# Patient Record
Sex: FEMALE | Race: ASIAN | NOT HISPANIC OR LATINO | Employment: OTHER | URBAN - METROPOLITAN AREA
[De-identification: names, ages, dates, MRNs, and addresses within clinical notes are randomized per-mention and may not be internally consistent; named-entity substitution may affect disease eponyms.]

---

## 2017-01-10 ENCOUNTER — OFFICE VISIT (OUTPATIENT)
Dept: CARDIOLOGY | Facility: CLINIC | Age: 67
End: 2017-01-10
Attending: INTERNAL MEDICINE
Payer: MEDICARE

## 2017-01-10 VITALS
WEIGHT: 138.5 LBS | BODY MASS INDEX: 23.64 KG/M2 | HEIGHT: 64 IN | HEART RATE: 70 BPM | SYSTOLIC BLOOD PRESSURE: 132 MMHG | DIASTOLIC BLOOD PRESSURE: 76 MMHG

## 2017-01-10 DIAGNOSIS — E78.00 PURE HYPERCHOLESTEROLEMIA: ICD-10-CM

## 2017-01-10 DIAGNOSIS — I25.10 CORONARY ARTERY DISEASE INVOLVING NATIVE CORONARY ARTERY OF NATIVE HEART WITHOUT ANGINA PECTORIS: ICD-10-CM

## 2017-01-10 DIAGNOSIS — I25.10 CORONARY ARTERY DISEASE INVOLVING NATIVE CORONARY ARTERY OF NATIVE HEART WITHOUT ANGINA PECTORIS: Primary | ICD-10-CM

## 2017-01-10 LAB
ALT SERPL W P-5'-P-CCNC: <5 U/L (ref 5–30)
ANION GAP SERPL CALCULATED.3IONS-SCNC: 9.1 MMOL/L (ref 6–17)
BUN SERPL-MCNC: 12 MG/DL (ref 7–30)
CALCIUM SERPL-MCNC: 8.8 MG/DL (ref 8.5–10.5)
CHLORIDE SERPL-SCNC: 106 MMOL/L (ref 98–107)
CHOLEST SERPL-MCNC: 220 MG/DL
CO2 SERPL-SCNC: 30 MMOL/L (ref 23–29)
CREAT SERPL-MCNC: 0.79 MG/DL (ref 0.7–1.3)
GFR SERPL CREATININE-BSD FRML MDRD: 73 ML/MIN/1.7M2
GLUCOSE SERPL-MCNC: 115 MG/DL (ref 70–105)
HDLC SERPL-MCNC: 61 MG/DL
LDLC SERPL CALC-MCNC: 143 MG/DL
NONHDLC SERPL-MCNC: 159 MG/DL
POTASSIUM SERPL-SCNC: 4.1 MMOL/L (ref 3.5–5.1)
SODIUM SERPL-SCNC: 141 MMOL/L (ref 136–145)
TRIGL SERPL-MCNC: 80 MG/DL

## 2017-01-10 PROCEDURE — 84460 ALANINE AMINO (ALT) (SGPT): CPT | Performed by: INTERNAL MEDICINE

## 2017-01-10 PROCEDURE — 80061 LIPID PANEL: CPT | Performed by: INTERNAL MEDICINE

## 2017-01-10 PROCEDURE — 99213 OFFICE O/P EST LOW 20 MIN: CPT | Performed by: INTERNAL MEDICINE

## 2017-01-10 PROCEDURE — 80048 BASIC METABOLIC PNL TOTAL CA: CPT | Performed by: INTERNAL MEDICINE

## 2017-01-10 PROCEDURE — 36415 COLL VENOUS BLD VENIPUNCTURE: CPT | Performed by: INTERNAL MEDICINE

## 2017-01-10 RX ORDER — NITROGLYCERIN 0.4 MG/1
0.4 TABLET SUBLINGUAL EVERY 5 MIN PRN
Qty: 25 TABLET | Refills: 0 | Status: SHIPPED | OUTPATIENT
Start: 2017-01-10 | End: 2023-06-27

## 2017-01-10 NOTE — Clinical Note
1/10/2017    Ana Smith MD  [facility]   [address] [place] [address]    RE: Ania Anna       Dear Colleague,    I had the pleasure of seeing Ms. Castillo in Cardiology Clinic today.  She has a past medical history of coronary artery disease with previous myocardial infarction.  Her initial was an infarction in the LAD distribution and she had LAD stent.  Her last angiography in 2012 for non-ST elevation MI showed a small PDA that was occluded and was managed medically.  In 2016, she had a stress nuclear study with exercise which showed a small scar in the apex.  No ischemia was noted.  EF was normal.  She remains free of chest pain.  She has no shortness of breath, orthopnea or PND.  She admits that she has not been exercising regularly.  Her total cholesterol also has increased from 166 to 220, LDL has increased to 143.  This is despite taking Zetia and simvastatin regularly.  She reassures me that the compliance with medications is good.  However, she did admit to eating a lot of eggs lately including egg yolk as well as applying butter to her toes.  This intake of saturated fats in the form of egg yolk may be contributing to worsening of cholesterol.  In the past, she was on rosuvastatin and atorvastatin.  Both were stopped because of myalgias.      PHYSICAL EXAMINATION:  Blood pressure 138/76, pulse 72 per minute and regular.  Cardiopulmonary examination unremarkable.     Outpatient Encounter Prescriptions as of 1/10/2017   Medication Sig Dispense Refill     nitroglycerin (NITROSTAT) 0.4 MG sublingual tablet Place 1 tablet (0.4 mg) under the tongue every 5 minutes as needed 25 tablet 0     trospium (SANCTURA XR) 60 MG CP24 Take 60 mg by mouth every morning       simvastatin (ZOCOR) 40 MG tablet Take 1 tablet (40 mg) by mouth At Bedtime 90 tablet 3     ezetimibe (ZETIA) 10 MG tablet Take 1 tablet (10 mg) by mouth daily 90 tablet 3     losartan (COZAAR) 25 MG tablet Take 1 tablet (25  mg) by mouth daily 90 tablet 3     metoprolol (TOPROL-XL) 25 MG 24 hr tablet Take 1 tablet (25 mg) by mouth daily 90 tablet 3     NORTRIPTYLINE HCL PO Take 40 mg by mouth At Bedtime       aspirin 81 MG EC tablet Take 81 mg by mouth daily.       omeprazole (PRILOSEC OTC) 20 MG tablet Take 20 mg by mouth daily        fish oil-omega-3 fatty acids (FISH OIL) 1000 MG capsule Take 1 g by mouth daily.       [DISCONTINUED] nitroglycerin (NITROSTAT) 0.4 MG SL tablet Place 1 tablet (0.4 mg) under the tongue every 5 minutes as needed 25 tablet 2     No facility-administered encounter medications on file as of 1/10/2017.      IMPRESSION:   1.  Coronary artery disease, stable with no angina.   2.  Hyperlipidemia.  LDL has increased, perhaps due to dietary indiscretion.  She will cut down intake of egg yolk and butter.   I also asked her to increase her exercise ability and activity.  We would suggest following with Dr. Smith for repeat lipid profile in a few months.  Continue Zetia and simvastatin at the same dose.        I will see her back in followup on an annual basis.  I will be happy to see her earlier if circumstances arise.  I encouraged her to see Dr. Smith at least once a year for routine physical and other medical issues.     Again, thank you for allowing me to participate in the care of your patient.      Sincerely,    August Mario MD     [facility]
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
## 2017-01-10 NOTE — PROGRESS NOTES
HISTORY OF PRESENT ILLNESS:  I had the pleasure of seeing Ms. Castillo in Cardiology Clinic today.  She has a past medical history of coronary artery disease with previous myocardial infarction.  Her initial was an infarction in the LAD distribution and she had LAD stent.  Her last angiography in 2012 for non-ST elevation MI showed a small PDA that was occluded and was managed medically.  In 2016, she had a stress nuclear study with exercise which showed a small scar in the apex.  No ischemia was noted.  EF was normal.  She remains free of chest pain.  She has no shortness of breath, orthopnea or PND.  She admits that she has not been exercising regularly.  Her total cholesterol also has increased from 166 to 220, LDL has increased to 143.  This is despite taking Zetia and simvastatin regularly.  She reassures me that the compliance with medications is good.  However, she did admit to eating a lot of eggs lately including egg yolk as well as applying butter to her toes.  This intake of saturated fats in the form of egg yolk may be contributing to worsening of cholesterol.  In the past, she was on rosuvastatin and atorvastatin.  Both were stopped because of myalgias.      PHYSICAL EXAMINATION:  Blood pressure 138/76, pulse 72 per minute and regular.  Cardiopulmonary examination unremarkable.      IMPRESSION:   1.  Coronary artery disease, stable with no angina.   2.  Hyperlipidemia.  LDL has increased, perhaps due to dietary indiscretion.  She will cut down intake of egg yolk and butter.   I also asked her to increase her exercise ability and activity.  We would suggest following with Dr. Smith for repeat lipid profile in a few months.  Continue Zetia and simvastatin at the same dose.        I will see her back in followup on an annual basis.  I will be happy to see her earlier if circumstances arise.  I encouraged her to see Dr. Smith at least once a year for routine physical and other medical issues.       cc:   Ana Smith MD   [facility]   [address] [place] [address]  [id number]         VIKKI GALAN MD             D: 01/10/2017 10:34   T: 01/10/2017 11:06   MT: ABELARDO      Name:     JOSE LUIS NASH   MRN:      [id number]        Account:      [id number]   :      1950           Service Date: 01/10/2017      Document: [id number]

## 2017-01-10 NOTE — MR AVS SNAPSHOT
"              After Visit Summary   1/10/2017    Ania Castillo    MRN: [id number]           Patient Information     Date Of Birth          1950        Visit Information        Provider Department      1/10/2017 9:45 AM August Mario MD [facility] [place]        Today's Diagnoses     Coronary artery disease involving native coronary artery of native heart without angina pectoris    -  1     Pure hypercholesterolemia            Follow-ups after your visit        Additional Services     Follow-Up with Cardiologist                 Future tests that were ordered for you today     Open Future Orders        Priority Expected Expires Ordered    Lipid Profile Routine 1/10/2018 2/14/2018 1/10/2017    Follow-Up with Cardiologist Routine 1/10/2018 5/25/2018 1/10/2017            Who to contact     If you have questions or need follow up information about today's clinic visit or your schedule please contact [facility] [place] directly at [phone].  Normal or non-critical lab and imaging results will be communicated to you by MyChart, letter or phone within 4 business days after the clinic has received the results. If you do not hear from us within 7 days, please contact the clinic through [facility] or phone. If you have a critical or abnormal lab result, we will notify you by phone as soon as possible.  Submit refill requests through [facility] or call your pharmacy and they will forward the refill request to us. Please allow 3 business days for your refill to be completed.          Additional Information About Your Visit        My [facility] Information     [facility] lets you send messages to your doctor, view your test results, renew your prescriptions, schedule appointments and more. To sign up, go to [email or web address] . Click on \"Log in\" on the left side of the screen, which will take you to the Welcome page. Then click on \"Sign up Now\" on the right " "side of the page.     You will be asked to enter the access code listed below, as well as some personal information. Please follow the directions to create your username and password.     Your access code is: [email or web address]  Expires: 4/10/2017 10:26 AM     Your access code will  in 90 days. If you need help or a new code, please call your [place] clinic or [phone].        Care EveryWhere ID     This is your Care EveryWhere ID. This could be used by other organizations to access your [place] medical records  [id number]        Your Vitals Were     Pulse Height BMI (Body Mass Index)             70 1.626 m (5' 4\") 23.76 kg/m2          Blood Pressure from Last 3 Encounters:   01/10/17 132/76   16 122/72   12/16/15 128/70    Weight from Last 3 Encounters:   01/10/17 62.823 kg (138 lb 8 oz)   16 63.504 kg (140 lb)   12/16/15 62.959 kg (138 lb 12.8 oz)              We Performed the Following     Follow-Up with Cardiologist          Where to get your medicines      These medications were sent to [id number] - [place], [place] - [address]  [address] [address]     Phone:  [phone]    - nitroglycerin 0.4 MG sublingual tablet       Primary Care Provider Office Phone # Fax #    Ana Smith [phone]       [facility] [address] [place] [address]        Thank you!     Thank you for choosing [facility] [place]  for your care. Our goal is always to provide you with excellent care. Hearing back from our patients is one way we can continue to improve our services. Please take a few minutes to complete the written survey that you may receive in the mail after your visit with us. Thank you!             Your Updated Medication List - Protect others around you: Learn how to safely use, store and throw away your medicines at www.disposemymeds.org.          This list is accurate as of: 1/10/17 10:26 AM.  " Always use your most recent med list.                   Brand Name Dispense Instructions for use    aspirin 81 MG EC tablet      Take 81 mg by mouth daily.       ezetimibe 10 MG tablet    ZETIA    90 tablet    Take 1 tablet (10 mg) by mouth daily       fish oil-omega-3 fatty acids 1000 MG capsule      Take 1 g by mouth daily.       losartan 25 MG tablet    COZAAR    90 tablet    Take 1 tablet (25 mg) by mouth daily       metoprolol 25 MG 24 hr tablet    TOPROL-XL    90 tablet    Take 1 tablet (25 mg) by mouth daily       nitroglycerin 0.4 MG sublingual tablet    NITROSTAT    25 tablet    Place 1 tablet (0.4 mg) under the tongue every 5 minutes as needed       NORTRIPTYLINE HCL PO      Take 40 mg by mouth At Bedtime       priLOSEC OTC 20 MG tablet   Generic drug:  omeprazole      Take 20 mg by mouth daily       simvastatin 40 MG tablet    ZOCOR    90 tablet    Take 1 tablet (40 mg) by mouth At Bedtime       trospium 60 MG Cp24 24 hr capsule    SANCTURA XR     Take 60 mg by mouth every morning

## 2017-01-20 ENCOUNTER — TELEPHONE (OUTPATIENT)
Dept: CARDIOLOGY | Facility: CLINIC | Age: 67
End: 2017-01-20

## 2017-01-20 NOTE — TELEPHONE ENCOUNTER
"Call from patient with \"concern\" over fleeting feeling she has to catch her breath that has been occuring over the last several weeks intermittently; lasting seconds. Patient states she did not discuss with Dr Mario at recent visit 1/10/2017. Patient denies shortness of breath at rest or with activity and denies chest pain. Patient requests Dr Mario be updated - messaged MD.  Maria Luisa Bhandari RN   "

## 2017-04-11 ENCOUNTER — TELEPHONE (OUTPATIENT)
Dept: CARDIOLOGY | Facility: CLINIC | Age: 67
End: 2017-04-11

## 2017-04-11 NOTE — TELEPHONE ENCOUNTER
Pt called in she is taking Biotin for hair loss 100 mg 4x a day and wants to know if she is ok to use this with her other meds. Did discuss with DR Mario and he said Biotin fine to take. Left message informing of this information. DESIREE Gaxiola RN

## 2017-09-09 ENCOUNTER — APPOINTMENT (OUTPATIENT)
Dept: GENERAL RADIOLOGY | Facility: CLINIC | Age: 67
DRG: 287 | End: 2017-09-09
Attending: EMERGENCY MEDICINE
Payer: MEDICARE

## 2017-09-09 ENCOUNTER — HOSPITAL ENCOUNTER (INPATIENT)
Facility: CLINIC | Age: 67
LOS: 2 days | Discharge: HOME | DRG: 287 | End: 2017-09-12
Attending: EMERGENCY MEDICINE | Admitting: HOSPITALIST
Payer: MEDICARE

## 2017-09-09 DIAGNOSIS — I25.10 CORONARY ARTERY DISEASE INVOLVING NATIVE CORONARY ARTERY OF NATIVE HEART WITHOUT ANGINA PECTORIS: Primary | ICD-10-CM

## 2017-09-09 DIAGNOSIS — R07.9 CHEST PAIN, UNSPECIFIED TYPE: ICD-10-CM

## 2017-09-09 DIAGNOSIS — I42.9 CARDIOMYOPATHY, UNSPECIFIED (H): ICD-10-CM

## 2017-09-09 LAB
ANION GAP SERPL CALCULATED.3IONS-SCNC: 9 MMOL/L (ref 3–14)
BASOPHILS # BLD AUTO: 0 10E9/L (ref 0–0.2)
BASOPHILS NFR BLD AUTO: 0.5 %
BUN SERPL-MCNC: 11 MG/DL (ref 7–30)
CALCIUM SERPL-MCNC: 8.9 MG/DL (ref 8.5–10.1)
CHLORIDE SERPL-SCNC: 105 MMOL/L (ref 94–109)
CO2 SERPL-SCNC: 26 MMOL/L (ref 20–32)
CREAT SERPL-MCNC: 0.74 MG/DL (ref 0.52–1.04)
DIFFERENTIAL METHOD BLD: NORMAL
EOSINOPHIL # BLD AUTO: 0.2 10E9/L (ref 0–0.7)
EOSINOPHIL NFR BLD AUTO: 2.2 %
ERYTHROCYTE [DISTWIDTH] IN BLOOD BY AUTOMATED COUNT: 12.7 % (ref 10–15)
GFR SERPL CREATININE-BSD FRML MDRD: 78 ML/MIN/1.7M2
GLUCOSE SERPL-MCNC: 121 MG/DL (ref 70–99)
HCT VFR BLD AUTO: 39.6 % (ref 35–47)
HGB BLD-MCNC: 13.1 G/DL (ref 11.7–15.7)
IMM GRANULOCYTES # BLD: 0 10E9/L (ref 0–0.4)
IMM GRANULOCYTES NFR BLD: 0.3 %
LYMPHOCYTES # BLD AUTO: 2.8 10E9/L (ref 0.8–5.3)
LYMPHOCYTES NFR BLD AUTO: 36.7 %
MAGNESIUM SERPL-MCNC: 2.2 MG/DL (ref 1.6–2.3)
MCH RBC QN AUTO: 29.2 PG (ref 26.5–33)
MCHC RBC AUTO-ENTMCNC: 33.1 G/DL (ref 31.5–36.5)
MCV RBC AUTO: 88 FL (ref 78–100)
MONOCYTES # BLD AUTO: 0.6 10E9/L (ref 0–1.3)
MONOCYTES NFR BLD AUTO: 7.7 %
NEUTROPHILS # BLD AUTO: 4 10E9/L (ref 1.6–8.3)
NEUTROPHILS NFR BLD AUTO: 52.6 %
NRBC # BLD AUTO: 0 10*3/UL
NRBC BLD AUTO-RTO: 0 /100
PLATELET # BLD AUTO: 310 10E9/L (ref 150–450)
POTASSIUM SERPL-SCNC: 4 MMOL/L (ref 3.4–5.3)
RBC # BLD AUTO: 4.49 10E12/L (ref 3.8–5.2)
SODIUM SERPL-SCNC: 140 MMOL/L (ref 133–144)
TROPONIN I SERPL-MCNC: <0.015 UG/L (ref 0–0.04)
WBC # BLD AUTO: 7.7 10E9/L (ref 4–11)

## 2017-09-09 PROCEDURE — G0378 HOSPITAL OBSERVATION PER HR: HCPCS

## 2017-09-09 PROCEDURE — A9270 NON-COVERED ITEM OR SERVICE: HCPCS | Mod: GY | Performed by: EMERGENCY MEDICINE

## 2017-09-09 PROCEDURE — 83735 ASSAY OF MAGNESIUM: CPT | Performed by: EMERGENCY MEDICINE

## 2017-09-09 PROCEDURE — 84484 ASSAY OF TROPONIN QUANT: CPT | Performed by: EMERGENCY MEDICINE

## 2017-09-09 PROCEDURE — 71020 XR CHEST 2 VW: CPT

## 2017-09-09 PROCEDURE — 25000132 ZZH RX MED GY IP 250 OP 250 PS 637: Mod: GY | Performed by: PHYSICIAN ASSISTANT

## 2017-09-09 PROCEDURE — 93005 ELECTROCARDIOGRAM TRACING: CPT

## 2017-09-09 PROCEDURE — 25000132 ZZH RX MED GY IP 250 OP 250 PS 637: Mod: GY | Performed by: EMERGENCY MEDICINE

## 2017-09-09 PROCEDURE — 85025 COMPLETE CBC W/AUTO DIFF WBC: CPT | Performed by: EMERGENCY MEDICINE

## 2017-09-09 PROCEDURE — 99285 EMERGENCY DEPT VISIT HI MDM: CPT | Mod: 25

## 2017-09-09 PROCEDURE — 99207 ZZC CDG-MDM COMPONENT: MEETS LOW - DOWN CODED: CPT | Performed by: PHYSICIAN ASSISTANT

## 2017-09-09 PROCEDURE — 99219 ZZC INITIAL OBSERVATION CARE,LEVL II: CPT | Performed by: PHYSICIAN ASSISTANT

## 2017-09-09 PROCEDURE — 80048 BASIC METABOLIC PNL TOTAL CA: CPT | Performed by: EMERGENCY MEDICINE

## 2017-09-09 PROCEDURE — 36415 COLL VENOUS BLD VENIPUNCTURE: CPT | Performed by: PHYSICIAN ASSISTANT

## 2017-09-09 PROCEDURE — A9270 NON-COVERED ITEM OR SERVICE: HCPCS | Mod: GY | Performed by: PHYSICIAN ASSISTANT

## 2017-09-09 PROCEDURE — 84484 ASSAY OF TROPONIN QUANT: CPT | Performed by: PHYSICIAN ASSISTANT

## 2017-09-09 RX ORDER — NALOXONE HYDROCHLORIDE 0.4 MG/ML
.1-.4 INJECTION, SOLUTION INTRAMUSCULAR; INTRAVENOUS; SUBCUTANEOUS
Status: DISCONTINUED | OUTPATIENT
Start: 2017-09-09 | End: 2017-09-12 | Stop reason: HOSPADM

## 2017-09-09 RX ORDER — NORTRIPTYLINE HCL 10 MG
40 CAPSULE ORAL AT BEDTIME
Status: DISCONTINUED | OUTPATIENT
Start: 2017-09-09 | End: 2017-09-12 | Stop reason: HOSPADM

## 2017-09-09 RX ORDER — CYCLOBENZAPRINE HCL 5 MG
5-10 TABLET ORAL 3 TIMES DAILY PRN
Status: DISCONTINUED | OUTPATIENT
Start: 2017-09-09 | End: 2017-09-12 | Stop reason: HOSPADM

## 2017-09-09 RX ORDER — NITROGLYCERIN 0.4 MG/1
0.4 TABLET SUBLINGUAL EVERY 5 MIN PRN
Status: DISCONTINUED | OUTPATIENT
Start: 2017-09-09 | End: 2017-09-11

## 2017-09-09 RX ORDER — LOSARTAN POTASSIUM 25 MG/1
25 TABLET ORAL DAILY
Status: DISCONTINUED | OUTPATIENT
Start: 2017-09-09 | End: 2017-09-12 | Stop reason: HOSPADM

## 2017-09-09 RX ORDER — ALUMINA, MAGNESIA, AND SIMETHICONE 2400; 2400; 240 MG/30ML; MG/30ML; MG/30ML
15-30 SUSPENSION ORAL EVERY 4 HOURS PRN
Status: DISCONTINUED | OUTPATIENT
Start: 2017-09-09 | End: 2017-09-12 | Stop reason: HOSPADM

## 2017-09-09 RX ORDER — ASPIRIN 81 MG/1
81 TABLET ORAL DAILY
Status: DISCONTINUED | OUTPATIENT
Start: 2017-09-10 | End: 2017-09-09

## 2017-09-09 RX ORDER — ASPIRIN 81 MG/1
162 TABLET, CHEWABLE ORAL ONCE
Status: DISCONTINUED | OUTPATIENT
Start: 2017-09-09 | End: 2017-09-09

## 2017-09-09 RX ORDER — NITROGLYCERIN 0.4 MG/1
0.4 TABLET SUBLINGUAL EVERY 5 MIN PRN
Status: DISCONTINUED | OUTPATIENT
Start: 2017-09-09 | End: 2017-09-09

## 2017-09-09 RX ORDER — ACETAMINOPHEN 325 MG/1
650 TABLET ORAL EVERY 4 HOURS PRN
Status: DISCONTINUED | OUTPATIENT
Start: 2017-09-09 | End: 2017-09-09

## 2017-09-09 RX ORDER — METOPROLOL SUCCINATE 25 MG/1
25 TABLET, EXTENDED RELEASE ORAL DAILY
Status: DISCONTINUED | OUTPATIENT
Start: 2017-09-09 | End: 2017-09-09

## 2017-09-09 RX ORDER — MORPHINE SULFATE 2 MG/ML
2-4 INJECTION, SOLUTION INTRAMUSCULAR; INTRAVENOUS
Status: DISCONTINUED | OUTPATIENT
Start: 2017-09-09 | End: 2017-09-12 | Stop reason: HOSPADM

## 2017-09-09 RX ORDER — LIDOCAINE 40 MG/G
CREAM TOPICAL
Status: DISCONTINUED | OUTPATIENT
Start: 2017-09-09 | End: 2017-09-12 | Stop reason: HOSPADM

## 2017-09-09 RX ORDER — ASPIRIN 81 MG/1
81 TABLET ORAL DAILY
Status: DISCONTINUED | OUTPATIENT
Start: 2017-09-10 | End: 2017-09-10 | Stop reason: DRUGHIGH

## 2017-09-09 RX ORDER — BIOTIN 1 MG
1 TABLET ORAL DAILY
COMMUNITY

## 2017-09-09 RX ORDER — SIMVASTATIN 40 MG
40 TABLET ORAL AT BEDTIME
Status: DISCONTINUED | OUTPATIENT
Start: 2017-09-09 | End: 2017-09-11

## 2017-09-09 RX ORDER — ACETAMINOPHEN 650 MG/1
650 SUPPOSITORY RECTAL EVERY 4 HOURS PRN
Status: DISCONTINUED | OUTPATIENT
Start: 2017-09-09 | End: 2017-09-12 | Stop reason: HOSPADM

## 2017-09-09 RX ORDER — ACETAMINOPHEN 325 MG/1
975 TABLET ORAL EVERY 6 HOURS PRN
Status: DISCONTINUED | OUTPATIENT
Start: 2017-09-09 | End: 2017-09-12

## 2017-09-09 RX ORDER — HYDROXYZINE HYDROCHLORIDE 25 MG/1
50 TABLET, FILM COATED ORAL EVERY 6 HOURS PRN
Status: DISCONTINUED | OUTPATIENT
Start: 2017-09-09 | End: 2017-09-12 | Stop reason: HOSPADM

## 2017-09-09 RX ORDER — EZETIMIBE 10 MG/1
10 TABLET ORAL EVERY EVENING
Status: DISCONTINUED | OUTPATIENT
Start: 2017-09-09 | End: 2017-09-12 | Stop reason: HOSPADM

## 2017-09-09 RX ORDER — HYDROXYZINE HYDROCHLORIDE 25 MG/1
25 TABLET, FILM COATED ORAL EVERY 6 HOURS PRN
Status: DISCONTINUED | OUTPATIENT
Start: 2017-09-09 | End: 2017-09-12 | Stop reason: HOSPADM

## 2017-09-09 RX ORDER — ACETAMINOPHEN 500 MG
1000 TABLET ORAL EVERY 6 HOURS PRN
COMMUNITY

## 2017-09-09 RX ORDER — ACETAMINOPHEN 325 MG/1
650 TABLET ORAL ONCE
Status: COMPLETED | OUTPATIENT
Start: 2017-09-09 | End: 2017-09-09

## 2017-09-09 RX ORDER — ASPIRIN 81 MG/1
324 TABLET, CHEWABLE ORAL ONCE
Status: COMPLETED | OUTPATIENT
Start: 2017-09-09 | End: 2017-09-09

## 2017-09-09 RX ADMIN — SIMVASTATIN 40 MG: 40 TABLET, FILM COATED ORAL at 21:00

## 2017-09-09 RX ADMIN — ACETAMINOPHEN 650 MG: 325 TABLET ORAL at 13:15

## 2017-09-09 RX ADMIN — LOSARTAN POTASSIUM 25 MG: 25 TABLET ORAL at 16:43

## 2017-09-09 RX ADMIN — OMEPRAZOLE 20 MG: 20 CAPSULE, DELAYED RELEASE ORAL at 16:43

## 2017-09-09 RX ADMIN — CYCLOBENZAPRINE HYDROCHLORIDE 5 MG: 5 TABLET, FILM COATED ORAL at 16:43

## 2017-09-09 RX ADMIN — ASPIRIN 81 MG 324 MG: 81 TABLET ORAL at 12:47

## 2017-09-09 RX ADMIN — EZETIMIBE 10 MG: 10 TABLET ORAL at 21:00

## 2017-09-09 RX ADMIN — NORTRIPTYLINE HYDROCHLORIDE 40 MG: 10 CAPSULE ORAL at 21:00

## 2017-09-09 ASSESSMENT — ENCOUNTER SYMPTOMS
SHORTNESS OF BREATH: 0
NAUSEA: 0
FEVER: 0
LIGHT-HEADEDNESS: 0
HEADACHES: 1
VOMITING: 0
FATIGUE: 1
DIARRHEA: 0

## 2017-09-09 ASSESSMENT — PAIN DESCRIPTION - DESCRIPTORS: DESCRIPTORS: HEADACHE
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
## 2017-09-09 NOTE — ED NOTES
"[facility]  ED Nurse Handoff Report    Ania Castillo is a 67 year old female   ED Chief complaint: Chest Pain  . ED Diagnosis:   Final diagnoses:   Chest pain, unspecified type     Allergies:   Allergies   Allergen Reactions     Atorvastatin      myalgias     Rosuvastatin      myalgias       Code Status: Full Code  Activity level - Baseline/Home:  Independent. Activity Level - Current:   Independent. Lift room needed: No. Bariatric: No   Needed: No   Isolation: No. Infection: Not Applicable.     Vital Signs:   Vitals:    09/09/17 1239 09/09/17 1315 09/09/17 1330   BP: 133/80 108/78 113/72   Pulse: 78     Temp: 97.9  F (36.6  C)     TempSrc: Oral     SpO2: 99% 96% 96%   Weight: 59.9 kg (132 lb)     Height: 1.626 m (5' 4\")         Cardiac Rhythm:  ,      Pain level: 0-10 Pain Scale: 5 (\"heavy on head and shoulders.\")  Patient confused: No. Patient Falls Risk: No.   Elimination Status: Has voided   Patient Report - Initial Complaint: \"heavy head and shoulders.\". Focused Assessment: A&O. C/O heaviness on shoulders and in head and general \"not feeling right.\" Discomfort 5/10. Tylenol given with good results.    Tests Performed:   Labs Ordered and Resulted from Time of ED Arrival Up to the Time of Departure from the ED   BASIC METABOLIC PANEL - Abnormal; Notable for the following:        Result Value    Glucose 121 (*)     All other components within normal limits   CBC WITH PLATELETS DIFFERENTIAL   TROPONIN I   MAGNESIUM   PULSE OXIMETRY NURSING   CARDIAC CONTINUOUS MONITORING   PERIPHERAL IV CATHETER   CXR- Negative  . Abnormal Results: .   Treatments provided: Asa 324 mg. tylenol  Family Comments: at bedside  OBS brochure/video discussed/provided to patient:  Yes  ED Medications:   Medications   nitroGLYcerin (NITROSTAT) sublingual tablet 0.4 mg (not administered)   aspirin chewable tablet 324 mg (324 mg Oral Given 9/9/17 1247)   acetaminophen (TYLENOL) tablet 650 mg (650 mg Oral Given 9/9/17 " 1315)     Drips infusing:  No  For the majority of the shift this patient was Green. Interventions performed were NA.     Severe Sepsis OR Septic Shock Diagnosis Present: No      ED Nurse Name/Phone Number: Anastasia Rothmanlas,   1:49 PM    RECEIVING UNIT ED HANDOFF REVIEW    Above ED Nurse Handoff Report was reviewed: yes  Reviewed by: Alexia Hernandez on September 9, 2017 at 2:14 PM

## 2017-09-09 NOTE — PHARMACY-ADMISSION MEDICATION HISTORY
Admission medication history interview status for this patient is complete. See [facility] admission navigator for allergy information, prior to admission medications and immunization status.     Medication history interview source(s):Patient  Medication history resources (including written lists, pill bottles, clinic record):clinic record  Primary pharmacy:[facility] in [place]    Changes made to PTA medication list:  Added: tylenol, biotin  Deleted: trospium  Changed: none    Actions taken by pharmacist (provider contacted, etc):None     Additional medication history information:Unknown strength of biotin that she takes for hair growth.    Medication reconciliation/reorder completed by provider prior to medication history? No    Do you take OTC medications (eg tylenol, ibuprofen, fish oil, eye/ear drops, etc)? Y    Prior to Admission medications    Medication Sig Last Dose Taking? Auth Provider   acetaminophen (TYLENOL) 500 MG tablet Take 1,000 mg by mouth every 6 hours as needed 9/8/2017 at Unknown time Yes Unknown, Entered By History   BIOTIN PO Take 4 tablets by mouth daily 9/8/2017 at Unknown time Yes Unknown, Entered By History   nitroglycerin (NITROSTAT) 0.4 MG sublingual tablet Place 1 tablet (0.4 mg) under the tongue every 5 minutes as needed 9/9/2017 at Unknown time Yes August Mario MD   simvastatin (ZOCOR) 40 MG tablet Take 1 tablet (40 mg) by mouth At Bedtime 9/8/2017 at Unknown time Yes August Mario MD   ezetimibe (ZETIA) 10 MG tablet Take 1 tablet (10 mg) by mouth daily 9/8/2017 at Unknown time Yes August Mario MD   losartan (COZAAR) 25 MG tablet Take 1 tablet (25 mg) by mouth daily 9/8/2017 at Unknown time Yes August Mario MD   metoprolol (TOPROL-XL) 25 MG 24 hr tablet Take 1 tablet (25 mg) by mouth daily 9/8/2017 at Unknown time Yes August Mario MD   NORTRIPTYLINE HCL PO Take 40 mg by mouth At Bedtime 9/8/2017 at Unknown time Yes Unknown, Entered By History    aspirin 81 MG EC tablet Take 81 mg by mouth daily. 9/8/2017 at Unknown time Yes Unknown, Entered By History   omeprazole (PRILOSEC OTC) 20 MG tablet Take 20 mg by mouth daily  9/8/2017 at Unknown time Yes Unknown, Entered By History   fish oil-omega-3 fatty acids (FISH OIL) 1000 MG capsule Take 1 g by mouth daily. 9/8/2017 at Unknown time Yes Unknown, Entered By History

## 2017-09-09 NOTE — IP AVS SNAPSHOT
MRN:[id number]                      After Visit Summary   9/9/2017    Ania Castillo    MRN: [id number]           Thank you!     Thank you for choosing [facility] for your care. Our goal is always to provide you with excellent care. Hearing back from our patients is one way we can continue to improve our services. Please take a few minutes to complete the written survey that you may receive in the mail after you visit. If you would like to speak to someone directly about your visit please contact Patient Relations at [phone]. Thank you!          Patient Information     Date Of Birth          1950        Designated Caregiver       Most Recent Value    Caregiver    Will someone help with your care after discharge? yes    Name of designated caregiver son-ali    Phone number of caregiver [phone]    Caregiver address [place]      About your hospital stay     You were admitted on:  September 9, 2017 You last received care in the:  [place] [address] [facility]    You were discharged on:  September 12, 2017        Reason for your hospital stay       CAD                  Who to Call     For medical emergencies, please call 911.  For non-urgent questions about your medical care, please call your primary care provider or clinic, [phone]          Attending Provider     Provider Specialty    Seaed Mahajan DO Emergency Medicine    Holden Funk MD Internal Medicine       Primary Care Provider Office Phone # Fax #    Ana Smith [phone]       When to contact your care team       Call your primary doctor if you have any of the following:  increased shortness of breath or increased pain.                  After Care Instructions     Activity       Your activity upon discharge: activity as tolerated            Diet       Follow this diet upon discharge: Orders Placed This Encounter      Low Saturated Fat Na <2400 mg            May discharge  "when       OK WITH CARDIOLOGY                  Follow-up Appointments     Follow-up and recommended labs and tests        Follow up with primary care provider, Ana Smith, within 7 days for hospital follow- up.  No follow up labs or test are needed.                  Additional Services     Follow-Up with Cardiologist                 Future tests that were ordered for you     MRI Angiogram chest w & w/o contrast       Administration of IV contrast (contrast agent, dose, and amount) will be tailored to this examination per the appropriate written protocol listed in the Protocol E-Book, or by the supervising imaging provider.                  Pending Results     No orders found from 9/7/2017 to 9/10/2017.            Statement of Approval     Ordered          09/12/17 1430  I have reviewed and agree with all the recommendations and orders detailed in this document.  EFFECTIVE NOW     Approved and electronically signed by:  Carlin Pena MD             Admission Information     Date & Time Provider Department Dept. Phone    9/9/2017 Holden Funk MD [place] [address] [facility] [phone]      Your Vitals Were     Blood Pressure Pulse Temperature Respirations Height Weight    117/74 (BP Location: Left leg) 78 97.9  F (36.6  C) (Oral) 16 1.626 m (5' 4\") 61.4 kg (135 lb 6.4 oz)    Pulse Oximetry BMI (Body Mass Index)                97% 23.24 kg/m2          [employer] Information     [employer] lets you send messages to your doctor, view your test results, renew your prescriptions, schedule appointments and more. To sign up, go to [email or web address] . Click on \"Log in\" on the left side of the screen, which will take you to the Welcome page. Then click on \"Sign up Now\" on the right side of the page.     You will be asked to enter the access code listed below, as well as some personal information. Please follow the directions to create your username and password.     Your access code is: " [email or web address]  Expires: 12/10/2017  7:16 PM     Your access code will  in 90 days. If you need help or a new code, please call your [place] clinic or [phone].        Care EveryWhere ID     This is your Care EveryWhere ID. This could be used by other organizations to access your [place] medical records  [id number]        Equal Access to Services     THANH ADAN : Hadii aad ku hadasho Soomaali, waaxda luqadaha, qaybta kaalmada adeegyada, waxay rishabhin hayluisn aderebel ojeda laIlenerebecca . So [facility] [phone].    ATENCIÓN: Si habla espjadon, tiene a pedraza disposición servicios gratuitos de asistencia lingüística. Llame al [phone].    We comply with applicable federal civil rights laws and [place] laws. We do not discriminate on the basis of race, color, national origin, age, disability sex, sexual orientation or gender identity.               Review of your medicines      CONTINUE these medicines which have NOT CHANGED        Dose / Directions    acetaminophen 500 MG tablet   Commonly known as:  TYLENOL        Dose:  1000 mg   Take 1,000 mg by mouth every 6 hours as needed   Refills:  0       aspirin 81 MG EC tablet        Dose:  81 mg   Take 81 mg by mouth daily.   Refills:  0       BIOTIN PO        Dose:  4 tablet   Take 4 tablets by mouth daily   Refills:  0       ezetimibe 10 MG tablet   Commonly known as:  ZETIA   Used for:  Coronary artery disease involving native coronary artery of native heart without angina pectoris        Dose:  10 mg   Take 1 tablet (10 mg) by mouth daily   Quantity:  90 tablet   Refills:  3       fish oil-omega-3 fatty acids 1000 MG capsule        Dose:  1 g   Take 1 g by mouth daily.   Refills:  0       losartan 25 MG tablet   Commonly known as:  COZAAR   Used for:  Coronary artery disease involving native coronary artery of native heart without angina pectoris        Dose:  25 mg   Take 1 tablet (25 mg) by mouth daily   Quantity:  90 tablet   Refills:  3       metoprolol 25 MG 24  hr tablet   Commonly known as:  TOPROL-XL   Used for:  Coronary artery disease involving native coronary artery of native heart without angina pectoris        Dose:  25 mg   Take 1 tablet (25 mg) by mouth daily   Quantity:  90 tablet   Refills:  3       nitroGLYcerin 0.4 MG sublingual tablet   Commonly known as:  NITROSTAT   Used for:  Coronary artery disease involving native coronary artery of native heart without angina pectoris        Dose:  0.4 mg   Place 1 tablet (0.4 mg) under the tongue every 5 minutes as needed   Quantity:  25 tablet   Refills:  0       NORTRIPTYLINE HCL PO        Dose:  40 mg   Take 40 mg by mouth At Bedtime   Refills:  0       priLOSEC OTC 20 MG tablet   Generic drug:  omeprazole        Dose:  20 mg   Take 20 mg by mouth daily   Refills:  0       simvastatin 40 MG tablet   Commonly known as:  ZOCOR   Used for:  Coronary artery disease involving native coronary artery of native heart without angina pectoris        Dose:  40 mg   Take 1 tablet (40 mg) by mouth At Bedtime   Quantity:  90 tablet   Refills:  3                Protect others around you: Learn how to safely use, store and throw away your medicines at www.disposemymeds.org.             Medication List: This is a list of all your medications and when to take them. Check marks below indicate your daily home schedule. Keep this list as a reference.      Medications           Morning Afternoon Evening Bedtime As Needed    acetaminophen 500 MG tablet   Commonly known as:  TYLENOL   Take 1,000 mg by mouth every 6 hours as needed   Last time this was given:  975 mg on 9/11/2017 11:00 PM                                   aspirin 81 MG EC tablet   Take 81 mg by mouth daily.   Last time this was given:  81 mg on 9/12/2017  9:09 AM   Next Dose Due:  9-13-17                                   BIOTIN PO   Take 4 tablets by mouth daily   Next Dose Due:  9-13-17                                   ezetimibe 10 MG tablet   Commonly known as:  ZETIA    Take 1 tablet (10 mg) by mouth daily   Last time this was given:  10 mg on 9/11/2017  7:22 PM   Next Dose Due:  9-12-17                                   fish oil-omega-3 fatty acids 1000 MG capsule   Take 1 g by mouth daily.   Next Dose Due:  9-13-17                                   losartan 25 MG tablet   Commonly known as:  COZAAR   Take 1 tablet (25 mg) by mouth daily   Last time this was given:  25 mg on 9/12/2017  9:09 AM   Next Dose Due:  9-13-17                                   metoprolol 25 MG 24 hr tablet   Commonly known as:  TOPROL-XL   Take 1 tablet (25 mg) by mouth daily   Last time this was given:  25 mg on 9/12/2017  9:08 AM   Next Dose Due:  9-13-17                                   nitroGLYcerin 0.4 MG sublingual tablet   Commonly known as:  NITROSTAT   Place 1 tablet (0.4 mg) under the tongue every 5 minutes as needed                                   NORTRIPTYLINE HCL PO   Take 40 mg by mouth At Bedtime   Last time this was given:  40 mg on 9/11/2017 10:42 PM   Next Dose Due:  9-12-17                                   priLOSEC OTC 20 MG tablet   Take 20 mg by mouth daily   Generic drug:  omeprazole   Next Dose Due:  9-13-17                                   simvastatin 40 MG tablet   Commonly known as:  ZOCOR   Take 1 tablet (40 mg) by mouth At Bedtime   Last time this was given:  40 mg on 9/10/2017  9:01 PM   Next Dose Due:  9-12-17                                             More Information        What Is a Hiatal Hernia?    Hiatal hernia is when the area where the stomach and esophagus meet bulges up through the diaphragm into the chest cavity. In some cases, part of the stomach may bulge above the diaphragm. Stomach acid may move up into the esophagus and cause symptoms. The symptoms are often blamed on gastroesophageal reflux disease (GERD). You may only know about the hernia when it shows up on an X-ray taken for other reasons.   What you may feel  The hiatus is a normal hole in the  diaphragm. The esophagus passes through this hole and leads to the stomach. In some cases, part of the stomach may bulge above the diaphragm. This bulge is called a hernia. Stomach acid may move up into the esophagus and cause symptoms.  When you eat, the muscle at the hiatus relaxes to allow food to pass into the stomach. It tightens again to keep food and digestive acids in the stomach.  Many people with hiatal hernias have mild symptoms. You may notice the following GERD symptoms:    Heartburn or other chest discomfort    A feeling of chest fullness after a meal    Frequent burping    Acid taste in the mouth    Trouble swallowing  Treating symptoms  If you have been diagnosed with hiatal hernia, these suggestions may help improve symptoms:    Lose excess weight. Extra weight puts pressure on the stomach and esophagus.    Don t lie down after eating. Sit up for at least an hour after eating. Lying down after eating can increase symptoms.    Avoid certain foods and drinks. These include fatty foods, chocolate, coffee, mint, and other foods that cause symptoms for you.    Don t smoke or drink alcohol. These can worsen symptoms.    Look at your medicines. Discuss your medicines with your healthcare provider. Many medicines can cause symptoms.    Consider an antacid medicine. Ask your healthcare provider about over-the-counter and prescription medicines that may help.    Ask about surgery, if needed. Surgery is a treatment choice for some people. Your healthcare provider can determine if surgery is an option for you.    Date Last Reviewed: 10/1/2016    1690-7534 [employer]. [address] [place] [address], [place] [address]. All rights reserved. This information is not intended as a substitute for professional medical care. Always follow your healthcare professional's instructions.

## 2017-09-09 NOTE — IP AVS SNAPSHOT
[place] [address] [facility]    [address]    [facility] [address]    Phone:  [phone]    Fax:  [phone]                                       After Visit Summary   9/9/2017    Ania Castillo    MRN: [id number]           After Visit Summary Signature Page     I have received my discharge instructions, and my questions have been answered. I have discussed any challenges I see with this plan with the nurse or doctor.    ..........................................................................................................................................  Patient/Patient Representative Signature      ..........................................................................................................................................  Patient Representative Print Name and Relationship to Patient    ..................................................               ................................................  Date                                            Time    ..........................................................................................................................................  Reviewed by Signature/Title    ...................................................              ..............................................  Date                                                            Time

## 2017-09-09 NOTE — PROGRESS NOTES
ROOM # 208-2    Living Situation (if not independent, order SW consult): Lives ind with son  Facility name:  : Daughter or Son, Sudha see demographics    Activity level at baseline: ind  Activity level on admit: ind      Patient registered to observation; given Patient Bill of Rights; given the opportunity to ask questions about observation status and their plan of care.  Patient has been oriented to the observation room, bathroom and call light is in place.    Discussed discharge goals and expectations with patient/family.

## 2017-09-09 NOTE — ED NOTES
"Pt presents to ED with c/o \"not feeling right.\" Feels like her head and shoulders are \"heavy.\" \"I just know something is not right.\" Pt reports she has taken 2 nitros a day for the past 5 days. Tired for 10 days. Reports not having any pain. Hx of MI, but this does not feel like previous MI's. Denies SOB. Had some Slight nausea today, no vomiting.   "

## 2017-09-09 NOTE — PLAN OF CARE
Problem: Discharge Planning  Goal: Discharge Planning (Adult, OB, Behavioral, Peds)  PRIMARY DIAGNOSIS: CHEST PAIN  OUTPATIENT/OBSERVATION GOALS TO BE MET BEFORE DISCHARGE:  1. Ruled out acute coronary syndrome (negative or stable Troponin): Yes, trops neg x 2  2. Pain Status: Improved-controlled with oral pain medications.  3. Appropriate provocative testing performed: No  - Stress Test Procedure: ex stress test  - Interpretation of cardiac rhythm per telemetry tech: SR HR 77     4. Cleared by Consultants (if applicable): N/A  5. Return to near baseline physical activity: Yes  Pt A&Ox4, VSS. Pt c/o mild headache and neck heaviness, PRN 5 mg flexeril given. Pt denies any SOB, chest pain, or nausea at this time. Tolerating regular diet. Per pt request, lipid panel lab draw in AM. Trops neg x 2. Ex stress in AM. Will continue to monitor.  Discharge Planner Nurse   Safe discharge environment identified: Yes  Barriers to discharge: Yes       Entered by: Lolita Fortune 09/09/2017 6:22 PM     Please review provider order for any additional goals.   Nurse to notify provider when observation goals have been met and patient is ready for discharge.
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
## 2017-09-10 ENCOUNTER — APPOINTMENT (OUTPATIENT)
Dept: CARDIOLOGY | Facility: CLINIC | Age: 67
DRG: 287 | End: 2017-09-10
Attending: PHYSICIAN ASSISTANT
Payer: MEDICARE

## 2017-09-10 PROBLEM — I20.0 UNSTABLE ANGINA (H): Status: ACTIVE | Noted: 2017-09-10

## 2017-09-10 LAB
CHOLEST SERPL-MCNC: 197 MG/DL
HDLC SERPL-MCNC: 59 MG/DL
INTERPRETATION ECG - MUSE: NORMAL
LDLC SERPL CALC-MCNC: 110 MG/DL
LMWH PPP CHRO-ACNC: 0.7 IU/ML
LMWH PPP CHRO-ACNC: 0.91 IU/ML
NONHDLC SERPL-MCNC: 138 MG/DL
TRIGL SERPL-MCNC: 142 MG/DL

## 2017-09-10 PROCEDURE — 25000132 ZZH RX MED GY IP 250 OP 250 PS 637: Mod: GY | Performed by: HOSPITALIST

## 2017-09-10 PROCEDURE — 99233 SBSQ HOSP IP/OBS HIGH 50: CPT | Performed by: PHYSICIAN ASSISTANT

## 2017-09-10 PROCEDURE — 25000132 ZZH RX MED GY IP 250 OP 250 PS 637: Mod: GY | Performed by: PHYSICIAN ASSISTANT

## 2017-09-10 PROCEDURE — G0378 HOSPITAL OBSERVATION PER HR: HCPCS

## 2017-09-10 PROCEDURE — 80061 LIPID PANEL: CPT | Performed by: PHYSICIAN ASSISTANT

## 2017-09-10 PROCEDURE — 93005 ELECTROCARDIOGRAM TRACING: CPT

## 2017-09-10 PROCEDURE — 40000275 ZZH STATISTIC RCP TIME EA 10 MIN

## 2017-09-10 PROCEDURE — 93010 ELECTROCARDIOGRAM REPORT: CPT | Performed by: INTERNAL MEDICINE

## 2017-09-10 PROCEDURE — 12000007 ZZH R&B INTERMEDIATE

## 2017-09-10 PROCEDURE — 36415 COLL VENOUS BLD VENIPUNCTURE: CPT | Performed by: HOSPITALIST

## 2017-09-10 PROCEDURE — 93018 CV STRESS TEST I&R ONLY: CPT | Performed by: INTERNAL MEDICINE

## 2017-09-10 PROCEDURE — A9270 NON-COVERED ITEM OR SERVICE: HCPCS | Mod: GY | Performed by: PHYSICIAN ASSISTANT

## 2017-09-10 PROCEDURE — 93325 DOPPLER ECHO COLOR FLOW MAPG: CPT | Mod: 26 | Performed by: INTERNAL MEDICINE

## 2017-09-10 PROCEDURE — 93321 DOPPLER ECHO F-UP/LMTD STD: CPT | Mod: 26 | Performed by: INTERNAL MEDICINE

## 2017-09-10 PROCEDURE — 99222 1ST HOSP IP/OBS MODERATE 55: CPT | Mod: 25 | Performed by: INTERNAL MEDICINE

## 2017-09-10 PROCEDURE — 25500064 ZZH RX 255 OP 636: Performed by: HOSPITALIST

## 2017-09-10 PROCEDURE — 93350 STRESS TTE ONLY: CPT | Mod: 26 | Performed by: INTERNAL MEDICINE

## 2017-09-10 PROCEDURE — A9270 NON-COVERED ITEM OR SERVICE: HCPCS | Mod: GY | Performed by: HOSPITALIST

## 2017-09-10 PROCEDURE — 36415 COLL VENOUS BLD VENIPUNCTURE: CPT | Performed by: PHYSICIAN ASSISTANT

## 2017-09-10 PROCEDURE — 40000264 ECHO STRESS WITH OPTISON

## 2017-09-10 PROCEDURE — 25000128 H RX IP 250 OP 636: Performed by: HOSPITALIST

## 2017-09-10 PROCEDURE — 85520 HEPARIN ASSAY: CPT | Performed by: HOSPITALIST

## 2017-09-10 PROCEDURE — 93016 CV STRESS TEST SUPVJ ONLY: CPT | Performed by: INTERNAL MEDICINE

## 2017-09-10 RX ORDER — CLINDAMYCIN PHOSPHATE 900 MG/50ML
900 INJECTION, SOLUTION INTRAVENOUS EVERY 8 HOURS
Status: DISCONTINUED | OUTPATIENT
Start: 2017-09-10 | End: 2017-09-10

## 2017-09-10 RX ORDER — SODIUM CHLORIDE 9 MG/ML
INJECTION, SOLUTION INTRAVENOUS CONTINUOUS
Status: DISCONTINUED | OUTPATIENT
Start: 2017-09-10 | End: 2017-09-11 | Stop reason: HOSPADM

## 2017-09-10 RX ORDER — POTASSIUM CHLORIDE 1500 MG/1
20 TABLET, EXTENDED RELEASE ORAL
Status: DISCONTINUED | OUTPATIENT
Start: 2017-09-10 | End: 2017-09-11 | Stop reason: HOSPADM

## 2017-09-10 RX ORDER — METOPROLOL SUCCINATE 25 MG/1
25 TABLET, EXTENDED RELEASE ORAL DAILY
Status: DISCONTINUED | OUTPATIENT
Start: 2017-09-10 | End: 2017-09-12 | Stop reason: HOSPADM

## 2017-09-10 RX ORDER — LORAZEPAM 2 MG/ML
0.5 INJECTION INTRAMUSCULAR
Status: DISCONTINUED | OUTPATIENT
Start: 2017-09-10 | End: 2017-09-11 | Stop reason: HOSPADM

## 2017-09-10 RX ORDER — LORAZEPAM 0.5 MG/1
0.5 TABLET ORAL
Status: DISCONTINUED | OUTPATIENT
Start: 2017-09-10 | End: 2017-09-11 | Stop reason: HOSPADM

## 2017-09-10 RX ORDER — ASPIRIN 81 MG/1
243 TABLET ORAL ONCE
Status: COMPLETED | OUTPATIENT
Start: 2017-09-10 | End: 2017-09-10

## 2017-09-10 RX ORDER — LIDOCAINE 40 MG/G
CREAM TOPICAL
Status: DISCONTINUED | OUTPATIENT
Start: 2017-09-10 | End: 2017-09-11 | Stop reason: HOSPADM

## 2017-09-10 RX ADMIN — LOSARTAN POTASSIUM 25 MG: 25 TABLET ORAL at 07:44

## 2017-09-10 RX ADMIN — Medication 3400 UNITS: at 14:30

## 2017-09-10 RX ADMIN — ASPIRIN 243 MG: 81 TABLET, COATED ORAL at 15:15

## 2017-09-10 RX ADMIN — HEPARIN SODIUM 700 UNITS/HR: 10000 INJECTION, SOLUTION INTRAVENOUS at 14:30

## 2017-09-10 RX ADMIN — ASPIRIN 81 MG: 81 TABLET, COATED ORAL at 07:44

## 2017-09-10 RX ADMIN — CYCLOBENZAPRINE HYDROCHLORIDE 5 MG: 5 TABLET, FILM COATED ORAL at 15:28

## 2017-09-10 RX ADMIN — EZETIMIBE 10 MG: 10 TABLET ORAL at 21:01

## 2017-09-10 RX ADMIN — NORTRIPTYLINE HYDROCHLORIDE 40 MG: 10 CAPSULE ORAL at 21:01

## 2017-09-10 RX ADMIN — HUMAN ALBUMIN MICROSPHERES AND PERFLUTREN 4 ML: 10; .22 INJECTION, SOLUTION INTRAVENOUS at 09:02

## 2017-09-10 RX ADMIN — METOPROLOL SUCCINATE 25 MG: 25 TABLET, EXTENDED RELEASE ORAL at 17:00

## 2017-09-10 RX ADMIN — SIMVASTATIN 40 MG: 40 TABLET, FILM COATED ORAL at 21:01

## 2017-09-10 RX ADMIN — OMEPRAZOLE 20 MG: 20 CAPSULE, DELAYED RELEASE ORAL at 07:44

## 2017-09-10 ASSESSMENT — ACTIVITIES OF DAILY LIVING (ADL)
RETIRED_EATING: 0-->INDEPENDENT
TOILETING: 0-->INDEPENDENT
SWALLOWING: 0-->SWALLOWS FOODS/LIQUIDS WITHOUT DIFFICULTY
RETIRED_COMMUNICATION: 0-->UNDERSTANDS/COMMUNICATES WITHOUT DIFFICULTY
TRANSFERRING: 0-->INDEPENDENT
AMBULATION: 0-->INDEPENDENT
DRESS: 0-->INDEPENDENT
BATHING: 0-->INDEPENDENT
FALL_HISTORY_WITHIN_LAST_SIX_MONTHS: NO
PRIOR_FUNCTIONAL_LEVEL_COMMENT: INDEP
COGNITION: 0 - NO COGNITION ISSUES REPORTED

## 2017-09-10 NOTE — PLAN OF CARE
Problem: Discharge Planning  Goal: Discharge Planning (Adult, OB, Behavioral, Peds)  PRIMARY DIAGNOSIS: CHEST PAIN  OUTPATIENT/OBSERVATION GOALS TO BE MET BEFORE DISCHARGE:  1. Ruled out acute coronary syndrome (negative or stable Troponin): Yes, trops neg x 3  2. Pain Status: Denies pain  3. Appropriate provocative testing performed: No  - Stress Test Procedure: ex stress test  - Interpretation of cardiac rhythm per telemetry tech: SR HR 70's     4. Cleared by Consultants (if applicable): N/A  5. Return to near baseline physical activity: Yes  Pt A&Ox4, VSS. Pt denies any SOB, chest pain, or nausea at this time. Tolerating regular diet. Per pt request, lipid panel lab draw in AM. Trops neg x 3. Ex stress in AM. Tele monitoring in place. Will continue to monitor, assess, and offer supportive cares.  Discharge Planner Nurse   Safe discharge environment identified: Yes  Barriers to discharge: Yes       Entered by: Yenifer Tijerina 09/10/2017 2:40 AM     Please review provider order for any additional goals.   Nurse to notify provider when observation goals have been met and patient is ready for discharge.

## 2017-09-10 NOTE — PLAN OF CARE
Problem: Discharge Planning  Goal: Discharge Planning (Adult, OB, Behavioral, Peds)  PRIMARY DIAGNOSIS: CHEST PAIN  OUTPATIENT/OBSERVATION GOALS TO BE MET BEFORE DISCHARGE:  1. Ruled out acute coronary syndrome (negative or stable Troponin): Yes, trops neg x 3  2. Pain Status: Denies pain  3. Appropriate provocative testing performed: No- angiogram on 11th  - Stress Test Procedure: done   - Interpretation of cardiac rhythm per telemetry tech: SR       4. Cleared by Consultants (if applicable): N/A  5. Return to near baseline physical activity: Yes- moving ind   Pt denies chest pain and cardiac symptoms. Moving ind. Tropx3 negative. Stress test back-> cards saw based off  Test results.  Will have angiogram on Monday. NPO at midnight. Will start heparin. PA okay'd coffee okay.   Discharge Planner Nurse   Safe discharge environment identified: Yes  Barriers to discharge: Yes       Entered by: Alexia Hernandez 1200      Please review provider order for any additional goals.   Nurse to notify provider when observation goals have been met and patient is ready for discharge.

## 2017-09-10 NOTE — PROGRESS NOTES
"[facility]  Hospitalist Progress Note  Anastasia Jimenez PA-C 09/10/2017    Reason for Stay (Diagnosis): chest discomfort with abnormal exercise stress echo concerning for progressive coronary artery disease         Assessment and Plan:      Summary of Stay: Ania Castillo is a 67 year old female admitted on 9/9/2017 with chest discomfort.      Problem List:     1. Exertional chest discomfort in setting of CAD: Abnormal exercise stress echo today suggestive of possible LAD distribution ischemia. Started heparin today. Planning for coronary angiogram tomorrow. Will change to inpatient status. Resumed PTA metoprolol 25 mg daily now the stress echo is complete. Continue PTA aspirin 81 mg daily, losartan 25 mg daily, simvastatin 40 mg HS, and zetia 10 mg daily. Changed to inpatient status.    2. Headache: Resolved w/ prn tylenol. Has prn atarax and flexeril also available. Avoid NSAIDs w/ hx of chronic dyspepsia.    3. Chronic nonulcer dyspepsia: Symptoms well-controlled. Continue PTA omeprazole 20 mg daily and nortriptyline 40 mg HS. No NSAIDs.    DVT Prophylaxis: On intravenous heparin   Code Status: Full Code  Discharge Dispo: Anticipate 1-2 days  Estimated Disch Date / # of Days until Disch: 1-2 days.        Interval History (Subjective):      Patient reports that her chest discomfort has resolved since yesterday. She continues to have no chest pain, palpitations, or shortness of breath. She denies headache, reports it improved with tylenol and just has some mild tenderness in the posterior right > left. She denies nausea, vomiting, diaphoresis, or lightheadedness. No abdominal pain or diarrhea.                  Physical Exam:      Last Vital Signs:  /60  Pulse 78  Temp 97  F (36.1  C) (Oral)  Resp 17  Ht 1.626 m (5' 4\")  Wt 61.4 kg (135 lb 6.4 oz)  SpO2 95%  BMI 23.24 kg/m2      Intake/Output Summary (Last 24 hours) at 09/10/17 1562  Last data filed at 09/10/17 1455   Gross per 24 hour "   Intake              210 ml   Output                0 ml   Net              210 ml       Constitutional: Awake, alert, cooperative, no apparent distress   Respiratory: Clear to auscultation bilaterally, no crackles or wheezing   Cardiovascular: Regular rate and rhythm, normal S1 and S2, and no murmur noted   Abdomen: Normal bowel sounds, soft, non-distended, non-tender   Skin: No rashes, no cyanosis, dry to touch   Neuro: Alert and oriented x3, no weakness, numbness, memory loss   Extremities: No edema, normal range of motion   Other(s):        All other systems: Negative          Medications:      All current medications were reviewed with changes reflected in problem list.         Data:      All new lab and imaging data was reviewed.   Labs & Imaging:  Results for orders placed or performed during the hospital encounter of 09/09/17 (from the past 48 hour(s))   EKG 12-lead, tracing only   Result Value Ref Range    Interpretation ECG Click View Image link to view waveform and result    CBC with platelets differential   Result Value Ref Range    WBC 7.7 4.0 - 11.0 10e9/L    RBC Count 4.49 3.8 - 5.2 10e12/L    Hemoglobin 13.1 11.7 - 15.7 g/dL    Hematocrit 39.6 35.0 - 47.0 %    MCV 88 78 - 100 fl    MCH 29.2 26.5 - 33.0 pg    MCHC 33.1 31.5 - 36.5 g/dL    RDW 12.7 10.0 - 15.0 %    Platelet Count 310 150 - 450 10e9/L    Diff Method Automated Method     % Neutrophils 52.6 %    % Lymphocytes 36.7 %    % Monocytes 7.7 %    % Eosinophils 2.2 %    % Basophils 0.5 %    % Immature Granulocytes 0.3 %    Nucleated RBCs 0 0 /100    Absolute Neutrophil 4.0 1.6 - 8.3 10e9/L    Absolute Lymphocytes 2.8 0.8 - 5.3 10e9/L    Absolute Monocytes 0.6 0.0 - 1.3 10e9/L    Absolute Eosinophils 0.2 0.0 - 0.7 10e9/L    Absolute Basophils 0.0 0.0 - 0.2 10e9/L    Abs Immature Granulocytes 0.0 0 - 0.4 10e9/L    Absolute Nucleated RBC 0.0    Basic metabolic panel   Result Value Ref Range    Sodium 140 133 - 144 mmol/L    Potassium 4.0 3.4 - 5.3  mmol/L    Chloride 105 94 - 109 mmol/L    Carbon Dioxide 26 20 - 32 mmol/L    Anion Gap 9 3 - 14 mmol/L    Glucose 121 (H) 70 - 99 mg/dL    Urea Nitrogen 11 7 - 30 mg/dL    Creatinine 0.74 0.52 - 1.04 mg/dL    GFR Estimate 78 >60 mL/min/1.7m2    GFR Estimate If Black >90 >60 mL/min/1.7m2    Calcium 8.9 8.5 - 10.1 mg/dL   Troponin I   Result Value Ref Range    Troponin I ES <0.015 0.000 - 0.045 ug/L   Magnesium   Result Value Ref Range    Magnesium 2.2 1.6 - 2.3 mg/dL   XR Chest 2 Views    Narrative    CHEST TWO VIEWS 2017 1:03 PM     HISTORY: Chest pain.    COMPARISON: 11/10/2014.    FINDINGS: Small hiatal hernia. Nothing acute. No interval change.      Impression    IMPRESSION: Small hiatal hernia. Nothing acute.    JENNIFER MCRBIDE MD   Troponin I   Result Value Ref Range    Troponin I ES <0.015 0.000 - 0.045 ug/L   Troponin I   Result Value Ref Range    Troponin I ES <0.015 0.000 - 0.045 ug/L   Lipid panel reflex to direct LDL   Result Value Ref Range    Cholesterol 197 <200 mg/dL    Triglycerides 142 <150 mg/dL    HDL Cholesterol 59 >49 mg/dL    LDL Cholesterol Calculated 110 (H) <100 mg/dL    Non HDL Cholesterol 138 (H) <130 mg/dL   ECHO STRESS WITH OPTISON    Narrative    [id number]  [id number]  [id number]  [id number]^ANN^YEYO^[facility]  Echocardiography Laboratory  [address] [place] [address]        Name: JOSE LUIS NASH  MRN: [id number]  : 1950  Study Date: 09/10/2017 08:25 AM  Age: 67 yrs  Gender: Female  Patient Location: [facility]  Reason For Study: Chest Pain  History: Chest Pain  Ordering Physician: YEYO JUAREZ  Referring Physician: Ana Andres  Performed By: Chin Cruz RDCS     BSA: 1.6 m2  Height: 64 in  Weight: 132 lb  HR: 66  BP: 104/72 mmHg  _____________________________________________________________________________  __        Procedure  Stress Echo Complete. Contrast  Optison.  _____________________________________________________________________________  __        Interpretation Summary  Abnormal stress echocardiogram with more pronounced dyskinesis of the apical  anterior and apical inferior walls post exercise. The stress EKG is negative  for ischemia.  _____________________________________________________________________________  __     Stress  RPP 25561.  There was a normal BP response to exercise.  The patient exhibited no chest pain during exercise.  Exercise was stopped due to fatigue.  Target Heart Rate was achieved.  The EKG portion of this stress test was negative for inducible ischemia (see  echo results below).  Arrhythmia induced during stress: occasional PVC's.  Left ventricular cavity size decreases with exercise.  Global LV systolic function augments with exercise.  The apical anterior and apical inferior segments are dyskinetic post exercise,  findings which may be consistent with ischemia in the LAD distribution.     Baseline  The patient is in normal sinus rhythm.  Baseline ECG displays Q waves in the anterior leads.  The visual ejection fraction is estimated at 50-55%.  Akinesis of the true left ventricular apex as well as the apical septal and  apical lateral walls is noted.     Stress Results             Protocol:  Bryon          Maximum Predicted HR:   153 bpm             Target HR: 130 bpm        % Maximum Predicted HR: 94 %        Stage  DurationHeart Rate  BP                    Comment             (mm:ss)   (bpm)    Stage 1   3:00      117   104/64    Stage 2   3:00      127   110/60    Stage 3   2:00      144   112/58Functional Aerobic Capacity: Above Average   RecoveryR  6:00      83     94/58[facility] Treadmill Score: 8 (Low Risk)               Stress Duration:   8:00 mm:ss *        Recovery Time: 6:00 mm:ss         Maximum Stress HR: 144 bpm *           METS:          10     Tricuspid Valve  There is mild (1+) tricuspid regurgitation.        Aortic  Valve  There is mild (1+) aortic regurgitation. No aortic stenosis is present.  _____________________________________________________________________________  __           Doppler Measurements & Calculations  AI P1/2t: 929.7 msec  TR max cachorro: 237.0 cm/sec  TR max P.5 mmHg           _____________________________________________________________________________  __           Report approved by: Jodee Wagner 09/10/2017 09:24 AM      Cardiology IP Consult: Patient to be seen: Routine - within 24 hours; abnormal exercise stress echo; Consultant may enter orders: Yes    Narrative    Rishi Merino MD     9/10/2017 10:49 AM  Dictated. Cath tomorrow.

## 2017-09-10 NOTE — PLAN OF CARE
Problem: Cardiac: Acute Coronary Syndrome (ACS) (Adult)  Goal: Signs and Symptoms of Listed Potential Problems Will be Absent or Manageable (Cardiac: Acute Coronary Syndrome)  Signs and symptoms of listed potential problems will be absent or manageable by discharge/transition of care (reference Cardiac: Acute Coronary Syndrome (ACS) (Adult) CPG).   Outcome: No Change  RN SHIFT SUMMARY :  DX/STORY : admit from home with CP, had been taking Ntg SL every tunde last week, recent HA's   HX : CAD, HTN, MI x2 -  & , rt shoulder tear-   LABS/PROTOCOLS :neg trops , on heparin gtt at 700u. RX following HepXA levels, ordered K for 6am   TELE : SR   ASSESS : a/o, very pleasant, up indep. No further CP since transfer from [facility] this afternoon . Started IV Heparin ,   TEACHING : instructed on current meds & has prior knowledge of C.Angio & Angioplasty from past - she did re-visit   Reading the angio/Angioplasty/stent handout  PLAN : at baseline is  originally  From [place] - spouse  1.5 yrs ago & lives in [place] with son- speaks great English. Cont. POC of Monitoring for CP, Cardiology consulting for angio Mon.  mid-day . Has Hep. gtt:   Going

## 2017-09-10 NOTE — CONSULTS
CARDIOLOGY CONSULTATION       REASON FOR CONSULTATION:  Exertional chest discomfort, history of coronary artery disease, abnormal stress echocardiogram.      HISTORY OF PRESENT ILLNESS:  I had the pleasure of seeing Ms. Ania Castillo in consultation at [facility] today.  She is a very pleasant 67-year-old female who follows with Dr. Mario in our Cardiology Clinic.  She has a history of coronary artery disease with an anterior myocardial infarction in the early 2000s.  She has also undergone coronary angiography in 2007 for a non-ST elevation myocardial infarction.  At the time, the patient underwent balloon angioplasty of the distal LAD.  The anatomy was not thought to be optimal for stent placement at that time due to the long nature of the lesion and small size of the coronary artery.  Her last coronary angiogram in 11/2012 demonstrated an occlusion of the terminal portion of the distal PDA.  This was again thought to be too small a vessel for successful intervention and medical therapy was recommended.      Her last echocardiogram in 2014 demonstrated mildly reduced left ventricular systolic function with apical wall motion abnormalities.  Her last nuclear stress test in 03/2016 demonstrated a small to moderate-sized fixed apical defect consistent with nontransmural infarction.  No ischemia was noted.  Left ventricular systolic function was normal at 57%.      Regarding her risk factors, her dyslipidemia has been managed to simvastatin and Zetia given that she has been intolerant to atorvastatin and rosuvastatin in the past.  She last saw Dr. Mario in 01/2017, at which point she was stable overall from a cardiac standpoint.      She presented to [facility] yesterday with concerns of chest discomfort that appear to be exertional and have been ongoing for the past week or so.  She describes a daily pattern of chest discomfort that appears to be related to exertion.  The symptoms are relieved by  nitroglycerin, which she has been taking on an almost daily basis since the past week.  Yesterday she started experiencing a headache in addition to chest discomfort and presented to [facility] for further evaluation.      Her admission EKG demonstrated no acute ST-T wave abnormalities and troponins were negative.  She underwent an exercise stress echocardiogram today and actually exercised for approximately 8 minutes according to the Bryon protocol.  She had no chest pain with exercise, but her post-exercise images were remarkable for pronounced dyskinesis of the apical inferior and apical anterior wall in particular when compared to the resting images.  Therefore, cardiac consultation was requested.      PAST MEDICAL HISTORY:   1.  Coronary artery disease as described above.   2.  History of chronic non-ulcerative dyspepsia.   3.  Dyslipidemia.   4.  Hypertension.       FAMILY HISTORY:  There is a history of coronary artery disease in her mother.      SOCIAL HISTORY:  There is no history of tobacco, illicit drug use or alcohol use.      ALLERGIES:  She has intolerances atorvastatin and rosuvastatin due to myalgias.        HOME MEDICATIONS:  Include acetaminophen, nitroglycerin, simvastatin, Zetia, losartan, metoprolol, aspirin and nortriptyline.      REVIEW OF SYSTEMS:  A comprehensive 10-point review of systems was performed and negative with exception as in the HPI.      PHYSICAL EXAMINATION:   IN GENERAL:  She was alert and oriented.   VITAL SIGNS:  Her blood pressure was 125/60, pulse 67.   NECK:  Revealed no jugular venous distention or carotid bruits.   CHEST:  Clear to auscultation.   CARDIOVASCULAR:  Regular rhythm, no murmurs appreciated.   ABDOMEN:  Soft, nontender.   EXTREMITIES:  Demonstrated no edema.     DERMATOLOGIC:  Examination revealed no rashes.   NEUROLOGIC:  Muscle strength is 5/5 in all 4 extremities.  Cranial nerves II-XII are intact.      Her EKG upon presentation demonstrated normal  sinus rhythm with Q-waves consistent with a previous history of an anterior myocardial infarction.      PERTINENT LABS:  Include hemoglobin of 13.1, WBC count of 7.7, platelets were 310, creatinine was normal at 0.74, troponins have been negative x2.  Admission chest x-ray demonstrated a small hiatal hernia, but no acute abnormalities.      IMPRESSION:   1.  Exertional chest discomfort over the past week with an abnormal stress echocardiogram suggestive of possible LAD distribution ischemia.   2.  History of coronary artery disease with percutaneous transluminal coronary angioplasty of the LAD in 2007 and a distal occlusion of the PDA in 2012.   3.  Dyslipidemia with intolerance to atorvastatin and rosuvastatin.  Her LDL this morning was 110 mg/dL.   4.  Hypertension.       Ms. Castillo presents with a history of exertional chest discomfort over the past week or so that was relieved with nitroglycerin.  The accelerating nature of her symptoms and her abnormal stress echocardiogram are concerning for progressive coronary artery disease in the LAD distribution.  I think that coronary angiography is indicated for delineation of coronary anatomy and revascularization if clinically indicated.  I explained the indications, risks and benefits of coronary angiography with the patient in detail, and she is agreeable to proceeding.  She understands the risks include but are not limited to significant bleeding, MI, CVA and even death.  She understands the risks and is willing to proceed.      From a medical therapy standpoint, I would initiate intravenous heparin at this time.  Her other medications are appropriate and I will make no changes today.  However, given her subtherapeutic LDL, the option of PCSK9 inhibitor should be considered.      Thank you for this consultation.         SHAHBAZ RAUSCH MD             D: 09/10/2017 10:48   T: 09/10/2017 12:32   MT: [id number]      Name:     JOSE LUIS CASTILLO   MRN:      [id number]         Account:       [id number]   :      1950           Consult Date:  09/10/2017      Document: [id number]

## 2017-09-10 NOTE — PLAN OF CARE
Problem: Discharge Planning  Goal: Discharge Planning (Adult, OB, Behavioral, Peds)  PRIMARY DIAGNOSIS: CHEST PAIN  OUTPATIENT/OBSERVATION GOALS TO BE MET BEFORE DISCHARGE:  1. Ruled out acute coronary syndrome (negative or stable Troponin): Yes, trops neg x 3  2. Pain Status: Denies pain  3. Appropriate provocative testing performed: No  - Stress Test Procedure: ex stress test- pending result   - Interpretation of cardiac rhythm per telemetry tech: SR       4. Cleared by Consultants (if applicable): N/A  5. Return to near baseline physical activity: Yes- moving ind   Pt denies chest pain and cardiac symptoms. Moving ind. Tropx3 negative. NPO for stress test.Tele NSR.   Discharge Planner Nurse   Safe discharge environment identified: Yes  Barriers to discharge: Yes       Entered by: Alexia Hernandez 0800      Please review provider order for any additional goals.   Nurse to notify provider when observation goals have been met and patient is ready for discharge.

## 2017-09-10 NOTE — PLAN OF CARE
Problem: Discharge Planning  Goal: Discharge Planning (Adult, OB, Behavioral, Peds)  Report given to 3rd floor nurseRonit

## 2017-09-10 NOTE — PLAN OF CARE
Problem: Discharge Planning  Goal: Discharge Planning (Adult, OB, Behavioral, Peds)  Problem: Discharge Planning  Goal: Discharge Planning (Adult, OB, Behavioral, Peds)  PRIMARY DIAGNOSIS: CHEST PAIN  OUTPATIENT/OBSERVATION GOALS TO BE MET BEFORE DISCHARGE:  1. Ruled out acute coronary syndrome (negative or stable Troponin): Yes, trops neg x 3  2. Pain Status: Improved-controlled with oral pain medications.  3. Appropriate provocative testing performed: No  - Stress Test Procedure: ex stress test in AM  - Interpretation of cardiac rhythm per telemetry tech: SR HR 77      4. Cleared by Consultants (if applicable): N/A  5. Return to near baseline physical activity: Yes  Pt A&Ox4, VSS. Pt reports mild headache and neck heaviness has resolved completely. Pt denies any SOB, chest pain, or nausea at this time. Tolerating regular diet. Per pt request, lipid panel lab draw in AM. Trops neg x 3. Ex stress in AM. Will continue to monitor.  Discharge Planner Nurse   Safe discharge environment identified: Yes  Barriers to discharge: Yes       Entered by: Lolita Fortune 09/09/2017 6:22 PM     Please review provider order for any additional goals.   Nurse to notify provider when observation goals have been met and patient is ready for discharge.

## 2017-09-10 NOTE — PLAN OF CARE
Problem: Discharge Planning  Goal: Discharge Planning (Adult, OB, Behavioral, Peds)  PRIMARY DIAGNOSIS: CHEST PAIN  OUTPATIENT/OBSERVATION GOALS TO BE MET BEFORE DISCHARGE:  1. Ruled out acute coronary syndrome (negative or stable Troponin): Yes, trops neg x 3  2. Pain Status: Denies pain  3. Appropriate provocative testing performed: No  - Stress Test Procedure: ex stress test  - Interpretation of cardiac rhythm per telemetry tech: SR      4. Cleared by Consultants (if applicable): N/A  5. Return to near baseline physical activity: Yes  Pt A&Ox4, VSS. Pt denies any SOB, chest pain, or nausea at this time. Tolerating regular diet. Per pt request, lipid panel lab draw in AM. Trops neg x 3. Ex stress in AM. Tele monitoring in place. Will continue to monitor, assess, and offer supportive cares.  Discharge Planner Nurse   Safe discharge environment identified: Yes  Barriers to discharge: Yes       Entered by: Yenifer Tijerina 09/10/2017 5:23 AM     Please review provider order for any additional goals.   Nurse to notify provider when observation goals have been met and patient is ready for discharge.

## 2017-09-11 ENCOUNTER — APPOINTMENT (OUTPATIENT)
Dept: CARDIOLOGY | Facility: CLINIC | Age: 67
DRG: 287 | End: 2017-09-11
Attending: INTERNAL MEDICINE
Payer: MEDICARE

## 2017-09-11 LAB
LMWH PPP CHRO-ACNC: 0.47 IU/ML
LMWH PPP CHRO-ACNC: 0.85 IU/ML
POTASSIUM SERPL-SCNC: 4.3 MMOL/L (ref 3.4–5.3)

## 2017-09-11 PROCEDURE — A9270 NON-COVERED ITEM OR SERVICE: HCPCS | Mod: GY | Performed by: INTERNAL MEDICINE

## 2017-09-11 PROCEDURE — B2151ZZ FLUOROSCOPY OF LEFT HEART USING LOW OSMOLAR CONTRAST: ICD-10-PCS | Performed by: INTERNAL MEDICINE

## 2017-09-11 PROCEDURE — C1769 GUIDE WIRE: HCPCS

## 2017-09-11 PROCEDURE — 25000132 ZZH RX MED GY IP 250 OP 250 PS 637: Mod: GY | Performed by: INTERNAL MEDICINE

## 2017-09-11 PROCEDURE — 36415 COLL VENOUS BLD VENIPUNCTURE: CPT | Performed by: HOSPITALIST

## 2017-09-11 PROCEDURE — B2111ZZ FLUOROSCOPY OF MULTIPLE CORONARY ARTERIES USING LOW OSMOLAR CONTRAST: ICD-10-PCS | Performed by: INTERNAL MEDICINE

## 2017-09-11 PROCEDURE — C1894 INTRO/SHEATH, NON-LASER: HCPCS

## 2017-09-11 PROCEDURE — 93458 L HRT ARTERY/VENTRICLE ANGIO: CPT | Mod: 26 | Performed by: INTERNAL MEDICINE

## 2017-09-11 PROCEDURE — 4A023N7 MEASUREMENT OF CARDIAC SAMPLING AND PRESSURE, LEFT HEART, PERCUTANEOUS APPROACH: ICD-10-PCS | Performed by: INTERNAL MEDICINE

## 2017-09-11 PROCEDURE — A9270 NON-COVERED ITEM OR SERVICE: HCPCS | Mod: GY | Performed by: PHYSICIAN ASSISTANT

## 2017-09-11 PROCEDURE — 99232 SBSQ HOSP IP/OBS MODERATE 35: CPT | Performed by: INTERNAL MEDICINE

## 2017-09-11 PROCEDURE — 99152 MOD SED SAME PHYS/QHP 5/>YRS: CPT | Performed by: INTERNAL MEDICINE

## 2017-09-11 PROCEDURE — 27210742 ZZH CATH CR1

## 2017-09-11 PROCEDURE — 93458 L HRT ARTERY/VENTRICLE ANGIO: CPT

## 2017-09-11 PROCEDURE — 27210946 ZZH KIT HC TOTES DISP CR8

## 2017-09-11 PROCEDURE — 27210827 ZZH KIT ACIST INJECTOR CR6

## 2017-09-11 PROCEDURE — 25000128 H RX IP 250 OP 636: Performed by: INTERNAL MEDICINE

## 2017-09-11 PROCEDURE — 84132 ASSAY OF SERUM POTASSIUM: CPT | Performed by: HOSPITALIST

## 2017-09-11 PROCEDURE — 99152 MOD SED SAME PHYS/QHP 5/>YRS: CPT

## 2017-09-11 PROCEDURE — 25000132 ZZH RX MED GY IP 250 OP 250 PS 637: Mod: GY | Performed by: PHYSICIAN ASSISTANT

## 2017-09-11 PROCEDURE — 25000125 ZZHC RX 250: Performed by: INTERNAL MEDICINE

## 2017-09-11 PROCEDURE — 27210856 ZZH ACCESS HEART CATH CR2

## 2017-09-11 PROCEDURE — 85520 HEPARIN ASSAY: CPT | Performed by: HOSPITALIST

## 2017-09-11 PROCEDURE — 99153 MOD SED SAME PHYS/QHP EA: CPT

## 2017-09-11 PROCEDURE — 12000007 ZZH R&B INTERMEDIATE

## 2017-09-11 RX ORDER — NIFEDIPINE 10 MG/1
10 CAPSULE ORAL
Status: DISCONTINUED | OUTPATIENT
Start: 2017-09-11 | End: 2017-09-11 | Stop reason: HOSPADM

## 2017-09-11 RX ORDER — FUROSEMIDE 10 MG/ML
20-100 INJECTION INTRAMUSCULAR; INTRAVENOUS
Status: DISCONTINUED | OUTPATIENT
Start: 2017-09-11 | End: 2017-09-11 | Stop reason: HOSPADM

## 2017-09-11 RX ORDER — FENTANYL CITRATE 50 UG/ML
25-50 INJECTION, SOLUTION INTRAMUSCULAR; INTRAVENOUS
Status: DISCONTINUED | OUTPATIENT
Start: 2017-09-11 | End: 2017-09-11 | Stop reason: HOSPADM

## 2017-09-11 RX ORDER — ATROPINE SULFATE 0.1 MG/ML
0.5 INJECTION INTRAVENOUS EVERY 5 MIN PRN
Status: ACTIVE | OUTPATIENT
Start: 2017-09-11 | End: 2017-09-12

## 2017-09-11 RX ORDER — CLOPIDOGREL BISULFATE 75 MG/1
75 TABLET ORAL
Status: DISCONTINUED | OUTPATIENT
Start: 2017-09-11 | End: 2017-09-11 | Stop reason: HOSPADM

## 2017-09-11 RX ORDER — METHYLPREDNISOLONE SODIUM SUCCINATE 125 MG/2ML
125 INJECTION, POWDER, LYOPHILIZED, FOR SOLUTION INTRAMUSCULAR; INTRAVENOUS
Status: DISCONTINUED | OUTPATIENT
Start: 2017-09-11 | End: 2017-09-11 | Stop reason: HOSPADM

## 2017-09-11 RX ORDER — EPTIFIBATIDE 2 MG/ML
180 INJECTION, SOLUTION INTRAVENOUS EVERY 10 MIN PRN
Status: DISCONTINUED | OUTPATIENT
Start: 2017-09-11 | End: 2017-09-11 | Stop reason: HOSPADM

## 2017-09-11 RX ORDER — ATROPINE SULFATE 0.1 MG/ML
.5-1 INJECTION INTRAVENOUS
Status: DISCONTINUED | OUTPATIENT
Start: 2017-09-11 | End: 2017-09-11 | Stop reason: HOSPADM

## 2017-09-11 RX ORDER — ASPIRIN 81 MG/1
81-324 TABLET, CHEWABLE ORAL
Status: DISCONTINUED | OUTPATIENT
Start: 2017-09-11 | End: 2017-09-11 | Stop reason: HOSPADM

## 2017-09-11 RX ORDER — BUPIVACAINE HYDROCHLORIDE 2.5 MG/ML
1-10 INJECTION, SOLUTION EPIDURAL; INFILTRATION; INTRACAUDAL
Status: DISCONTINUED | OUTPATIENT
Start: 2017-09-11 | End: 2017-09-11 | Stop reason: HOSPADM

## 2017-09-11 RX ORDER — NITROGLYCERIN 0.4 MG/1
0.4 TABLET SUBLINGUAL EVERY 5 MIN PRN
Status: DISCONTINUED | OUTPATIENT
Start: 2017-09-11 | End: 2017-09-11 | Stop reason: HOSPADM

## 2017-09-11 RX ORDER — METOPROLOL TARTRATE 1 MG/ML
5 INJECTION, SOLUTION INTRAVENOUS EVERY 5 MIN PRN
Status: DISCONTINUED | OUTPATIENT
Start: 2017-09-11 | End: 2017-09-11 | Stop reason: HOSPADM

## 2017-09-11 RX ORDER — NITROGLYCERIN 20 MG/100ML
.07-2 INJECTION INTRAVENOUS CONTINUOUS PRN
Status: DISCONTINUED | OUTPATIENT
Start: 2017-09-11 | End: 2017-09-11 | Stop reason: HOSPADM

## 2017-09-11 RX ORDER — ADENOSINE 3 MG/ML
12-12000 INJECTION, SOLUTION INTRAVENOUS
Status: DISCONTINUED | OUTPATIENT
Start: 2017-09-11 | End: 2017-09-11 | Stop reason: HOSPADM

## 2017-09-11 RX ORDER — ENALAPRILAT 1.25 MG/ML
1.25-2.5 INJECTION INTRAVENOUS
Status: DISCONTINUED | OUTPATIENT
Start: 2017-09-11 | End: 2017-09-11 | Stop reason: HOSPADM

## 2017-09-11 RX ORDER — NITROGLYCERIN 5 MG/ML
100-500 VIAL (ML) INTRAVENOUS
Status: COMPLETED | OUTPATIENT
Start: 2017-09-11 | End: 2017-09-11

## 2017-09-11 RX ORDER — HEPARIN SODIUM 1000 [USP'U]/ML
INJECTION, SOLUTION INTRAVENOUS; SUBCUTANEOUS
Status: DISCONTINUED
Start: 2017-09-11 | End: 2017-09-11 | Stop reason: HOSPADM

## 2017-09-11 RX ORDER — FENTANYL CITRATE 50 UG/ML
INJECTION, SOLUTION INTRAMUSCULAR; INTRAVENOUS
Status: DISCONTINUED
Start: 2017-09-11 | End: 2017-09-11 | Stop reason: HOSPADM

## 2017-09-11 RX ORDER — DOPAMINE HYDROCHLORIDE 160 MG/100ML
2-20 INJECTION, SOLUTION INTRAVENOUS CONTINUOUS PRN
Status: DISCONTINUED | OUTPATIENT
Start: 2017-09-11 | End: 2017-09-11 | Stop reason: HOSPADM

## 2017-09-11 RX ORDER — NALOXONE HYDROCHLORIDE 0.4 MG/ML
0.4 INJECTION, SOLUTION INTRAMUSCULAR; INTRAVENOUS; SUBCUTANEOUS EVERY 5 MIN PRN
Status: DISCONTINUED | OUTPATIENT
Start: 2017-09-11 | End: 2017-09-11 | Stop reason: HOSPADM

## 2017-09-11 RX ORDER — HYDRALAZINE HYDROCHLORIDE 20 MG/ML
10-20 INJECTION INTRAMUSCULAR; INTRAVENOUS
Status: DISCONTINUED | OUTPATIENT
Start: 2017-09-11 | End: 2017-09-11 | Stop reason: HOSPADM

## 2017-09-11 RX ORDER — HYDROCODONE BITARTRATE AND ACETAMINOPHEN 5; 325 MG/1; MG/1
1-2 TABLET ORAL EVERY 4 HOURS PRN
Status: DISCONTINUED | OUTPATIENT
Start: 2017-09-11 | End: 2017-09-12 | Stop reason: HOSPADM

## 2017-09-11 RX ORDER — SODIUM NITROPRUSSIDE 25 MG/ML
100-200 INJECTION INTRAVENOUS
Status: DISCONTINUED | OUTPATIENT
Start: 2017-09-11 | End: 2017-09-11 | Stop reason: HOSPADM

## 2017-09-11 RX ORDER — NITROGLYCERIN 5 MG/ML
VIAL (ML) INTRAVENOUS
Status: DISCONTINUED
Start: 2017-09-11 | End: 2017-09-11 | Stop reason: HOSPADM

## 2017-09-11 RX ORDER — POTASSIUM CHLORIDE 7.45 MG/ML
10 INJECTION INTRAVENOUS
Status: DISCONTINUED | OUTPATIENT
Start: 2017-09-11 | End: 2017-09-11 | Stop reason: HOSPADM

## 2017-09-11 RX ORDER — NICARDIPINE HYDROCHLORIDE 2.5 MG/ML
100 INJECTION INTRAVENOUS
Status: DISCONTINUED | OUTPATIENT
Start: 2017-09-11 | End: 2017-09-11 | Stop reason: HOSPADM

## 2017-09-11 RX ORDER — ASPIRIN 325 MG
325 TABLET ORAL
Status: DISCONTINUED | OUTPATIENT
Start: 2017-09-11 | End: 2017-09-11 | Stop reason: HOSPADM

## 2017-09-11 RX ORDER — HEPARIN SODIUM 1000 [USP'U]/ML
1000-10000 INJECTION, SOLUTION INTRAVENOUS; SUBCUTANEOUS EVERY 5 MIN PRN
Status: DISCONTINUED | OUTPATIENT
Start: 2017-09-11 | End: 2017-09-11 | Stop reason: HOSPADM

## 2017-09-11 RX ORDER — VERAPAMIL HYDROCHLORIDE 2.5 MG/ML
1-2.5 INJECTION, SOLUTION INTRAVENOUS
Status: COMPLETED | OUTPATIENT
Start: 2017-09-11 | End: 2017-09-11

## 2017-09-11 RX ORDER — NALOXONE HYDROCHLORIDE 0.4 MG/ML
.1-.4 INJECTION, SOLUTION INTRAMUSCULAR; INTRAVENOUS; SUBCUTANEOUS
Status: DISCONTINUED | OUTPATIENT
Start: 2017-09-11 | End: 2017-09-12 | Stop reason: HOSPADM

## 2017-09-11 RX ORDER — DIPHENHYDRAMINE HYDROCHLORIDE 50 MG/ML
25-50 INJECTION INTRAMUSCULAR; INTRAVENOUS
Status: DISCONTINUED | OUTPATIENT
Start: 2017-09-11 | End: 2017-09-11 | Stop reason: HOSPADM

## 2017-09-11 RX ORDER — NITROGLYCERIN 5 MG/ML
100-200 VIAL (ML) INTRAVENOUS
Status: DISCONTINUED | OUTPATIENT
Start: 2017-09-11 | End: 2017-09-11 | Stop reason: HOSPADM

## 2017-09-11 RX ORDER — LIDOCAINE HYDROCHLORIDE 10 MG/ML
1-10 INJECTION, SOLUTION EPIDURAL; INFILTRATION; INTRACAUDAL; PERINEURAL
Status: COMPLETED | OUTPATIENT
Start: 2017-09-11 | End: 2017-09-11

## 2017-09-11 RX ORDER — DEXTROSE MONOHYDRATE 25 G/50ML
12.5-5 INJECTION, SOLUTION INTRAVENOUS EVERY 30 MIN PRN
Status: DISCONTINUED | OUTPATIENT
Start: 2017-09-11 | End: 2017-09-11 | Stop reason: HOSPADM

## 2017-09-11 RX ORDER — FLUMAZENIL 0.1 MG/ML
0.2 INJECTION, SOLUTION INTRAVENOUS
Status: DISCONTINUED | OUTPATIENT
Start: 2017-09-11 | End: 2017-09-11 | Stop reason: HOSPADM

## 2017-09-11 RX ORDER — LIDOCAINE 40 MG/G
CREAM TOPICAL
Status: DISCONTINUED | OUTPATIENT
Start: 2017-09-11 | End: 2017-09-12 | Stop reason: HOSPADM

## 2017-09-11 RX ORDER — PROTAMINE SULFATE 10 MG/ML
1-5 INJECTION, SOLUTION INTRAVENOUS
Status: DISCONTINUED | OUTPATIENT
Start: 2017-09-11 | End: 2017-09-11 | Stop reason: HOSPADM

## 2017-09-11 RX ORDER — POTASSIUM CHLORIDE 29.8 MG/ML
20 INJECTION INTRAVENOUS
Status: DISCONTINUED | OUTPATIENT
Start: 2017-09-11 | End: 2017-09-11 | Stop reason: HOSPADM

## 2017-09-11 RX ORDER — MORPHINE SULFATE 2 MG/ML
1-2 INJECTION, SOLUTION INTRAMUSCULAR; INTRAVENOUS EVERY 5 MIN PRN
Status: DISCONTINUED | OUTPATIENT
Start: 2017-09-11 | End: 2017-09-11 | Stop reason: HOSPADM

## 2017-09-11 RX ORDER — LORAZEPAM 2 MG/ML
.5-2 INJECTION INTRAMUSCULAR EVERY 4 HOURS PRN
Status: DISCONTINUED | OUTPATIENT
Start: 2017-09-11 | End: 2017-09-11 | Stop reason: HOSPADM

## 2017-09-11 RX ORDER — EPTIFIBATIDE 2 MG/ML
2 INJECTION, SOLUTION INTRAVENOUS CONTINUOUS PRN
Status: DISCONTINUED | OUTPATIENT
Start: 2017-09-11 | End: 2017-09-11 | Stop reason: HOSPADM

## 2017-09-11 RX ORDER — NALOXONE HYDROCHLORIDE 0.4 MG/ML
.2-.4 INJECTION, SOLUTION INTRAMUSCULAR; INTRAVENOUS; SUBCUTANEOUS
Status: ACTIVE | OUTPATIENT
Start: 2017-09-11 | End: 2017-09-12

## 2017-09-11 RX ORDER — ONDANSETRON 2 MG/ML
4 INJECTION INTRAMUSCULAR; INTRAVENOUS EVERY 4 HOURS PRN
Status: DISCONTINUED | OUTPATIENT
Start: 2017-09-11 | End: 2017-09-11 | Stop reason: HOSPADM

## 2017-09-11 RX ORDER — ASPIRIN 81 MG/1
81 TABLET ORAL DAILY
Status: DISCONTINUED | OUTPATIENT
Start: 2017-09-12 | End: 2017-09-12 | Stop reason: HOSPADM

## 2017-09-11 RX ORDER — PRASUGREL 10 MG/1
10-60 TABLET, FILM COATED ORAL
Status: DISCONTINUED | OUTPATIENT
Start: 2017-09-11 | End: 2017-09-11 | Stop reason: HOSPADM

## 2017-09-11 RX ORDER — PROMETHAZINE HYDROCHLORIDE 25 MG/ML
6.25-25 INJECTION, SOLUTION INTRAMUSCULAR; INTRAVENOUS EVERY 4 HOURS PRN
Status: DISCONTINUED | OUTPATIENT
Start: 2017-09-11 | End: 2017-09-11 | Stop reason: HOSPADM

## 2017-09-11 RX ORDER — LIDOCAINE HYDROCHLORIDE 10 MG/ML
30 INJECTION, SOLUTION EPIDURAL; INFILTRATION; INTRACAUDAL; PERINEURAL
Status: DISCONTINUED | OUTPATIENT
Start: 2017-09-11 | End: 2017-09-11 | Stop reason: HOSPADM

## 2017-09-11 RX ORDER — CLOPIDOGREL BISULFATE 75 MG/1
300-600 TABLET ORAL
Status: DISCONTINUED | OUTPATIENT
Start: 2017-09-11 | End: 2017-09-11 | Stop reason: HOSPADM

## 2017-09-11 RX ORDER — FLUMAZENIL 0.1 MG/ML
0.2 INJECTION, SOLUTION INTRAVENOUS
Status: ACTIVE | OUTPATIENT
Start: 2017-09-11 | End: 2017-09-12

## 2017-09-11 RX ORDER — ACETAMINOPHEN 325 MG/1
325-650 TABLET ORAL EVERY 4 HOURS PRN
Status: DISCONTINUED | OUTPATIENT
Start: 2017-09-11 | End: 2017-09-12 | Stop reason: HOSPADM

## 2017-09-11 RX ORDER — SODIUM CHLORIDE 9 MG/ML
INJECTION, SOLUTION INTRAVENOUS CONTINUOUS
Status: DISCONTINUED | OUTPATIENT
Start: 2017-09-11 | End: 2017-09-12 | Stop reason: HOSPADM

## 2017-09-11 RX ORDER — DOBUTAMINE HYDROCHLORIDE 200 MG/100ML
2-20 INJECTION INTRAVENOUS CONTINUOUS PRN
Status: DISCONTINUED | OUTPATIENT
Start: 2017-09-11 | End: 2017-09-11 | Stop reason: HOSPADM

## 2017-09-11 RX ORDER — IOPAMIDOL 755 MG/ML
100 INJECTION, SOLUTION INTRAVASCULAR ONCE
Status: COMPLETED | OUTPATIENT
Start: 2017-09-11 | End: 2017-09-11

## 2017-09-11 RX ORDER — ROSUVASTATIN CALCIUM 20 MG/1
20 TABLET, COATED ORAL DAILY
Status: DISCONTINUED | OUTPATIENT
Start: 2017-09-11 | End: 2017-09-12 | Stop reason: HOSPADM

## 2017-09-11 RX ORDER — PROTAMINE SULFATE 10 MG/ML
25-100 INJECTION, SOLUTION INTRAVENOUS EVERY 5 MIN PRN
Status: DISCONTINUED | OUTPATIENT
Start: 2017-09-11 | End: 2017-09-11 | Stop reason: HOSPADM

## 2017-09-11 RX ORDER — FENTANYL CITRATE 50 UG/ML
25-50 INJECTION, SOLUTION INTRAMUSCULAR; INTRAVENOUS
Status: ACTIVE | OUTPATIENT
Start: 2017-09-11 | End: 2017-09-12

## 2017-09-11 RX ORDER — VERAPAMIL HYDROCHLORIDE 2.5 MG/ML
INJECTION, SOLUTION INTRAVENOUS
Status: DISCONTINUED
Start: 2017-09-11 | End: 2017-09-11 | Stop reason: HOSPADM

## 2017-09-11 RX ORDER — PHENYLEPHRINE HCL IN 0.9% NACL 1 MG/10 ML
20-100 SYRINGE (ML) INTRAVENOUS
Status: DISCONTINUED | OUTPATIENT
Start: 2017-09-11 | End: 2017-09-11 | Stop reason: HOSPADM

## 2017-09-11 RX ADMIN — EZETIMIBE 10 MG: 10 TABLET ORAL at 19:22

## 2017-09-11 RX ADMIN — METOPROLOL SUCCINATE 25 MG: 25 TABLET, EXTENDED RELEASE ORAL at 09:04

## 2017-09-11 RX ADMIN — ACETAMINOPHEN 650 MG: 325 TABLET, FILM COATED ORAL at 19:05

## 2017-09-11 RX ADMIN — FENTANYL CITRATE 25 MCG: 50 INJECTION INTRAMUSCULAR; INTRAVENOUS at 13:53

## 2017-09-11 RX ADMIN — MIDAZOLAM 1 MG: 1 INJECTION INTRAMUSCULAR; INTRAVENOUS at 13:56

## 2017-09-11 RX ADMIN — NORTRIPTYLINE HYDROCHLORIDE 40 MG: 10 CAPSULE ORAL at 22:42

## 2017-09-11 RX ADMIN — NITROGLYCERIN 400 MCG: 5 INJECTION, SOLUTION INTRAVENOUS at 14:06

## 2017-09-11 RX ADMIN — MIDAZOLAM 1 MG: 1 INJECTION INTRAMUSCULAR; INTRAVENOUS at 13:53

## 2017-09-11 RX ADMIN — VERAPAMIL HYDROCHLORIDE 2.5 MG: 2.5 INJECTION, SOLUTION INTRAVENOUS at 14:06

## 2017-09-11 RX ADMIN — ASPIRIN 325 MG: 325 TABLET, DELAYED RELEASE ORAL at 09:04

## 2017-09-11 RX ADMIN — OMEPRAZOLE 20 MG: 20 CAPSULE, DELAYED RELEASE ORAL at 07:11

## 2017-09-11 RX ADMIN — ACETAMINOPHEN 975 MG: 325 TABLET, FILM COATED ORAL at 23:00

## 2017-09-11 RX ADMIN — FENTANYL CITRATE 25 MCG: 50 INJECTION INTRAMUSCULAR; INTRAVENOUS at 13:56

## 2017-09-11 RX ADMIN — ROSUVASTATIN CALCIUM 20 MG: 20 TABLET, FILM COATED ORAL at 13:18

## 2017-09-11 RX ADMIN — FENTANYL CITRATE 50 MCG: 50 INJECTION INTRAMUSCULAR; INTRAVENOUS at 14:05

## 2017-09-11 RX ADMIN — SODIUM CHLORIDE: 9 INJECTION, SOLUTION INTRAVENOUS at 15:30

## 2017-09-11 RX ADMIN — LIDOCAINE HYDROCHLORIDE 20 MG: 10 INJECTION, SOLUTION EPIDURAL; INFILTRATION; INTRACAUDAL; PERINEURAL at 14:22

## 2017-09-11 RX ADMIN — SODIUM CHLORIDE: 9 INJECTION, SOLUTION INTRAVENOUS at 09:06

## 2017-09-11 RX ADMIN — LOSARTAN POTASSIUM 25 MG: 25 TABLET ORAL at 09:05

## 2017-09-11 RX ADMIN — IOPAMIDOL 95 ML: 755 INJECTION, SOLUTION INTRAVASCULAR at 13:30

## 2017-09-11 NOTE — PLAN OF CARE
Problem: Cardiac: Acute Coronary Syndrome (ACS) (Adult)  Goal: Signs and Symptoms of Listed Potential Problems Will be Absent or Manageable (Cardiac: Acute Coronary Syndrome)  Signs and symptoms of listed potential problems will be absent or manageable by discharge/transition of care (reference Cardiac: Acute Coronary Syndrome (ACS) (Adult) CPG).   A&Ox4, VSS, denies pain, Tele SR, continious heparin gtt@7ml/hr, recheck Hep 10A at 0.7, stopped at 2200, will restart at 2300 per pharmacy, NPO at midnight for Coronary Angio tomorrow, educated patient about procedure, will continue with POC.

## 2017-09-11 NOTE — PLAN OF CARE
Problem: Goal Outcome Summary  Goal: Goal Outcome Summary  Outcome: No Change     VSS. Denies chest pain. Heparin gtt infusing. NPO for angio today. Tele - SB ST elevation. Will continue to monitor.    Pt returned from cath lab. Heparin discontinued. IVF infusing. Will start on clears once more alert.

## 2017-09-11 NOTE — PROGRESS NOTES
"                                       Hospitalist Progress Note([facility])  Carlin Pena MD 09/11/2017      Reason for Stay (Diagnosis/Sign and symptoms):  Chest pain and abn stress test          Assessment and Plan:        Summary of Stay:     Ania Castillo is a 67 year old [place]  female with a significant past medical history of coronary artery disease who presents with chest pain         Hospital Problem List with assessment and plan:       1. Chest pain and abnormal stress test -concerning for ACS   -- on bblocker/ASA/ARB/statin/Heparin  -- cardiology plan for angio noted   -- patient stable        2. HTN  -- Controlled     3. HPL   -- continue current medication           DVT Prophylaxis: Heparin     Code Status: Full Code    Likely Discharge Dispo: home when ok with cardiology team               Interval History (Subjective):             Patient was seen and examined by me today and medical record reviewed.Overnight events noted and care discussed with nursing staff and treatment team.    doing well; no cp, sob, n/v/d, or abd pain.                   Physical Exam:      Last Vital Signs:  /70 (BP Location: Right arm)  Pulse 78  Temp 97.7  F (36.5  C) (Oral)  Resp 16  Ht 1.626 m (5' 4\")  Wt 61.4 kg (135 lb 6.4 oz)  SpO2 97%  BMI 23.24 kg/m2        Constitutional: Awake, alert, cooperative, no apparent distress   Respiratory: Clear to auscultation bilaterally, no crackles or wheezing   Cardiovascular: Regular rate and rhythm, normal S1 and S2, and no murmur noted   Abdomen: Normal bowel sounds, soft, non-distended, non-tender   Skin: No rashes, no cyanosis, dry to touch   Neuro: Alert and oriented x3, no weakness, numbness, memory loss   Extremities: No edema, normal range of motion   Other(s):        All other systems: Negative          Medications:      All current medications were reviewed with changes reflected in problem list.         Data:            All laboratory and imaging data " in the past 24 hours reviewed       Recent Labs  Lab 09/09/17  1242   WBC 7.7   HGB 13.1   HCT 39.6   MCV 88        No results for input(s): CULT in the last 168 hours.    Recent Labs  Lab 09/11/17  0644 09/09/17  1242   NA  --  140   POTASSIUM 4.3 4.0   CHLORIDE  --  105   CO2  --  26   ANIONGAP  --  9   GLC  --  121*   BUN  --  11   CR  --  0.74   GFRESTIMATED  --  78   GFRESTBLACK  --  >90   TAMIA  --  8.9   MAG  --  2.2         Recent Labs  Lab 09/09/17  1242   *           No results for input(s): INR in the last 168 hours.        Recent Labs  Lab 09/09/17  2050 09/09/17  1710 09/09/17  1242   TROPI <0.015 <0.015 <0.015       No results found for this or any previous visit (from the past 48 hour(s)).              Disclaimer: This note consists of symbols derived from keyboarding, dictation and/or voice recognition software. As a result, there may be errors in the script that have gone undetected. Please consider this when interpreting information found in this chart

## 2017-09-11 NOTE — CONSULTS
I have examined the patient, reviewed the history, medications and pre procedural tests. Atypical chest pain, known distal occlusion of PDA and LAD. Enzymes negative stress echo abnormal but known RWMA apex on LV gram 2015.  I have explained to the patient the risks of death, MI, stroke, hematoma, possible urgent bypass surgery for failed PCI, use of stents, thienopyridine agents, possible peripheral vascular complications, arrhythmia, the use of FFR in clinical decision-making and alternative of medical therapy alone in regards to left heart catheterization, left ventriculography, coronary angiography, and possible percutaneous coronary intervention. The patient voiced understanding and wishes to proceed. The patient has a good right radial pulse, normal ulnar pulse and a normal Thomas's sign.

## 2017-09-11 NOTE — PLAN OF CARE
Problem: Goal Outcome Summary  Goal: Goal Outcome Summary  A&O. Up ind in room. Lung sounds: clear. Tele: SR with ST elevation. Denies chest pain. Diet: NPO. PIV: heparin gtt @ 500. Pain: denies. Angio today.

## 2017-09-11 NOTE — PROGRESS NOTES
"Cardiology Progress Note  MIQUEL Westbrook          Assessment and Plan:   Admit (9/9) with 1 wk progressive exertional CP relieved with NTG.  Evidence of abnormal STRECO & concern for ACS  PMH:  NSTEMI (07) with PTCA to dLAD  Cath (12) showing occluded dPDA  HTN, hyperlipidemia    ACS:  -NL troponin/inferior infarct & poor R-wave progression w/ possible anterolat ischemia  -STRECO (9/10)LVEF 50-55% w/ distal ant & apical inf hypokinesia   -previous NSTEMI & PTCA only to dLAD (07) and known occluded dPDA (12)  -No further CP since admit  -Plan for coronary angiogram today  -bblocker/ASA/ARB/statin/Heparin    HTN:  -BP controlled    Hyperlipidemia:  -previous intolerance to Crestor & Lipitor  - on Simvastatin & Zetia               Interval History:   Denies any CP/SOB/palpitations                Medications:       sodium chloride (PF)  10 mL Intravenous Once     sodium chloride (PF)  3 mL Intracatheter Q8H     aspirin EC  325 mg Oral Daily     metoprolol  25 mg Oral Daily     ezetimibe  10 mg Oral QPM     losartan  25 mg Oral Daily     nortriptyline (PAMELOR) capsule 40 mg  40 mg Oral At Bedtime     omeprazole  20 mg Oral Daily     simvastatin  40 mg Oral At Bedtime            Physical Exam:   Blood pressure 113/72, pulse 78, temperature 98.1  F (36.7  C), temperature source Oral, resp. rate 16, height 1.626 m (5' 4\"), weight 61.4 kg (135 lb 6.4 oz), SpO2 97 %.  Wt Readings from Last 3 Encounters:   09/09/17 61.4 kg (135 lb 6.4 oz)   01/10/17 62.8 kg (138 lb 8 oz)   03/08/16 63.5 kg (140 lb)     I/O last 3 completed shifts:  In: 210 [P.O.:210]  Out: -     CONST:  Alert and oriented  NAD  LUNGS:  CTA bilat  CARDIO:  RRR, S1, S2  No murmurs  ABD:  Soft  +BS  EXT:  No edema  2+DP bilat           Data:   TELE:  SR with ST elevation lead II    CBC    Recent Labs  Lab 09/09/17  1242   WBC 7.7   HGB 13.1          BMP    Recent Labs  Lab 09/11/17  0644 09/09/17  1242   NA  --  140   POTASSIUM 4.3 4.0   CHLORIDE "  --  105   TAMIA  --  8.9   CO2  --  26   BUN  --  11   CR  --  0.74   GLC  --  121*     Recent Labs   Lab Test  09/10/17   0600  01/10/17   0909  06/10/15   0723  12/11/14   0847   CHOL  197  220*  166  162   HDL  59  61  64  55   LDL  110*  143*  87  97   TRIG  142  80  77  50   CHOLHDLRATIO   --    --   2.6  2.9       TROP  Lab Results   Component Value Date    TROPI <0.015 09/09/2017    TROPI <0.015 09/09/2017    TROPI <0.015 09/09/2017    TROPI  11/11/2014     <0.015  The 99th percentile for upper reference range is 0.045 ug/L.  Troponin values in   the range of 0.045 - 0.120 ug/L may be associated with risks of adverse   clinical events.   Effective 7/30/2014, the reference range for this assay has changed to reflect   new instrumentation/methodology.      TROPI  11/10/2014     <0.015  The 99th percentile for upper reference range is 0.045 ug/L.  Troponin values in   the range of 0.045 - 0.120 ug/L may be associated with risks of adverse   clinical events.   Effective 7/30/2014, the reference range for this assay has changed to reflect   new instrumentation/methodology.      TROPONIN 0.02 11/10/2014    TROPONIN 0.00 11/27/2012    TROPONIN <0.04 02/11/2007    TROPONIN <0.04 02/10/2007    TROPONIN <0.04 02/10/2007       BNP  No results for input(s): NTBNPI, NTBNP in the last 168 hours.

## 2017-09-11 NOTE — PROCEDURES
"Procedure  1) LHC  2) LV  3) CAG    Approach LTR   Unable to access RTR  Complications none  Indication old history of NSTEMI, atypical chest pain, abnormal stress echo    Findings    LVEDP 18 mmHg  LVEF 40 to 45% apical aneurysm no MR  LMCA Normal  LAD Smooth. No focal narrowing, however the vessel tapers over the apical segment from 2.25 mm to 1.5 mm. CASSANDRA 3 flow  CX nondominant tortuous and smooth  RCA dominant smooth, normal    Assessment Large apical aneurysm. No focal stenosis that warrants PCI. The vessels are smooth with CASSANDRA 3 flow in all segments.,   The findings suggest the possibility that atherosclerotic CAD may not be the cause of previous MIs. A cardiac MRI might be helpful to look for a \"noninschemic cardiomyopathy\" , but ultimately is unlikely to change in hospital management.       Plan continue ARB BB Statin asa. Will leave issue of cardiac MRI to usual managing cardiologist.     [place]  "

## 2017-09-12 VITALS
WEIGHT: 135.4 LBS | RESPIRATION RATE: 16 BRPM | BODY MASS INDEX: 23.12 KG/M2 | TEMPERATURE: 97.9 F | DIASTOLIC BLOOD PRESSURE: 74 MMHG | HEART RATE: 78 BPM | OXYGEN SATURATION: 97 % | SYSTOLIC BLOOD PRESSURE: 117 MMHG | HEIGHT: 64 IN

## 2017-09-12 LAB — INTERPRETATION ECG - MUSE: NORMAL

## 2017-09-12 PROCEDURE — A9270 NON-COVERED ITEM OR SERVICE: HCPCS | Mod: GY | Performed by: INTERNAL MEDICINE

## 2017-09-12 PROCEDURE — 99239 HOSP IP/OBS DSCHRG MGMT >30: CPT | Performed by: INTERNAL MEDICINE

## 2017-09-12 PROCEDURE — 99232 SBSQ HOSP IP/OBS MODERATE 35: CPT | Performed by: INTERNAL MEDICINE

## 2017-09-12 PROCEDURE — 25000132 ZZH RX MED GY IP 250 OP 250 PS 637: Mod: GY | Performed by: PHYSICIAN ASSISTANT

## 2017-09-12 PROCEDURE — 25000132 ZZH RX MED GY IP 250 OP 250 PS 637: Mod: GY | Performed by: INTERNAL MEDICINE

## 2017-09-12 PROCEDURE — A9270 NON-COVERED ITEM OR SERVICE: HCPCS | Mod: GY | Performed by: PHYSICIAN ASSISTANT

## 2017-09-12 RX ADMIN — LOSARTAN POTASSIUM 25 MG: 25 TABLET ORAL at 09:09

## 2017-09-12 RX ADMIN — METOPROLOL SUCCINATE 25 MG: 25 TABLET, EXTENDED RELEASE ORAL at 09:08

## 2017-09-12 RX ADMIN — ROSUVASTATIN CALCIUM 20 MG: 20 TABLET, FILM COATED ORAL at 09:09

## 2017-09-12 RX ADMIN — OMEPRAZOLE 20 MG: 20 CAPSULE, DELAYED RELEASE ORAL at 07:04

## 2017-09-12 RX ADMIN — ASPIRIN 81 MG: 81 TABLET, COATED ORAL at 09:09

## 2017-09-12 NOTE — PLAN OF CARE
Right upper ext CMS intact. Left wrist hematoma with intact CMS. Discharge instructions reviewed with patient who verbalized understanding. Discharged to home.

## 2017-09-12 NOTE — PROVIDER NOTIFICATION
RN- Paged Dr Rodríguez that patient is not comfortable DCing home today. She has a HA and her L radial site was a bit oozy. L radial site no longer oozing but patient is requesting to stay.

## 2017-09-12 NOTE — PLAN OF CARE
RN- VSS, afeb. Denies CP. Bilat radial approaches in cath lab with TR band X2. Both bands removed with only slight oozing from L radial site. At time of note both TR bands are off and patient has arm boards X 2. Some HA after cath, reports some relief with tylenol and ice pack. Pt did not want to DC home after L radial site oozing and HERNANDEZ. Dr Rodríguez notified that patient does not want to leave. Continue current POC.

## 2017-09-12 NOTE — PLAN OF CARE
Problem: Goal Outcome Summary  Goal: Goal Outcome Summary  Outcome: No Change  VS stable. No complaints of pain. Tele is sinus rhythm. Slept well throughout the night.

## 2017-09-12 NOTE — PROGRESS NOTES
"Cardiology Progress Note  MIQUEL Westbrook          Assessment and Plan:   Admit (9/9) with 1 wk progressive exertional CP relieved with NTG.  Evidence of abnormal STRECO & concern for ACS  PMH:  NSTEMI (07) with PTCA to dLAD  Cath (12) showing occluded dPDA  HTN, hyperlipidemia    CP  -NL troponin/inferior infarct & poor R-wave progression w/ possible anterolat ischemia  -STRECO (9/10)LVEF 50-55% w/ distal ant & apical inf hypokinesia   -previous NSTEMI & PTCA only to dLAD (07) and known occluded dPDA (12)  -(9/11) cath: no significant obstructive CAD.  No PCI  -bblocker/ASA/ARB/statin    Non-ischemic CM  -large apical aneurysm.  LVEF 40-45% (LV gram)  50% (STRECO)  -etiology unclear:  Possibly not r/t CAD  -no significant s&s of CHF  -outpt cMRI recommended to help define  -Home today.  F/u with Dr. Mario    HTN:  -borderline elevated DBP at times    Hyperlipidemia:  -previous intolerance to Crestor & Lipitor  - on Simvastatin & Zetia--> re-trial of Crestor started                Interval History:   Denies any CP/SOB/palpitations                Medications:       rosuvastatin  20 mg Oral Daily     sodium chloride (PF)  3 mL Intracatheter Q8H     aspirin EC  81 mg Oral Daily     sodium chloride (PF)  10 mL Intravenous Once     metoprolol  25 mg Oral Daily     ezetimibe  10 mg Oral QPM     losartan  25 mg Oral Daily     nortriptyline (PAMELOR) capsule 40 mg  40 mg Oral At Bedtime     omeprazole  20 mg Oral Daily            Physical Exam:   Blood pressure 136/74, pulse 78, temperature 97.9  F (36.6  C), temperature source Oral, resp. rate 17, height 1.626 m (5' 4\"), weight 61.4 kg (135 lb 6.4 oz), SpO2 97 %.  Wt Readings from Last 3 Encounters:   09/09/17 61.4 kg (135 lb 6.4 oz)   01/10/17 62.8 kg (138 lb 8 oz)   03/08/16 63.5 kg (140 lb)     I/O last 3 completed shifts:  In: 2641 [P.O.:460; I.V.:2181]  Out: -     CONST:  Alert and oriented  NAD  LUNGS:  CTA bilat  CARDIO:  RRR, S1, S2  No murmurs  ABD:  Soft  " +BS  EXT:  No edema  2+DP bilat           Data:   TELE:  SR with ST elevation lead II    CBC    Recent Labs  Lab 09/09/17  1242   WBC 7.7   HGB 13.1          BMP    Recent Labs  Lab 09/11/17  0644 09/09/17  1242   NA  --  140   POTASSIUM 4.3 4.0   CHLORIDE  --  105   TAMIA  --  8.9   CO2  --  26   BUN  --  11   CR  --  0.74   GLC  --  121*     Recent Labs   Lab Test  09/10/17   0600  01/10/17   0909  06/10/15   0723  12/11/14   0847   CHOL  197  220*  166  162   HDL  59  61  64  55   LDL  110*  143*  87  97   TRIG  142  80  77  50   CHOLHDLRATIO   --    --   2.6  2.9       TROP  Lab Results   Component Value Date    TROPI <0.015 09/09/2017    TROPI <0.015 09/09/2017    TROPI <0.015 09/09/2017    TROPI  11/11/2014     <0.015  The 99th percentile for upper reference range is 0.045 ug/L.  Troponin values in   the range of 0.045 - 0.120 ug/L may be associated with risks of adverse   clinical events.   Effective 7/30/2014, the reference range for this assay has changed to reflect   new instrumentation/methodology.      TROPI  11/10/2014     <0.015  The 99th percentile for upper reference range is 0.045 ug/L.  Troponin values in   the range of 0.045 - 0.120 ug/L may be associated with risks of adverse   clinical events.   Effective 7/30/2014, the reference range for this assay has changed to reflect   new instrumentation/methodology.      TROPONIN 0.02 11/10/2014    TROPONIN 0.00 11/27/2012    TROPONIN <0.04 02/11/2007    TROPONIN <0.04 02/10/2007    TROPONIN <0.04 02/10/2007       BNP  No results for input(s): NTBNPI, NTBNP in the last 168 hours.

## 2017-09-13 ENCOUNTER — CARE COORDINATION (OUTPATIENT)
Dept: CARDIOLOGY | Facility: CLINIC | Age: 67
End: 2017-09-13

## 2017-09-13 NOTE — PROGRESS NOTES
Called patient to discuss any post hospital d/c questions she may have, review medication changes, and confirm f/u appts. Patient denied any questions regarding new medications or changes to some of her current medications that she was taking prior to admission. Patient denied any SOB, chest pain, or light headedness. RN confirmed with patient that we would like her to schedule a cardiac MRI and f/u apt with Dr. Mario. Patient advised to call clinic with any cardiac related questions or concerns. Patient verbalized understanding and agreed with plan. RN transferred patient to scheduling to schedule her Cardiac MRI and f/u apt with Dr. Mario.
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
## 2017-09-15 ENCOUNTER — HOSPITAL ENCOUNTER (OUTPATIENT)
Dept: CARDIOLOGY | Facility: CLINIC | Age: 67
Discharge: HOME | End: 2017-09-15
Attending: NURSE PRACTITIONER | Admitting: NURSE PRACTITIONER
Payer: MEDICARE

## 2017-09-15 ENCOUNTER — TELEPHONE (OUTPATIENT)
Dept: CARDIOLOGY | Facility: CLINIC | Age: 67
End: 2017-09-15

## 2017-09-15 DIAGNOSIS — I42.9 CARDIOMYOPATHY, UNSPECIFIED (H): ICD-10-CM

## 2017-09-15 PROCEDURE — 25000128 H RX IP 250 OP 636: Performed by: NURSE PRACTITIONER

## 2017-09-15 PROCEDURE — 75561 CARDIAC MRI FOR MORPH W/DYE: CPT

## 2017-09-15 PROCEDURE — A9585 GADOBUTROL INJECTION: HCPCS | Performed by: NURSE PRACTITIONER

## 2017-09-15 PROCEDURE — 75561 CARDIAC MRI FOR MORPH W/DYE: CPT | Mod: 26 | Performed by: INTERNAL MEDICINE

## 2017-09-15 PROCEDURE — A9270 NON-COVERED ITEM OR SERVICE: HCPCS | Mod: GY | Performed by: INTERNAL MEDICINE

## 2017-09-15 PROCEDURE — 25000132 ZZH RX MED GY IP 250 OP 250 PS 637: Mod: GY | Performed by: INTERNAL MEDICINE

## 2017-09-15 RX ORDER — ACYCLOVIR 200 MG/1
0-1 CAPSULE ORAL
Status: DISCONTINUED | OUTPATIENT
Start: 2017-09-15 | End: 2017-09-16 | Stop reason: HOSPADM

## 2017-09-15 RX ORDER — DIAZEPAM 5 MG
5 TABLET ORAL EVERY 30 MIN PRN
Status: DISCONTINUED | OUTPATIENT
Start: 2017-09-15 | End: 2017-09-16 | Stop reason: HOSPADM

## 2017-09-15 RX ORDER — GADOBUTROL 604.72 MG/ML
5-65 INJECTION INTRAVENOUS ONCE
Status: COMPLETED | OUTPATIENT
Start: 2017-09-15 | End: 2017-09-15

## 2017-09-15 RX ADMIN — GADOBUTROL 9 ML: 604.72 INJECTION INTRAVENOUS at 16:40

## 2017-09-15 RX ADMIN — DIAZEPAM 5 MG: 5 TABLET ORAL at 16:42

## 2017-09-15 NOTE — TELEPHONE ENCOUNTER
Patient in clinic for MRI. Wanted to know if Dr. Mario is ok if she takes 22004 mg of Biotin for hair loss. If this dose is too high, is there a dose she can take.  Will route to team 6 and Dr. Mario

## 2017-09-25 ENCOUNTER — APPOINTMENT (OUTPATIENT)
Dept: GENERAL RADIOLOGY | Facility: CLINIC | Age: 67
End: 2017-09-25
Attending: EMERGENCY MEDICINE
Payer: MEDICARE

## 2017-09-25 ENCOUNTER — HOSPITAL ENCOUNTER (EMERGENCY)
Facility: CLINIC | Age: 67
Discharge: HOME | End: 2017-09-25
Attending: EMERGENCY MEDICINE | Admitting: EMERGENCY MEDICINE
Payer: MEDICARE

## 2017-09-25 VITALS
OXYGEN SATURATION: 100 % | DIASTOLIC BLOOD PRESSURE: 58 MMHG | RESPIRATION RATE: 16 BRPM | TEMPERATURE: 98.6 F | SYSTOLIC BLOOD PRESSURE: 96 MMHG

## 2017-09-25 DIAGNOSIS — M62.81 GENERALIZED MUSCLE WEAKNESS: ICD-10-CM

## 2017-09-25 DIAGNOSIS — R06.02 SHORTNESS OF BREATH: ICD-10-CM

## 2017-09-25 DIAGNOSIS — M54.6 ACUTE MIDLINE THORACIC BACK PAIN: ICD-10-CM

## 2017-09-25 LAB
ALBUMIN UR-MCNC: NEGATIVE MG/DL
ANION GAP SERPL CALCULATED.3IONS-SCNC: 7 MMOL/L (ref 3–14)
APPEARANCE UR: CLEAR
BASOPHILS # BLD AUTO: 0 10E9/L (ref 0–0.2)
BASOPHILS NFR BLD AUTO: 0.5 %
BILIRUB UR QL STRIP: NEGATIVE
BUN SERPL-MCNC: 13 MG/DL (ref 7–30)
CALCIUM SERPL-MCNC: 8.8 MG/DL (ref 8.5–10.1)
CHLORIDE SERPL-SCNC: 105 MMOL/L (ref 94–109)
CO2 SERPL-SCNC: 29 MMOL/L (ref 20–32)
COLOR UR AUTO: YELLOW
CREAT SERPL-MCNC: 0.7 MG/DL (ref 0.52–1.04)
DIFFERENTIAL METHOD BLD: NORMAL
EOSINOPHIL # BLD AUTO: 0.2 10E9/L (ref 0–0.7)
EOSINOPHIL NFR BLD AUTO: 4 %
ERYTHROCYTE [DISTWIDTH] IN BLOOD BY AUTOMATED COUNT: 12.3 % (ref 10–15)
GFR SERPL CREATININE-BSD FRML MDRD: 84 ML/MIN/1.7M2
GLUCOSE SERPL-MCNC: 144 MG/DL (ref 70–99)
GLUCOSE UR STRIP-MCNC: 150 MG/DL
GRAN CASTS #/AREA URNS LPF: 4 /LPF
HCT VFR BLD AUTO: 35.1 % (ref 35–47)
HGB BLD-MCNC: 11.7 G/DL (ref 11.7–15.7)
HGB UR QL STRIP: ABNORMAL
HYALINE CASTS #/AREA URNS LPF: 1 /LPF (ref 0–2)
IMM GRANULOCYTES # BLD: 0 10E9/L (ref 0–0.4)
IMM GRANULOCYTES NFR BLD: 0.4 %
INR PPP: 1.01 (ref 0.86–1.14)
KETONES UR STRIP-MCNC: NEGATIVE MG/DL
LACTATE BLD-SCNC: 1.8 MMOL/L (ref 0.7–2)
LEUKOCYTE ESTERASE UR QL STRIP: NEGATIVE
LYMPHOCYTES # BLD AUTO: 1.9 10E9/L (ref 0.8–5.3)
LYMPHOCYTES NFR BLD AUTO: 33.9 %
MAGNESIUM SERPL-MCNC: 2.2 MG/DL (ref 1.6–2.3)
MCH RBC QN AUTO: 29.5 PG (ref 26.5–33)
MCHC RBC AUTO-ENTMCNC: 33.3 G/DL (ref 31.5–36.5)
MCV RBC AUTO: 88 FL (ref 78–100)
MONOCYTES # BLD AUTO: 0.4 10E9/L (ref 0–1.3)
MONOCYTES NFR BLD AUTO: 7 %
MUCOUS THREADS #/AREA URNS LPF: PRESENT /LPF
NEUTROPHILS # BLD AUTO: 3 10E9/L (ref 1.6–8.3)
NEUTROPHILS NFR BLD AUTO: 54.2 %
NITRATE UR QL: NEGATIVE
NRBC # BLD AUTO: 0 10*3/UL
NRBC BLD AUTO-RTO: 0 /100
PH UR STRIP: 6 PH (ref 5–7)
PLATELET # BLD AUTO: 283 10E9/L (ref 150–450)
PLATELET # BLD EST: NORMAL 10*3/UL
POTASSIUM SERPL-SCNC: 3.6 MMOL/L (ref 3.4–5.3)
RBC # BLD AUTO: 3.97 10E12/L (ref 3.8–5.2)
RBC #/AREA URNS AUTO: 4 /HPF (ref 0–2)
RBC MORPH BLD: NORMAL
SODIUM SERPL-SCNC: 141 MMOL/L (ref 133–144)
SOURCE: ABNORMAL
SP GR UR STRIP: 1.01 (ref 1–1.03)
TROPONIN I BLD-MCNC: 0 UG/L (ref 0–0.1)
TSH SERPL DL<=0.005 MIU/L-ACNC: 1.46 MU/L (ref 0.4–4)
UROBILINOGEN UR STRIP-MCNC: 0 MG/DL (ref 0–2)
WBC # BLD AUTO: 5.6 10E9/L (ref 4–11)
WBC #/AREA URNS AUTO: <1 /HPF (ref 0–2)

## 2017-09-25 PROCEDURE — 81001 URINALYSIS AUTO W/SCOPE: CPT | Performed by: EMERGENCY MEDICINE

## 2017-09-25 PROCEDURE — 96361 HYDRATE IV INFUSION ADD-ON: CPT

## 2017-09-25 PROCEDURE — 93005 ELECTROCARDIOGRAM TRACING: CPT

## 2017-09-25 PROCEDURE — 87040 BLOOD CULTURE FOR BACTERIA: CPT | Mod: 91 | Performed by: EMERGENCY MEDICINE

## 2017-09-25 PROCEDURE — 84484 ASSAY OF TROPONIN QUANT: CPT

## 2017-09-25 PROCEDURE — 99285 EMERGENCY DEPT VISIT HI MDM: CPT | Mod: 25

## 2017-09-25 PROCEDURE — 71020 XR CHEST 2 VW: CPT

## 2017-09-25 PROCEDURE — 84443 ASSAY THYROID STIM HORMONE: CPT | Performed by: EMERGENCY MEDICINE

## 2017-09-25 PROCEDURE — 83735 ASSAY OF MAGNESIUM: CPT | Performed by: EMERGENCY MEDICINE

## 2017-09-25 PROCEDURE — 85025 COMPLETE CBC W/AUTO DIFF WBC: CPT | Performed by: EMERGENCY MEDICINE

## 2017-09-25 PROCEDURE — 96360 HYDRATION IV INFUSION INIT: CPT

## 2017-09-25 PROCEDURE — 83605 ASSAY OF LACTIC ACID: CPT | Performed by: EMERGENCY MEDICINE

## 2017-09-25 PROCEDURE — 80048 BASIC METABOLIC PNL TOTAL CA: CPT | Performed by: EMERGENCY MEDICINE

## 2017-09-25 PROCEDURE — 85610 PROTHROMBIN TIME: CPT | Performed by: EMERGENCY MEDICINE

## 2017-09-25 PROCEDURE — 25000128 H RX IP 250 OP 636: Performed by: EMERGENCY MEDICINE

## 2017-09-25 PROCEDURE — 36415 COLL VENOUS BLD VENIPUNCTURE: CPT | Performed by: EMERGENCY MEDICINE

## 2017-09-25 RX ORDER — LIDOCAINE 40 MG/G
CREAM TOPICAL
Status: DISCONTINUED | OUTPATIENT
Start: 2017-09-25 | End: 2017-09-25 | Stop reason: HOSPADM

## 2017-09-25 RX ORDER — SODIUM CHLORIDE 9 MG/ML
1000 INJECTION, SOLUTION INTRAVENOUS CONTINUOUS
Status: DISCONTINUED | OUTPATIENT
Start: 2017-09-25 | End: 2017-09-25 | Stop reason: HOSPADM

## 2017-09-25 RX ADMIN — SODIUM CHLORIDE 1000 ML: 9 INJECTION, SOLUTION INTRAVENOUS at 15:19

## 2017-09-25 ASSESSMENT — ENCOUNTER SYMPTOMS
VOMITING: 0
COUGH: 1
WEAKNESS: 1
CONSTIPATION: 0
FREQUENCY: 0
DIARRHEA: 0
LIGHT-HEADEDNESS: 0
DIFFICULTY URINATING: 0
FATIGUE: 1
BACK PAIN: 1
DIZZINESS: 0
SHORTNESS OF BREATH: 1
NAUSEA: 0

## 2017-09-25 NOTE — ED AVS SNAPSHOT
[facility] Emergency Department    [address]    [facility] [address]    Phone:  [phone]    Fax:  [phone]                                       Ania Castillo   MRN: [id number]    Department:  [facility] Emergency Department   Date of Visit:  9/25/2017           After Visit Summary Signature Page     I have received my discharge instructions, and my questions have been answered. I have discussed any challenges I see with this plan with the nurse or doctor.    ..........................................................................................................................................  Patient/Patient Representative Signature      ..........................................................................................................................................  Patient Representative Print Name and Relationship to Patient    ..................................................               ................................................  Date                                            Time    ..........................................................................................................................................  Reviewed by Signature/Title    ...................................................              ..............................................  Date                                                            Time

## 2017-09-25 NOTE — TELEPHONE ENCOUNTER
Pt calling in 1. Wants cardiac MRI results- in epic they say still in process. Did leave message for Lori Arevalo if she can see what is happening with tests. 2. Pt having pain between shoulders and SOB. Told her she needs to go to ER then.  3. Waiting for answer on Biotin increase, did message DR Mario.  DESIREE Gaxiola RN

## 2017-09-25 NOTE — ED NOTES
"Discharged from the hospital on Wednesday. Back today because she notes she has \"no strength at all\". Also notes upper back pain when she gets up to do anything for the past week. Patient also notes she has had shortness of breath. When in the hospital she had a stress test. Also had a recent cardiac MRI which she does not have results for. States she does have a MI history.   "
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
## 2017-09-25 NOTE — ED AVS SNAPSHOT
[facility] Emergency Department    [address] [place] [address]    Phone:  [phone]    Fax:  [phone]                                       Ania Castillo   MRN: [id number]    Department:  [facility] Emergency Department   Date of Visit:  9/25/2017           Patient Information     Date Of Birth          1950        Your diagnoses for this visit were:     Generalized muscle weakness     Shortness of breath     Acute midline thoracic back pain        You were seen by Chase Moore MD.      Follow-up Information     Follow up with Ana Smith MD. Schedule an appointment as soon as possible for a visit in 3 days.    Specialty:  Family Practice    Contact information:    [facility]  [id number]   [place] [address]  [phone]          Discharge Instructions         Discharge Instructions  Back Pain  You were seen today for back pain. Back pain can have many causes, but most will get better without surgery or other specific treatment. Sometimes there is a herniated ( slipped ) disc. We don t usually do MRI scans to look for these right away, since most herniated discs will get better on their own with time.  Today, we did not find any evidence that your back pain was caused by a serious condition, such as an infection, fracture, or tumor. However, sometimes symptoms develop over time and cannot be found during an emergency visit, so it is very important that you follow up with your primary doctor.  Return to the Emergency Department if:    You develop a fever with your back pain.     You have weakness or change in sensation in one or both legs.    You lose control of your bowels or bladder, or can t empty your bladder.    Your pain gets much worse.     Follow-up with your doctor:    Unless your pain has completely gone away, please make an appointment with your doctor within one week.  You may need further management of your  back pain, such as more pain medication, imaging such as an X-ray or MRI, or physical therapy.    What can I do to help myself?    Remain Active -- People are often afraid that they will hurt their back further or delay recovery by remaining active, but this is one of the best things you can do for your back. In fact, prolonged bed rest is not recommended. Studies have shown that people with low back pain recover faster when they remain active. Movement helps to bring blood flow to the muscles and relieve muscle spasms as well as preventing loss of muscle strength.    Heat -- Using a heating pad can help with low back pain during the first few weeks. Do not sleep with a heating pad, as you can be burned.     Pain medications - You may take a pain medication such as Tylenol  (acetaminophen), Advil , Nuprin  (ibuprofen) or Aleve  (naproxen).  If you have been given a narcotic such as Vicodin  (hydrocodone with acetaminophen), Percocet  (oxycodone with acetaminophen), codeine, or a muscle relaxant such as Flexeril  (cyclobenzaprine) or Soma  (carisoprodol), do not drive for four hours after you have taken it. If the narcotic contains Tylenol  (acetaminophen), do not take Tylenol  with it. All narcotics will cause constipation, so eat a high fiber diet.   If you were given a prescription for medicine here today, be sure to read all of the information (including the package insert) that comes with your prescription.  This will include important information about the medicine, its side effects, and any warnings that you need to know about.  The pharmacist who fills the prescription can provide more information and answer questions you may have about the medicine.  If you have questions or concerns that the pharmacist cannot address, please call or return to the Emergency Department.   Opioid Medication Information    Pain medications are among the most commonly prescribed medicines, so we are including this information  for all our patients. If you did not receive pain medication or get a prescription for pain medicine, you can ignore it.     You may have been given a prescription for an opioid (narcotic) pain medicine and/or have received a pain medicine while here in the Emergency Department. These medicines can make you drowsy or impaired. You must not drive, operate dangerous equipment, or engage in any other dangerous activities while taking these medications. If you drive while taking these medications, you could be arrested for DUI, or driving under the influence. Do not drink any alcohol while you are taking these medications.     Opioid pain medications can cause addiction. If you have a history of chemical dependency of any type, you are at a higher risk of becoming addicted to pain medications.  Only take these prescribed medications to treat your pain when all other options have been tried. Take it for as short a time and as few doses as possible. Store your pain pills in a secure place, as they are frequently stolen and provide a dangerous opportunity for children or visitors in your house to start abusing these powerful medications. We will not replace any lost or stolen medicine.  As soon as your pain is better, you should flush all your remaining medication.     Many prescription pain medications contain Tylenol  (acetaminophen), including Vicodin , Tylenol #3 , Norco , Lortab , and Percocet .  You should not take any extra pills of Tylenol  if you are using these prescription medications or you can get very sick.  Do not ever take more than 3000 mg of acetaminophen in any 24 hour period.    All opioids tend to cause constipation. Drink plenty of water and eat foods that have a lot of fiber, such as fruits, vegetables, prune juice, apple juice and high fiber cereal.  Take a laxative if you don t move your bowels at least every other day. Miralax , Milk of Magnesia, Colace , or Senna  can be used to keep you regular.       Remember that you can always come back to the Emergency Department if you are not able to see your regular doctor in the amount of time listed above, if you get any new symptoms, or if there is anything that worries you.        Discharge References/Attachments     WEAKNESS (UNCERTAIN CAUSE) (ENGLISH)      Future Appointments        Provider Department Dept Phone Center    10/2/2017 10:15 AM August Mario MD [facility] [place] [phone] [facility]      24 Hour Appointment Hotline       To make an appointment at any [facility] clinic, call [phone] ([phone]). If you don't have a family doctor or clinic, we will help you find one. [facility] are conveniently located to serve the needs of you and your family.             Review of your medicines      Our records show that you are taking the medicines listed below. If these are incorrect, please call your family doctor or clinic.        Dose / Directions Last dose taken    acetaminophen 500 MG tablet   Commonly known as:  TYLENOL   Dose:  1000 mg        Take 1,000 mg by mouth every 6 hours as needed   Refills:  0        aspirin 81 MG EC tablet   Dose:  81 mg        Take 81 mg by mouth daily.   Refills:  0        BIOTIN PO   Dose:  4 tablet        Take 4 tablets by mouth daily   Refills:  0        ezetimibe 10 MG tablet   Commonly known as:  ZETIA   Dose:  10 mg   Quantity:  90 tablet        Take 1 tablet (10 mg) by mouth daily   Refills:  3        fish oil-omega-3 fatty acids 1000 MG capsule   Dose:  1 g        Take 1 g by mouth daily.   Refills:  0        losartan 25 MG tablet   Commonly known as:  COZAAR   Dose:  25 mg   Quantity:  90 tablet        Take 1 tablet (25 mg) by mouth daily   Refills:  3        metoprolol 25 MG 24 hr tablet   Commonly known as:  TOPROL-XL   Dose:  25 mg   Quantity:  90 tablet        Take 1 tablet (25 mg) by mouth daily   Refills:  3        nitroGLYcerin 0.4 MG sublingual  tablet   Commonly known as:  NITROSTAT   Dose:  0.4 mg   Quantity:  25 tablet        Place 1 tablet (0.4 mg) under the tongue every 5 minutes as needed   Refills:  0        NORTRIPTYLINE HCL PO   Dose:  40 mg        Take 40 mg by mouth At Bedtime   Refills:  0        priLOSEC OTC 20 MG tablet   Dose:  20 mg   Generic drug:  omeprazole        Take 20 mg by mouth daily   Refills:  0        simvastatin 40 MG tablet   Commonly known as:  ZOCOR   Dose:  40 mg   Quantity:  90 tablet        Take 1 tablet (40 mg) by mouth At Bedtime   Refills:  3                Procedures and tests performed during your visit     Basic metabolic panel    Blood culture    CBC with platelets differential    EKG 12 lead    EKG 12-lead, tracing only    INR    ISTAT troponin nursing POCT    Lactic acid whole blood    Magnesium    Peripheral IV catheter    Pulse oximetry nursing    TSH    Troponin POCT    UA with Microscopic    Vital signs    XR Chest 2 Views      Orders Needing Specimen Collection     None      Pending Results     Date and Time Order Name Status Description    9/25/2017 1459 XR Chest 2 Views Preliminary             Pending Culture Results     No orders found from 9/23/2017 to 9/26/2017.            Pending Results Instructions     If you had any lab results that were not finalized at the time of your Discharge, you can call the ED Lab Result RN at [phone]. You will be contacted by this team for any positive Lab results or changes in treatment. The nurses are available 7 days a week from 10A to 6:30P.  You can leave a message 24 hours per day and they will return your call.        Test Results From Your Hospital Stay        9/25/2017  4:29 PM      Component Results     Component Value Ref Range & Units Status    WBC 5.6 4.0 - 11.0 10e9/L Final    RBC Count 3.97 3.8 - 5.2 10e12/L Final    Hemoglobin 11.7 11.7 - 15.7 g/dL Final    Hematocrit 35.1 35.0 - 47.0 % Final    MCV 88 78 - 100 fl Final    MCH 29.5 26.5 - 33.0 pg Final     MCHC 33.3 31.5 - 36.5 g/dL Final    RDW 12.3 10.0 - 15.0 % Final    Platelet Count 283 150 - 450 10e9/L Final    Diff Method Automated Method  Final    % Neutrophils 54.2 % Final    % Lymphocytes 33.9 % Final    % Monocytes 7.0 % Final    % Eosinophils 4.0 % Final    % Basophils 0.5 % Final    % Immature Granulocytes 0.4 % Final    Nucleated RBCs 0 0 /100 Final    Absolute Neutrophil 3.0 1.6 - 8.3 10e9/L Final    Absolute Lymphocytes 1.9 0.8 - 5.3 10e9/L Final    Absolute Monocytes 0.4 0.0 - 1.3 10e9/L Final    Absolute Eosinophils 0.2 0.0 - 0.7 10e9/L Final    Absolute Basophils 0.0 0.0 - 0.2 10e9/L Final    Abs Immature Granulocytes 0.0 0 - 0.4 10e9/L Final    Absolute Nucleated RBC 0.0  Final    RBC Morphology   Final    Consistent with reported results    Platelet Estimate Normal  Final         9/25/2017  4:00 PM      Component Results     Component Value Ref Range & Units Status    INR 1.01 0.86 - 1.14 Final         9/25/2017  4:18 PM      Component Results     Component Value Ref Range & Units Status    Sodium 141 133 - 144 mmol/L Final    Potassium 3.6 3.4 - 5.3 mmol/L Final    Chloride 105 94 - 109 mmol/L Final    Carbon Dioxide 29 20 - 32 mmol/L Final    Anion Gap 7 3 - 14 mmol/L Final    Glucose 144 (H) 70 - 99 mg/dL Final    Urea Nitrogen 13 7 - 30 mg/dL Final    Creatinine 0.70 0.52 - 1.04 mg/dL Final    GFR Estimate 84 >60 mL/min/1.7m2 Final    Non  GFR Calc    GFR Estimate If Black >90 >60 mL/min/1.7m2 Final    African American GFR Calc    Calcium 8.8 8.5 - 10.1 mg/dL Final         9/25/2017  4:18 PM      Component Results     Component Value Ref Range & Units Status    Magnesium 2.2 1.6 - 2.3 mg/dL Final         9/25/2017  3:55 PM      Component Results     Component Value Ref Range & Units Status    Lactic Acid 1.8 0.7 - 2.0 mmol/L Final         9/25/2017  4:23 PM      Component Results     Component Value Ref Range & Units Status    TSH 1.46 0.40 - 4.00 mU/L Final          9/25/2017  6:08 PM      Component Results     Component Value Ref Range & Units Status    Color Urine Yellow  Final    Appearance Urine Clear  Final    Glucose Urine 150 (A) NEG^Negative mg/dL Final    Bilirubin Urine Negative NEG^Negative Final    Ketones Urine Negative NEG^Negative mg/dL Final    Specific Gravity Urine 1.014 1.003 - 1.035 Final    Blood Urine Small (A) NEG^Negative Final    pH Urine 6.0 5.0 - 7.0 pH Final    Protein Albumin Urine Negative NEG^Negative mg/dL Final    Urobilinogen mg/dL 0.0 0.0 - 2.0 mg/dL Final    Nitrite Urine Negative NEG^Negative Final    Leukocyte Esterase Urine Negative NEG^Negative Final    Source Midstream Urine  Final    WBC Urine <1 0 - 2 /HPF Final    RBC Urine 4 (H) 0 - 2 /HPF Final    Mucous Urine Present (A) NEG^Negative /LPF Final    Hyaline Casts 1 0 - 2 /LPF Final    Granular Casts 4 (A) NEG^Negative /LPF Final         9/25/2017  5:56 PM      Narrative     CHEST TWO VIEWS  9/25/2017 5:49 PM     COMPARISON: Two view chest x-ray 9/9/2017.    HISTORY: Shortness of breath.      FINDINGS: The cardiac silhouette, pulmonary vasculature, lungs and  pleural spaces are within normal limits.        Impression     IMPRESSION: Clear lungs.         9/25/2017  3:31 PM      Component Results     Component Value Ref Range & Units Status    Troponin I 0.00 0.00 - 0.10 ug/L Final                Clinical Quality Measure: Blood Pressure Screening     Your blood pressure was checked while you were in the emergency department today. The last reading we obtained was  BP: 116/80 . Please read the guidelines below about what these numbers mean and what you should do about them.  If your systolic blood pressure (the top number) is less than 120 and your diastolic blood pressure (the bottom number) is less than 80, then your blood pressure is normal. There is nothing more that you need to do about it.  If your systolic blood pressure (the top number) is 120-139 or your diastolic blood  "pressure (the bottom number) is 80-89, your blood pressure may be higher than it should be. You should have your blood pressure rechecked within a year by a primary care provider.  If your systolic blood pressure (the top number) is 140 or greater or your diastolic blood pressure (the bottom number) is 90 or greater, you may have high blood pressure. High blood pressure is treatable, but if left untreated over time it can put you at risk for heart attack, stroke, or kidney failure. You should have your blood pressure rechecked by a primary care provider within the next 4 weeks.  If your provider in the emergency department today gave you specific instructions to follow-up with your doctor or provider even sooner than that, you should follow that instruction and not wait for up to 4 weeks for your follow-up visit.        Thank you for choosing [facility]       Thank you for choosing [facility] for your care. Our goal is always to provide you with excellent care. Hearing back from our patients is one way we can continue to improve our services. Please take a few minutes to complete the written survey that you may receive in the mail after you visit with us. Thank you!        Mobile Authentication Information     Mobile Authentication lets you send messages to your doctor, view your test results, renew your prescriptions, schedule appointments and more. To sign up, go to [email or web address] . Click on \"Log in\" on the left side of the screen, which will take you to the Welcome page. Then click on \"Sign up Now\" on the right side of the page.     You will be asked to enter the access code listed below, as well as some personal information. Please follow the directions to create your username and password.     Your access code is: [email or web address]  Expires: 12/10/2017  7:16 PM     Your access code will  in 90 days. If you need help or a new code, please call your [facility] clinic or [phone].        Care EveryWhere ID     This is your Care " EveryWhere ID. This could be used by other organizations to access your [facility] medical records  [id number]        Equal Access to Services     BRITTANY ADAN : Natividad Dominguez, lior martin, aura herbert, ro hernández. So [facility] [phone].    ATENCIÓN: Si habla español, tiene a pedraza disposición servicios gratuitos de asistencia lingüística. Llame al [phone].    We comply with applicable federal civil rights laws and [place] laws. We do not discriminate on the basis of race, color, national origin, age, disability sex, sexual orientation or gender identity.            After Visit Summary       This is your record. Keep this with you and show to your community pharmacist(s) and doctor(s) at your next visit.

## 2017-09-26 LAB — INTERPRETATION ECG - MUSE: NORMAL

## 2017-09-27 ENCOUNTER — TELEPHONE (OUTPATIENT)
Dept: CARDIOLOGY | Facility: CLINIC | Age: 67
End: 2017-09-27

## 2017-09-27 NOTE — TELEPHONE ENCOUNTER
I called patient. Patient is doing okay. She plans on seeing Dr. Mario on 10-02-17.     Called [facility] in [facility].     Phone: [phone]   Patient had a cath on 12-29-06. Requested films from their radiology department. Their medical record department is located outside of the hospital and they only have paper reports.      TGarbersRN

## 2017-09-27 NOTE — TELEPHONE ENCOUNTER
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 It is possible that it is not atherosclerotic and could be SCAD that was not considered in differential in the past. Could we get angio films from [place] from her first cath and she can see me in next 1-2 weeks.

## 2017-09-27 NOTE — TELEPHONE ENCOUNTER
Received cMRI results today (done on 9/15/17)  Pls review  Dr. Downey recommended pt see you next week in regards to recent hospitalization for NSTEMI w/ RWMA and normal coronaries per cath (9/9/17)  Pls review cMRI  I will have RN call for pt's status update today Thanks, DOUG

## 2017-09-28 NOTE — TELEPHONE ENCOUNTER
Received a call from [facility]. They requested I call Nakia at [phone]. Nakia requested we fax a signed RUSH to [phone]. We need to put that we are requesting the films from patient's 12-29-06 Cath and that patient's appt is on 10-02-17. She will send the films via [employer], so we can get the films before her appt.     Reviewed this with Betsy YIP. Patient will be coming to the [place] office to sign the RUSH.     TGarbers RN  [facility]

## 2017-10-01 LAB
BACTERIA SPEC CULT: NO GROWTH
BACTERIA SPEC CULT: NO GROWTH
Lab: NORMAL
Lab: NORMAL
SPECIMEN SOURCE: NORMAL
SPECIMEN SOURCE: NORMAL

## 2017-10-02 ENCOUNTER — OFFICE VISIT (OUTPATIENT)
Dept: CARDIOLOGY | Facility: CLINIC | Age: 67
End: 2017-10-02
Attending: INTERNAL MEDICINE
Payer: MEDICARE

## 2017-10-02 VITALS
WEIGHT: 137 LBS | SYSTOLIC BLOOD PRESSURE: 110 MMHG | BODY MASS INDEX: 23.39 KG/M2 | DIASTOLIC BLOOD PRESSURE: 70 MMHG | HEIGHT: 64 IN | HEART RATE: 88 BPM

## 2017-10-02 DIAGNOSIS — I25.42 SPONTANEOUS DISSECTION OF CORONARY ARTERY: ICD-10-CM

## 2017-10-02 DIAGNOSIS — I20.1 CORONARY ARTERY SPASM (H): ICD-10-CM

## 2017-10-02 DIAGNOSIS — I25.2 H/O ACUTE MYOCARDIAL INFARCTION OF ANTERIOR WALL: Primary | ICD-10-CM

## 2017-10-02 PROCEDURE — 99214 OFFICE O/P EST MOD 30 MIN: CPT | Performed by: INTERNAL MEDICINE

## 2017-10-02 RX ORDER — NORTRIPTYLINE HCL 10 MG
40 CAPSULE ORAL AT BEDTIME
Qty: 30 CAPSULE | Refills: 11 | Status: CANCELLED | OUTPATIENT
Start: 2017-10-02

## 2017-10-02 RX ORDER — OMEPRAZOLE 20 MG/1
20 TABLET, DELAYED RELEASE ORAL DAILY
Qty: 30 TABLET | Refills: 11 | Status: CANCELLED | OUTPATIENT
Start: 2017-10-02
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
## 2017-10-02 NOTE — MR AVS SNAPSHOT
"              After Visit Summary   10/2/2017    Ania Castillo    MRN: [id number]           Patient Information     Date Of Birth          1950        Visit Information        Provider Department      10/2/2017 10:15 AM August Mario MD [facility] [place]        Today's Diagnoses     H/O acute myocardial infarction of anterior wall    -  1    Coronary artery spasm (H)        Spontaneous dissection of coronary artery           Follow-ups after your visit        Additional Services     Follow-Up with Cardiologist                 Future tests that were ordered for you today     Open Future Orders        Priority Expected Expires Ordered    Lipid Profile Routine 3/31/2018 10/2/2018 10/2/2017    Follow-Up with Cardiologist Routine 3/31/2018 10/2/2018 10/2/2017            Who to contact     If you have questions or need follow up information about today's clinic visit or your schedule please contact [facility] [place] directly at [phone].  Normal or non-critical lab and imaging results will be communicated to you by MyChart, letter or phone within 4 business days after the clinic has received the results. If you do not hear from us within 7 days, please contact the clinic through Vee[employer] or phone. If you have a critical or abnormal lab result, we will notify you by phone as soon as possible.  Submit refill requests through Knowable or call your pharmacy and they will forward the refill request to us. Please allow 3 business days for your refill to be completed.          Additional Information About Your Visit        [facility]ee[employer] Information     Knowable lets you send messages to your doctor, view your test results, renew your prescriptions, schedule appointments and more. To sign up, go to [email or web address] . Click on \"Log in\" on the left side of the screen, which will take you to the Welcome page. Then click on \"Sign up Now\" on the right " "side of the page.     You will be asked to enter the access code listed below, as well as some personal information. Please follow the directions to create your username and password.     Your access code is: [email or web address]  Expires: 12/10/2017  7:16 PM     Your access code will  in 90 days. If you need help or a new code, please call your [place] clinic or [phone].        Care EveryWhere ID     This is your Care EveryWhere ID. This could be used by other organizations to access your [place] medical records  [id number]        Your Vitals Were     Pulse Height BMI (Body Mass Index)             88 1.626 m (5' 4\") 23.52 kg/m2          Blood Pressure from Last 3 Encounters:   10/02/17 110/70   17 96/58   17 117/74    Weight from Last 3 Encounters:   10/02/17 62.1 kg (137 lb)   17 61.4 kg (135 lb 6.4 oz)   01/10/17 62.8 kg (138 lb 8 oz)              We Performed the Following     Follow-Up with Cardiologist        Primary Care Provider Office Phone # Fax #    Ana Smith [phone]       [facility] [address] [place] [address] [place] [address]        Equal Access to Services     BRITTANY ADAN : Hadii aad ku hadasho Soomaali, waaxda luqadaha, qaybta kaalmada adeegyada, ro hernández. So [facility] [place] [phone].    ATENCIÓN: Si habla español, tiene a pedraza disposición servicios gratuitos de asistencia lingüística. Llame al [phone].    We comply with applicable federal civil rights laws and [place] laws. We do not discriminate on the basis of race, color, national origin, age, disability, sex, sexual orientation, or gender identity.            Thank you!     Thank you for choosing [facility] [place]  for your care. Our goal is always to provide you with excellent care. Hearing back from our patients is one way we can continue to improve our services. Please take a few minutes to complete the written survey " that you may receive in the mail after your visit with us. Thank you!             Your Updated Medication List - Protect others around you: Learn how to safely use, store and throw away your medicines at www.disposemymeds.org.          This list is accurate as of: 10/2/17 10:57 AM.  Always use your most recent med list.                   Brand Name Dispense Instructions for use Diagnosis    acetaminophen 500 MG tablet    TYLENOL     Take 1,000 mg by mouth every 6 hours as needed        aspirin 81 MG EC tablet      Take 81 mg by mouth daily.        BIOTIN PO      Take 4 tablets by mouth daily        ezetimibe 10 MG tablet    ZETIA    90 tablet    Take 1 tablet (10 mg) by mouth daily    Coronary artery disease involving native coronary artery of native heart without angina pectoris       fish oil-omega-3 fatty acids 1000 MG capsule      Take 1 g by mouth daily.        losartan 25 MG tablet    COZAAR    90 tablet    Take 1 tablet (25 mg) by mouth daily    Coronary artery disease involving native coronary artery of native heart without angina pectoris       metoprolol 25 MG 24 hr tablet    TOPROL-XL    90 tablet    Take 1 tablet (25 mg) by mouth daily    Coronary artery disease involving native coronary artery of native heart without angina pectoris       nitroGLYcerin 0.4 MG sublingual tablet    NITROSTAT    25 tablet    Place 1 tablet (0.4 mg) under the tongue every 5 minutes as needed    Coronary artery disease involving native coronary artery of native heart without angina pectoris       NORTRIPTYLINE HCL PO      Take 40 mg by mouth At Bedtime        priLOSEC OTC 20 MG tablet   Generic drug:  omeprazole      Take 20 mg by mouth daily        simvastatin 40 MG tablet    ZOCOR    90 tablet    Take 1 tablet (40 mg) by mouth At Bedtime    Coronary artery disease involving native coronary artery of native heart without angina pectoris
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
## 2018-03-15 ENCOUNTER — OFFICE VISIT (OUTPATIENT)
Dept: CARDIOLOGY | Facility: CLINIC | Age: 68
End: 2018-03-15
Attending: INTERNAL MEDICINE
Payer: MEDICARE

## 2018-03-15 VITALS
SYSTOLIC BLOOD PRESSURE: 110 MMHG | BODY MASS INDEX: 23.52 KG/M2 | WEIGHT: 137.8 LBS | DIASTOLIC BLOOD PRESSURE: 80 MMHG | HEART RATE: 76 BPM | HEIGHT: 64 IN

## 2018-03-15 DIAGNOSIS — I20.1 CORONARY ARTERY SPASM (H): ICD-10-CM

## 2018-03-15 DIAGNOSIS — I25.42 SPONTANEOUS DISSECTION OF CORONARY ARTERY: ICD-10-CM

## 2018-03-15 DIAGNOSIS — I25.2 H/O ACUTE MYOCARDIAL INFARCTION OF ANTERIOR WALL: ICD-10-CM

## 2018-03-15 DIAGNOSIS — E78.00 PURE HYPERCHOLESTEROLEMIA: ICD-10-CM

## 2018-03-15 DIAGNOSIS — I25.10 CORONARY ARTERY DISEASE INVOLVING NATIVE CORONARY ARTERY OF NATIVE HEART WITHOUT ANGINA PECTORIS: Primary | ICD-10-CM

## 2018-03-15 PROBLEM — I20.0 UNSTABLE ANGINA (H): Status: RESOLVED | Noted: 2017-09-10 | Resolved: 2018-03-15

## 2018-03-15 LAB
CHOLEST SERPL-MCNC: 216 MG/DL
HDLC SERPL-MCNC: 56 MG/DL
LDLC SERPL CALC-MCNC: 144 MG/DL
NONHDLC SERPL-MCNC: 160 MG/DL
TRIGL SERPL-MCNC: 81 MG/DL

## 2018-03-15 PROCEDURE — 80061 LIPID PANEL: CPT | Performed by: INTERNAL MEDICINE

## 2018-03-15 PROCEDURE — 36415 COLL VENOUS BLD VENIPUNCTURE: CPT | Performed by: INTERNAL MEDICINE

## 2018-03-15 PROCEDURE — 99214 OFFICE O/P EST MOD 30 MIN: CPT | Performed by: INTERNAL MEDICINE

## 2018-03-15 RX ORDER — EZETIMIBE 10 MG/1
10 TABLET ORAL DAILY
Qty: 90 TABLET | Refills: 3 | Status: SHIPPED | OUTPATIENT
Start: 2018-03-15 | End: 2018-05-17
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
## 2018-03-15 NOTE — MR AVS SNAPSHOT
"              After Visit Summary   3/15/2018    Ania Castillo    MRN: [id number]           Patient Information     Date Of Birth          1950        Visit Information        Provider Department      3/15/2018 9:15 AM August Mario MD [facility]        Today's Diagnoses     Coronary artery disease involving native coronary artery of native heart without angina pectoris    -  1    H/O acute myocardial infarction of anterior wall        Coronary artery spasm (H)        Pure hypercholesterolemia        Spontaneous dissection of coronary artery           Follow-ups after your visit        Who to contact     If you have questions or need follow up information about today's clinic visit or your schedule please contact [facility] directly at [phone].  Normal or non-critical lab and imaging results will be communicated to you by [email or web address], letter or phone within 4 business days after the clinic has received the results. If you do not hear from us within 7 days, please contact the clinic through [email or web address] or phone. If you have a critical or abnormal lab result, we will notify you by phone as soon as possible.  Submit refill requests through CX or call your pharmacy and they will forward the refill request to us. Please allow 3 business days for your refill to be completed.          Additional Information About Your Visit        [email or web address] Information     CX lets you send messages to your doctor, view your test results, renew your prescriptions, schedule appointments and more. To sign up, go to [email or web address] . Click on \"Log in\" on the left side of the screen, which will take you to the Welcome page. Then click on \"Sign up Now\" on the right side of the page.     You will be asked to enter the access code listed below, as well as some personal information. Please follow the directions to create your username and " "password.     Your access code is: [email or web address]  Expires: 2018  9:44 AM     Your access code will  in 90 days. If you need help or a new code, please call your [facility] [place] clinic or [phone].        Care EveryWhere ID     This is your Care EveryWhere ID. This could be used by other organizations to access your [facility] medical records  [id number]        Your Vitals Were     Pulse Height BMI (Body Mass Index)             76 1.626 m (5' 4\") 23.65 kg/m2          Blood Pressure from Last 3 Encounters:   03/15/18 110/80   10/02/17 110/70   17 96/58    Weight from Last 3 Encounters:   03/15/18 62.5 kg (137 lb 12.8 oz)   10/02/17 62.1 kg (137 lb)   17 61.4 kg (135 lb 6.4 oz)              We Performed the Following     Follow-Up with Cardiologist          Where to get your medicines      These medications were sent to [facility] [address] [place] [address] [facility]  [address] [facility] [address]     Phone:  [phone]     ezetimibe 10 MG tablet          Primary Care Provider Office Phone # Fax #    Ana ROBINSON Smith [phone]       [facility] [address] [place] [place] [address]        Equal Access to Services     [facility] AH: Hadii orlin durbin hadfranciso Soaruna, waaxda luqadaha, qaybta kaalmada aderebelyada, ro hernández. So [facility] [phone].    ATENCIÓN: Si habla español, tiene a pedraza disposición servicios gratuitos de asistencia lingüística. Llame al [phone].    We comply with applicable federal civil rights laws and [place] laws. We do not discriminate on the basis of race, color, national origin, age, disability, sex, sexual orientation, or gender identity.            Thank you!     Thank you for choosing [facility]  for your care. Our goal is always to provide you with excellent care. Hearing back from our patients is one way we can continue to improve our " services. Please take a few minutes to complete the written survey that you may receive in the mail after your visit with us. Thank you!             Your Updated Medication List - Protect others around you: Learn how to safely use, store and throw away your medicines at www.disposemymeds.org.          This list is accurate as of 3/15/18  9:44 AM.  Always use your most recent med list.                   Brand Name Dispense Instructions for use Diagnosis    acetaminophen 500 MG tablet    TYLENOL     Take 1,000 mg by mouth every 6 hours as needed        aspirin 81 MG EC tablet      Take 81 mg by mouth daily.        BIOTIN PO      Take 1 tablet by mouth daily 1000 mg        ezetimibe 10 MG tablet    ZETIA    90 tablet    Take 1 tablet (10 mg) by mouth daily    Coronary artery disease involving native coronary artery of native heart without angina pectoris       fish oil-omega-3 fatty acids 1000 MG capsule      Take 1 g by mouth daily.        losartan 25 MG tablet    COZAAR    90 tablet    Take 1 tablet (25 mg) by mouth daily    Coronary artery disease involving native coronary artery of native heart without angina pectoris       metoprolol succinate 25 MG 24 hr tablet    TOPROL-XL    90 tablet    Take 1 tablet (25 mg) by mouth daily    Coronary artery disease involving native coronary artery of native heart without angina pectoris       nitroGLYcerin 0.4 MG sublingual tablet    NITROSTAT    25 tablet    Place 1 tablet (0.4 mg) under the tongue every 5 minutes as needed    Coronary artery disease involving native coronary artery of native heart without angina pectoris       NORTRIPTYLINE HCL PO      Take 40 mg by mouth At Bedtime        priLOSEC OTC 20 MG tablet   Generic drug:  omeprazole      Take 20 mg by mouth daily        simvastatin 40 MG tablet    ZOCOR    90 tablet    Take 1 tablet (40 mg) by mouth At Bedtime    Coronary artery disease involving native coronary artery of native heart without angina pectoris
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
## 2018-04-02 ENCOUNTER — TELEPHONE (OUTPATIENT)
Dept: CONSULT | Facility: CLINIC | Age: 68
End: 2018-04-02

## 2018-04-02 NOTE — TELEPHONE ENCOUNTER
I spoke with Ania regarding her son Angelique's genetic test results.  Angelique had previously signed a consent to communicate form allowing us to discuss his results and other health-related issues with her.    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  This specific duplication contains about 80 genes.  Per the interpretive report, there is no known association between gains within this region and adverse clinical effects.  In addition, there are very few reports of disorders that have been associated with the genes in this region on chromosome 19.  The clinical significance of this copy number gain is unknown, and therefore we do not know if this duplication could be responsible for Angelique's symptoms, or whether the duplication may be a harmless / benign variant.  It is possible that this chromosome 19 duplication occurred brand new in Angelique, and it is also possible that the duplication was inherited from a parent.     In order to help interpret the significance of the variant, the cytogenetics lab is recommending parental testing (limited array C[facility]), to be done at no charge.  Angelique's father is  and is therefore unavailable for testing.  Angelique's mother, Ania, is agreeable to the testing and she plans to come next Tuesday for a brief genetic counseling appointment / consent sign and the lab draw.  If we learn that the 19q duplication was inherited from Ania, this would provide more evidence to suggest that the variant may be benign.  I will notify Ania when her results are available.      Apolonia Bettencourt MS, [facility]  Certified Genetic Counselor  [facility] [place]  Phone: [phone]

## 2018-04-09 DIAGNOSIS — Z82.79 FAMILY HISTORY OF CONGENITAL OR GENETIC CONDITION: Primary | ICD-10-CM

## 2018-04-10 ENCOUNTER — OFFICE VISIT (OUTPATIENT)
Dept: CONSULT | Facility: CLINIC | Age: 68
End: 2018-04-10
Attending: GENETIC COUNSELOR, MS
Payer: MEDICARE

## 2018-04-10 DIAGNOSIS — Z82.79 FAMILY HISTORY OF CONGENITAL OR GENETIC CONDITION: ICD-10-CM

## 2018-04-10 DIAGNOSIS — Z82.79 FAMILY HISTORY OF CONGENITAL OR GENETIC CONDITION: Primary | ICD-10-CM

## 2018-04-10 PROCEDURE — 40000072 ZZH STATISTIC GENETIC COUNSELING, < 16 MIN: Mod: ZF | Performed by: GENETIC COUNSELOR, MS

## 2018-04-10 PROCEDURE — 40000891 ZZHCL STATISTIC CGH PARENTAL FOLLOW UP 81228: Performed by: MEDICAL GENETICS

## 2018-04-10 PROCEDURE — 36415 COLL VENOUS BLD VENIPUNCTURE: CPT | Performed by: MEDICAL GENETICS

## 2018-04-10 PROCEDURE — 40000803 ZZHCL STATISTIC DNA ISOL HIGH PURITY: Performed by: MEDICAL GENETICS

## 2018-04-10 NOTE — MR AVS SNAPSHOT
After Visit Summary   4/10/2018    Ania Castillo    MRN: [id number]           Patient Information     Date Of Birth          1950        Visit Information        Provider Department      4/10/2018 12:00 PM Apolonia Bettencourt [facility] Peds Genetics        Today's Diagnoses     Family history of congenital or genetic condition    -  1       Follow-ups after your visit        Who to contact     Please call your clinic at [phone] to:    Ask questions about your health    Make or cancel appointments    Discuss your medicines    Learn about your test results    Speak to your doctor            Additional Information About Your Visit        MyChart Information     Mobstats is an electronic gateway that provides easy, online access to your medical records. With Mobstats, you can request a clinic appointment, read your test results, renew a prescription or communicate with your care team.     To sign up for Mobstats visit the website at [email or web address]   You will be asked to enter the access code listed below, as well as some personal information. Please follow the directions to create your username and password.     Your access code is: [email or web address]  Expires: 2018  9:44 AM     Your access code will  in 90 days. If you need help or a new code, please contact your [facility] or call [phone] for assistance.        Care EveryWhere ID     This is your Care EveryWhere ID. This could be used by other organizations to access your [facility] medical records  [id number]         Blood Pressure from Last 3 Encounters:   03/15/18 110/80   10/02/17 110/70   17 96/58    Weight from Last 3 Encounters:   03/15/18 137 lb 12.8 oz (62.5 kg)   10/02/17 137 lb (62.1 kg)   17 135 lb 6.4 oz (61.4 kg)              Today, you had the following     No orders found for display       Primary Care Provider Office Phone # Fax #    Ana Smith [phone]        [facility] [address] [place] [address]        Equal Access to Services     GENAROTHANH ANTIONETTE : Hadii orlin durbin hadfrancischristine Sofatimahali, waaxda luqadaha, qaybta kabobda sarakerlinecarlos, ro wilkinskillianoctavio hernández. So [facility] [phone].    ATENCIÓN: Si habla español, tiene a pedraza disposición servicios gratuitos de asistencia lingüística. Llame al [phone].    We comply with applicable federal civil rights laws and [place] laws. We do not discriminate on the basis of race, color, national origin, age, disability, sex, sexual orientation, or gender identity.            Thank you!     Thank you for choosing [facility]  for your care. Our goal is always to provide you with excellent care. Hearing back from our patients is one way we can continue to improve our services. Please take a few minutes to complete the written survey that you may receive in the mail after your visit with us. Thank you!             Your Updated Medication List - Protect others around you: Learn how to safely use, store and throw away your medicines at www.disposemymeds.org.          This list is accurate as of 4/10/18 12:56 PM.  Always use your most recent med list.                   Brand Name Dispense Instructions for use Diagnosis    acetaminophen 500 MG tablet    TYLENOL     Take 1,000 mg by mouth every 6 hours as needed        aspirin 81 MG EC tablet      Take 81 mg by mouth daily.        BIOTIN PO      Take 1 tablet by mouth daily 1000 mg        ezetimibe 10 MG tablet    ZETIA    90 tablet    Take 1 tablet (10 mg) by mouth daily    Coronary artery disease involving native coronary artery of native heart without angina pectoris       fish oil-omega-3 fatty acids 1000 MG capsule      Take 1 g by mouth daily.        losartan 25 MG tablet    COZAAR    90 tablet    Take 1 tablet (25 mg) by mouth daily    Coronary artery disease involving native coronary artery of native heart without angina pectoris       metoprolol  succinate 25 MG 24 hr tablet    TOPROL-XL    90 tablet    Take 1 tablet (25 mg) by mouth daily    Coronary artery disease involving native coronary artery of native heart without angina pectoris       nitroGLYcerin 0.4 MG sublingual tablet    NITROSTAT    25 tablet    Place 1 tablet (0.4 mg) under the tongue every 5 minutes as needed    Coronary artery disease involving native coronary artery of native heart without angina pectoris       NORTRIPTYLINE HCL PO      Take 40 mg by mouth At Bedtime        priLOSEC OTC 20 MG tablet   Generic drug:  omeprazole      Take 20 mg by mouth daily        simvastatin 40 MG tablet    ZOCOR    90 tablet    Take 1 tablet (40 mg) by mouth At Bedtime    Coronary artery disease involving native coronary artery of native heart without angina pectoris

## 2018-04-10 NOTE — LETTER
4/10/2018      RE: Aniaalia Castillo  [address] [place] [address]       Date of Visit: 4/10/18    I met with Ania Castillo for a brief genetic consult to obtain informed consent for targeted / limited parental array CGH testing.  I first met with Ania and her son, Angelique, in genetics clinic on 18.  Angelique was referred due to his history of schizophrenia and mild autism spectrum disorder.  Angelique had blood drawn for fragile X syndrome testing and chromosome analysis.  I previously reviewed Angelique's results by phone with both Angelique and Ania (Angelique had previously signed a consent to communicate form allowing us to discuss his results and other health-related issues with Ania).     Angelique's fragile X syndrome results are normal.  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  This specific duplication contains about 80 genes.  Per the interpretive report, there is no known association between gains within this region and adverse clinical effects.  In addition, there are very few reports of disorders that have been associated with the genes in this region on chromosome 19.  The clinical significance of this copy number gain is unknown, and therefore we do not know if this duplication could be responsible for Angelique's symptoms, or whether the duplication may be a harmless / benign variant.  It is possible that this chromosome 19 duplication occurred brand new in Angelique, and it is also possible that the duplication was inherited from a parent.       In order to help interpret the significance of the variant, the cytogenetics lab is recommending parental testing for the chromosome 19 duplication (limited array CGH), to be done at no charge.  Angelique's father is  and is therefore unavailable for testing.  Ania had agreed to do the testing and she came today for the lab draw and consent sign.  If we learn that the 19q duplication was inherited from  Ania, this would provide evidence to suggest that the variant may be benign.  I will notify Ania when her results are available.    8 minutes face to face.      Apolonia Bettencourt MS, [facility]  Certified Genetic Counselor  [facility]

## 2018-04-10 NOTE — PROGRESS NOTES
Date of Visit: 4/10/18    I met with Ania Castillo for a brief genetic consult to obtain informed consent for targeted / limited parental array CGH testing.  I first met with Ania and her son, Angelique, in genetics clinic on 18.  Angelique was referred due to his history of schizophrenia and mild autism spectrum disorder.  Angelique had blood drawn for fragile X syndrome testing and chromosome analysis.  I previously reviewed Angelique's results by phone with both Angelique and Ania (Angelique had previously signed a consent to communicate form allowing us to discuss his results and other health-related issues with Ania).     Angelique's fragile X syndrome results are normal.  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  This specific duplication contains about 80 genes.  Per the interpretive report, there is no known association between gains within this region and adverse clinical effects.  In addition, there are very few reports of disorders that have been associated with the genes in this region on chromosome 19.  The clinical significance of this copy number gain is unknown, and therefore we do not know if this duplication could be responsible for Angelique's symptoms, or whether the duplication may be a harmless / benign variant.  It is possible that this chromosome 19 duplication occurred brand new in Angelique, and it is also possible that the duplication was inherited from a parent.       In order to help interpret the significance of the variant, the cytogenetics lab is recommending parental testing for the chromosome 19 duplication (limited array CGH), to be done at no charge.  Angelique's father is  and is therefore unavailable for testing.  Ania had agreed to do the testing and she came today for the lab draw and consent sign.  If we learn that the 19q duplication was inherited from Ania, this would provide evidence to suggest that the variant may be benign.  I  will notify Ania when her results are available.    8 minutes face to face.      Apolonia Bettencourt MS, [facility]  Certified Genetic Counselor  [facility]

## 2018-04-19 LAB — COPATH REPORT: NORMAL

## 2018-04-24 ENCOUNTER — TELEPHONE (OUTPATIENT)
Dept: CONSULT | Facility: CLINIC | Age: 68
End: 2018-04-24

## 2018-04-24 NOTE — TELEPHONE ENCOUNTER
I left a message for Ania on her dedicated voicemail explaining that her chromosome results are now available, and she does NOT have the same chromosome 19 duplication that was first identified in her son, Angelique.  I mentioned that the lab would be willing to test one of her other children free of charge (since her  is ), and I asked her to call me back for further discussion.      Apolonia Bettencourt MS, [facility]  Certified Genetic Counselor  2018  1:55 PM

## 2018-05-17 DIAGNOSIS — I25.10 CORONARY ARTERY DISEASE INVOLVING NATIVE CORONARY ARTERY OF NATIVE HEART WITHOUT ANGINA PECTORIS: ICD-10-CM

## 2018-05-17 RX ORDER — EZETIMIBE 10 MG/1
10 TABLET ORAL DAILY
Qty: 90 TABLET | Refills: 3 | Status: SHIPPED | OUTPATIENT
Start: 2018-05-17 | End: 2020-04-22

## 2018-06-07 DIAGNOSIS — I25.10 CORONARY ARTERY DISEASE INVOLVING NATIVE CORONARY ARTERY OF NATIVE HEART WITHOUT ANGINA PECTORIS: ICD-10-CM

## 2018-06-07 RX ORDER — SIMVASTATIN 40 MG
40 TABLET ORAL AT BEDTIME
Qty: 90 TABLET | Refills: 3 | Status: SHIPPED | OUTPATIENT
Start: 2018-06-07 | End: 2020-06-17

## 2018-09-06 ENCOUNTER — OFFICE VISIT (OUTPATIENT)
Dept: CARDIOLOGY | Facility: CLINIC | Age: 68
End: 2018-09-06
Attending: INTERNAL MEDICINE
Payer: MEDICARE

## 2018-09-06 VITALS
DIASTOLIC BLOOD PRESSURE: 66 MMHG | HEART RATE: 68 BPM | SYSTOLIC BLOOD PRESSURE: 129 MMHG | WEIGHT: 135.6 LBS | BODY MASS INDEX: 23.28 KG/M2

## 2018-09-06 DIAGNOSIS — E78.00 PURE HYPERCHOLESTEROLEMIA: ICD-10-CM

## 2018-09-06 DIAGNOSIS — I25.10 CORONARY ARTERY DISEASE INVOLVING NATIVE CORONARY ARTERY OF NATIVE HEART WITHOUT ANGINA PECTORIS: Primary | ICD-10-CM

## 2018-09-06 DIAGNOSIS — I20.1 CORONARY ARTERY SPASM (H): ICD-10-CM

## 2018-09-06 DIAGNOSIS — I25.42 SPONTANEOUS DISSECTION OF CORONARY ARTERY: ICD-10-CM

## 2018-09-06 LAB
ANION GAP SERPL CALCULATED.3IONS-SCNC: 13.1 MMOL/L (ref 6–17)
BUN SERPL-MCNC: 14 MG/DL (ref 7–30)
CALCIUM SERPL-MCNC: 9.5 MG/DL (ref 8.5–10.5)
CHLORIDE SERPL-SCNC: 103 MMOL/L (ref 98–107)
CHOLEST SERPL-MCNC: 174 MG/DL
CO2 SERPL-SCNC: 27 MMOL/L (ref 23–29)
CREAT SERPL-MCNC: 0.88 MG/DL (ref 0.7–1.3)
GFR SERPL CREATININE-BSD FRML MDRD: 64 ML/MIN/1.7M2
GLUCOSE SERPL-MCNC: 103 MG/DL (ref 70–105)
HDLC SERPL-MCNC: 57 MG/DL
LDLC SERPL CALC-MCNC: 106 MG/DL
NONHDLC SERPL-MCNC: 117 MG/DL
POTASSIUM SERPL-SCNC: 4.1 MMOL/L (ref 3.5–5.1)
SODIUM SERPL-SCNC: 139 MMOL/L (ref 136–145)
TRIGL SERPL-MCNC: 57 MG/DL

## 2018-09-06 PROCEDURE — 99214 OFFICE O/P EST MOD 30 MIN: CPT | Performed by: INTERNAL MEDICINE

## 2018-09-06 PROCEDURE — 80048 BASIC METABOLIC PNL TOTAL CA: CPT | Performed by: INTERNAL MEDICINE

## 2018-09-06 PROCEDURE — 80061 LIPID PANEL: CPT | Performed by: INTERNAL MEDICINE

## 2018-09-06 PROCEDURE — 36415 COLL VENOUS BLD VENIPUNCTURE: CPT | Performed by: INTERNAL MEDICINE

## 2018-09-06 NOTE — MR AVS SNAPSHOT
"              After Visit Summary   9/6/2018    Ania Castillo    MRN: [id number]           Patient Information     Date Of Birth          1950        Visit Information        Provider Department      9/6/2018 8:45 AM August Mario MD [facility]        Today's Diagnoses     Coronary artery disease involving native coronary artery of native heart without angina pectoris    -  1    Coronary artery spasm (H)        Pure hypercholesterolemia        Spontaneous dissection of coronary artery           Follow-ups after your visit        Additional Services     Follow-Up with Cardiologist                 Future tests that were ordered for you today     Open Future Orders        Priority Expected Expires Ordered    Lipid Profile Routine 6/3/2019 9/6/2019 9/6/2018    ALT Routine 6/3/2019 9/6/2019 9/6/2018    Follow-Up with Cardiologist Routine 6/3/2019 9/6/2019 9/6/2018            Who to contact     If you have questions or need follow up information about today's clinic visit or your schedule please contact [facility] directly at [phone].  Normal or non-critical lab and imaging results will be communicated to you by kooldinerhart, letter or phone within 4 business days after the clinic has received the results. If you do not hear from us within 7 days, please contact the clinic through Truistt or phone. If you have a critical or abnormal lab result, we will notify you by phone as soon as possible.  Submit refill requests through [email or web address] or call your pharmacy and they will forward the refill request to us. Please allow 3 business days for your refill to be completed.          Additional Information About Your Visit        MyChart Information     [email or web address] lets you send messages to your doctor, view your test results, renew your prescriptions, schedule appointments and more. To sign up, go to [email or web address] . Click on \"Log in\" on the " "left side of the screen, which will take you to the Welcome page. Then click on \"Sign up Now\" on the right side of the page.     You will be asked to enter the access code listed below, as well as some personal information. Please follow the directions to create your username and password.     Your access code is: [email or web address]  Expires: 2018  9:04 AM     Your access code will  in 90 days. If you need help or a new code, please call your [facility] clinic or [phone].        Care EveryWhere ID     This is your Care EveryWhere ID. This could be used by other organizations to access your [facility] medical records  [id number]        Your Vitals Were     Pulse BMI (Body Mass Index)                68 23.28 kg/m2           Blood Pressure from Last 3 Encounters:   18 129/66   03/15/18 110/80   10/02/17 110/70    Weight from Last 3 Encounters:   18 61.5 kg (135 lb 9.6 oz)   03/15/18 62.5 kg (137 lb 12.8 oz)   10/02/17 62.1 kg (137 lb)              We Performed the Following     Follow-Up with Cardiologist        Primary Care Provider Office Phone # Fax #    Ana Smith [phone]       [facility] [address] [place] [place] [address]        Equal Access to Services     [facility]: Hadii aad ku hadasho Soaruna, waaxda luqadaha, qaybta kaalmada aderebelyada, ro horton . So [facility] [place] [facility] [phone].    ATENCIÓN: Si habla español, tiene a pedraza disposición servicios gratuitos de asistencia lingüística. Llame al [phone].    We comply with applicable federal civil rights laws and [place] laws. We do not discriminate on the basis of race, color, national origin, age, disability, sex, sexual orientation, or gender identity.            Thank you!     Thank you for choosing [facility]  for your care. Our goal is always to provide you with excellent care. Hearing back from our patients is one way we can " continue to improve our services. Please take a few minutes to complete the written survey that you may receive in the mail after your visit with us. Thank you!             Your Updated Medication List - Protect others around you: Learn how to safely use, store and throw away your medicines at www.disposemymeds.org.          This list is accurate as of 9/6/18  9:04 AM.  Always use your most recent med list.                   Brand Name Dispense Instructions for use Diagnosis    acetaminophen 500 MG tablet    TYLENOL     Take 1,000 mg by mouth every 6 hours as needed        aspirin 81 MG EC tablet      Take 81 mg by mouth daily.        BIOTIN PO      Take 1 tablet by mouth daily 1000 mg        ezetimibe 10 MG tablet    ZETIA    90 tablet    Take 1 tablet (10 mg) by mouth daily    Coronary artery disease involving native coronary artery of native heart without angina pectoris       fish oil-omega-3 fatty acids 1000 MG capsule      Take 1 g by mouth daily.        losartan 25 MG tablet    COZAAR    90 tablet    Take 1 tablet (25 mg) by mouth daily    Coronary artery disease involving native coronary artery of native heart without angina pectoris       metoprolol succinate 25 MG 24 hr tablet    TOPROL-XL    90 tablet    Take 1 tablet (25 mg) by mouth daily    Coronary artery disease involving native coronary artery of native heart without angina pectoris       nitroGLYcerin 0.4 MG sublingual tablet    NITROSTAT    25 tablet    Place 1 tablet (0.4 mg) under the tongue every 5 minutes as needed    Coronary artery disease involving native coronary artery of native heart without angina pectoris       NORTRIPTYLINE HCL PO      Take 40 mg by mouth At Bedtime        priLOSEC OTC 20 MG tablet   Generic drug:  omeprazole      Take 20 mg by mouth daily        simvastatin 40 MG tablet    ZOCOR    90 tablet    Take 1 tablet (40 mg) by mouth At Bedtime    Coronary artery disease involving native coronary artery of native heart  without angina pectoris       vitamin D3 1000 units Caps      2 capsules daily
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
## 2019-05-31 ENCOUNTER — TELEPHONE (OUTPATIENT)
Dept: CARDIOLOGY | Facility: CLINIC | Age: 69
End: 2019-05-31

## 2019-08-27 DIAGNOSIS — I25.10 CORONARY ARTERY DISEASE INVOLVING NATIVE CORONARY ARTERY OF NATIVE HEART WITHOUT ANGINA PECTORIS: Primary | ICD-10-CM

## 2019-08-31 PROCEDURE — 99285 EMERGENCY DEPT VISIT HI MDM: CPT

## 2019-09-01 ENCOUNTER — APPOINTMENT (OUTPATIENT)
Dept: CARDIOLOGY | Facility: CLINIC | Age: 69
End: 2019-09-01
Attending: INTERNAL MEDICINE
Payer: MEDICARE

## 2019-09-01 ENCOUNTER — APPOINTMENT (OUTPATIENT)
Dept: GENERAL RADIOLOGY | Facility: CLINIC | Age: 69
End: 2019-09-01
Attending: NURSE PRACTITIONER
Payer: MEDICARE

## 2019-09-01 ENCOUNTER — HOSPITAL ENCOUNTER (OUTPATIENT)
Facility: CLINIC | Age: 69
Setting detail: OBSERVATION
Discharge: HOME | End: 2019-09-01
Attending: NURSE PRACTITIONER | Admitting: INTERNAL MEDICINE
Payer: MEDICARE

## 2019-09-01 VITALS
DIASTOLIC BLOOD PRESSURE: 55 MMHG | HEART RATE: 68 BPM | WEIGHT: 130.1 LBS | RESPIRATION RATE: 12 BRPM | BODY MASS INDEX: 22.33 KG/M2 | OXYGEN SATURATION: 97 % | TEMPERATURE: 97.4 F | SYSTOLIC BLOOD PRESSURE: 115 MMHG

## 2019-09-01 DIAGNOSIS — I25.10 CORONARY ARTERY DISEASE INVOLVING NATIVE CORONARY ARTERY OF NATIVE HEART WITHOUT ANGINA PECTORIS: Primary | ICD-10-CM

## 2019-09-01 DIAGNOSIS — R07.9 CHEST PAIN: ICD-10-CM

## 2019-09-01 LAB
ANION GAP SERPL CALCULATED.3IONS-SCNC: 5 MMOL/L (ref 3–14)
BUN SERPL-MCNC: 14 MG/DL (ref 7–30)
CALCIUM SERPL-MCNC: 8.6 MG/DL (ref 8.5–10.1)
CHLORIDE SERPL-SCNC: 106 MMOL/L (ref 94–109)
CO2 SERPL-SCNC: 30 MMOL/L (ref 20–32)
CREAT SERPL-MCNC: 0.8 MG/DL (ref 0.52–1.04)
ERYTHROCYTE [DISTWIDTH] IN BLOOD BY AUTOMATED COUNT: 12.7 % (ref 10–15)
GFR SERPL CREATININE-BSD FRML MDRD: 75 ML/MIN/{1.73_M2}
GLUCOSE SERPL-MCNC: 105 MG/DL (ref 70–99)
HCT VFR BLD AUTO: 38 % (ref 35–47)
HGB BLD-MCNC: 12.1 G/DL (ref 11.7–15.7)
MCH RBC QN AUTO: 28.5 PG (ref 26.5–33)
MCHC RBC AUTO-ENTMCNC: 31.8 G/DL (ref 31.5–36.5)
MCV RBC AUTO: 89 FL (ref 78–100)
PLATELET # BLD AUTO: 317 10E9/L (ref 150–450)
POTASSIUM SERPL-SCNC: 3.5 MMOL/L (ref 3.4–5.3)
RBC # BLD AUTO: 4.25 10E12/L (ref 3.8–5.2)
SODIUM SERPL-SCNC: 141 MMOL/L (ref 133–144)
TROPONIN I BLD-MCNC: 0 UG/L (ref 0–0.08)
TROPONIN I SERPL-MCNC: <0.015 UG/L (ref 0–0.04)
WBC # BLD AUTO: 6.6 10E9/L (ref 4–11)

## 2019-09-01 PROCEDURE — 25500064 ZZH RX 255 OP 636: Performed by: INTERNAL MEDICINE

## 2019-09-01 PROCEDURE — 99219 ZZC INITIAL OBSERVATION CARE,LEVL II: CPT | Performed by: INTERNAL MEDICINE

## 2019-09-01 PROCEDURE — 84484 ASSAY OF TROPONIN QUANT: CPT | Performed by: INTERNAL MEDICINE

## 2019-09-01 PROCEDURE — 71046 X-RAY EXAM CHEST 2 VIEWS: CPT

## 2019-09-01 PROCEDURE — 93005 ELECTROCARDIOGRAM TRACING: CPT

## 2019-09-01 PROCEDURE — 36415 COLL VENOUS BLD VENIPUNCTURE: CPT | Performed by: INTERNAL MEDICINE

## 2019-09-01 PROCEDURE — 40000264 ECHOCARDIOGRAM COMPLETE

## 2019-09-01 PROCEDURE — G0378 HOSPITAL OBSERVATION PER HR: HCPCS

## 2019-09-01 PROCEDURE — 85027 COMPLETE CBC AUTOMATED: CPT | Performed by: NURSE PRACTITIONER

## 2019-09-01 PROCEDURE — 25000132 ZZH RX MED GY IP 250 OP 250 PS 637: Mod: GY | Performed by: NURSE PRACTITIONER

## 2019-09-01 PROCEDURE — 93306 TTE W/DOPPLER COMPLETE: CPT | Mod: 26 | Performed by: INTERNAL MEDICINE

## 2019-09-01 PROCEDURE — 84484 ASSAY OF TROPONIN QUANT: CPT | Mod: 91 | Performed by: NURSE PRACTITIONER

## 2019-09-01 PROCEDURE — 84484 ASSAY OF TROPONIN QUANT: CPT

## 2019-09-01 PROCEDURE — 80048 BASIC METABOLIC PNL TOTAL CA: CPT | Performed by: NURSE PRACTITIONER

## 2019-09-01 RX ORDER — SIMVASTATIN 40 MG
40 TABLET ORAL AT BEDTIME
Status: DISCONTINUED | OUTPATIENT
Start: 2019-09-01 | End: 2019-09-01 | Stop reason: HOSPADM

## 2019-09-01 RX ORDER — ACETAMINOPHEN 325 MG/1
650 TABLET ORAL EVERY 4 HOURS PRN
Status: DISCONTINUED | OUTPATIENT
Start: 2019-09-01 | End: 2019-09-01 | Stop reason: HOSPADM

## 2019-09-01 RX ORDER — NITROGLYCERIN 0.4 MG/1
0.4 TABLET SUBLINGUAL EVERY 5 MIN PRN
Status: DISCONTINUED | OUTPATIENT
Start: 2019-09-01 | End: 2019-09-01 | Stop reason: HOSPADM

## 2019-09-01 RX ORDER — NITROGLYCERIN 0.4 MG/1
0.4 TABLET SUBLINGUAL EVERY 5 MIN PRN
Status: DISCONTINUED | OUTPATIENT
Start: 2019-09-01 | End: 2019-09-01

## 2019-09-01 RX ORDER — LIDOCAINE 40 MG/G
CREAM TOPICAL
Status: DISCONTINUED | OUTPATIENT
Start: 2019-09-01 | End: 2019-09-01 | Stop reason: HOSPADM

## 2019-09-01 RX ORDER — METOPROLOL SUCCINATE 25 MG/1
25 TABLET, EXTENDED RELEASE ORAL DAILY
Status: DISCONTINUED | OUTPATIENT
Start: 2019-09-01 | End: 2019-09-01 | Stop reason: HOSPADM

## 2019-09-01 RX ORDER — ALUMINA, MAGNESIA, AND SIMETHICONE 2400; 2400; 240 MG/30ML; MG/30ML; MG/30ML
30 SUSPENSION ORAL EVERY 4 HOURS PRN
Status: DISCONTINUED | OUTPATIENT
Start: 2019-09-01 | End: 2019-09-01 | Stop reason: HOSPADM

## 2019-09-01 RX ORDER — ISOSORBIDE MONONITRATE 30 MG/1
15 TABLET, EXTENDED RELEASE ORAL DAILY
Qty: 30 TABLET | Refills: 1 | Status: SHIPPED | OUTPATIENT
Start: 2019-09-01 | End: 2020-11-06

## 2019-09-01 RX ORDER — ASPIRIN 81 MG/1
81 TABLET ORAL DAILY
Status: DISCONTINUED | OUTPATIENT
Start: 2019-09-01 | End: 2019-09-01 | Stop reason: HOSPADM

## 2019-09-01 RX ORDER — NALOXONE HYDROCHLORIDE 0.4 MG/ML
.1-.4 INJECTION, SOLUTION INTRAMUSCULAR; INTRAVENOUS; SUBCUTANEOUS
Status: DISCONTINUED | OUTPATIENT
Start: 2019-09-01 | End: 2019-09-01 | Stop reason: HOSPADM

## 2019-09-01 RX ORDER — LOSARTAN POTASSIUM 25 MG/1
25 TABLET ORAL DAILY
Status: DISCONTINUED | OUTPATIENT
Start: 2019-09-01 | End: 2019-09-01 | Stop reason: HOSPADM

## 2019-09-01 RX ADMIN — NITROGLYCERIN 0.4 MG: 0.4 TABLET SUBLINGUAL at 00:39

## 2019-09-01 RX ADMIN — HUMAN ALBUMIN MICROSPHERES AND PERFLUTREN 3 ML: 10; .22 INJECTION, SOLUTION INTRAVENOUS at 07:55

## 2019-09-01 ASSESSMENT — ENCOUNTER SYMPTOMS
SHORTNESS OF BREATH: 0
VOMITING: 0
DYSURIA: 0
ABDOMINAL PAIN: 0
FREQUENCY: 0
CHILLS: 0
NAUSEA: 0
FEVER: 0

## 2019-09-01 NOTE — ED PROVIDER NOTES
History     Chief Complaint:  Chest Pain    HPI   Ania Castillo is a 69 year old female who presents with left sided chest pain.  She has nitro at home and developed chest pain yesterday which required 2 doses to resolve her symptoms.  Today the chest pain returned and she had to take a total of 3 doses throughout the day prior to presentation.  She has not had any cough or cold symptoms.  She denies any fevers.  No history of CHF or COPD.  She has not had a stress test for at least 2 years.  Denies any nausea or vomiting.  No abdominal pain.  No other concerns.    Allergies:  Atorvastatin  Rosuvastatin   Cephalexin  Escitalopram  Lisinopril   Nsaids  Tramadol     Medications:    Asprin  Biotin  Zetia  Metoprolol  Nitroglycerine  Omeprazole  Zocor  Cozaar  Ditropan   Pamelor    Past Medical History:    Anemia  CAD  Chronic back pain  GERD  Hyperlipidemia  Hypertension  Myocardial infarction   Depression  Anxiety  Osteoarthritis   Vitamin D deficiency   Osteopenia   Diaphragmatic hernia    Past Surgical History:    Angioplasty and stenting of the left anterior descending artery for 70% LAD stenosis  Coronary angiography  Heart cath, angioplasty  ACL reconstruction  Open bladder suspension  Cataract removal    Family History:    Heart disease  Hypertension  Hyperlipidemia   Diabetes    Social History:  The patient was accompanied to the ED by self.  Smoking Status: Never Smoker  Smokeless Tobacco: Never Used  Alcohol Use: Negative  Marital Status:   [2]     Review of Systems   Constitutional: Negative for chills and fever.   Respiratory: Negative for shortness of breath.    Cardiovascular: Positive for chest pain.   Gastrointestinal: Negative for abdominal pain, nausea and vomiting.   Genitourinary: Negative for dysuria and frequency.   All other systems reviewed and are negative.      Physical Exam     Patient Vitals for the past 24 hrs:   BP Temp Temp src Pulse Heart Rate Resp SpO2 Weight   09/01/19 0130  137/59 -- -- 68 69 15 97 % --   09/01/19 0115 107/67 -- -- 69 70 12 95 % --   09/01/19 0100 104/67 -- -- 65 66 22 97 % --   09/01/19 0045 107/60 -- -- 83 78 21 92 % --   09/01/19 0030 132/72 -- -- 75 71 13 96 % --   09/01/19 0015 125/73 -- -- 71 -- -- -- --   09/01/19 0004 (!) 151/85 98.3  F (36.8  C) Temporal -- 71 16 100 % 61.2 kg (135 lb)       Physical Exam  General: Alert, No obvious discomfort, well kept  Eyes: PERRL, conjunctivae pink no scleral icterus or conjunctival injection  ENT:   Moist mucus membranes, posterior oropharynx clear without erythema or exudates, No lymphadenopathy, Normal voice  Resp:  Lungs clear to auscultation bilaterally, no crackles/rubs/wheezes. Good air movement  CV:  Normal rate and rhythm, no murmurs/rubs/gallops  GI:  Abdomen soft and non-distended.  Normoactive BS.  No tenderness, guarding or rebound, No masses  Skin:  Warm, dry.  No rashes or petechiae  Musculoskeletal: No peripheral edema or calf tenderness, Normal gross ROM   Neuro: Alert and oriented to person/place/time, normal sensation  Psychiatric: Normal affect, cooperative, good eye contact    Emergency Department Course   ECG:  Time: 0015  Vent. Rate 72 bpm. VT interval 164. QRS duration 86. QT/QTc 402/440. P-R-T axis 44 32 80.  Normal sinus rhythm  Possible Inferior infarct, age undetermined  Anterior infarct, age undetermined  Abnormal ECG  No significant change compared to EKG dated 09/25/17.  Read time: 0024      Imaging:  Recent Results (from the past 24 hour(s))   XR Chest 2 Views    Narrative    EXAM: XR CHEST 2 VW  LOCATION: [facility]  DATE/TIME: 9/1/2019 1:35 AM    INDICATION: Chest pain  COMPARISON: 09/25/2017    FINDINGS: Small hiatal hernia. Cardiac silhouette within normal limits. Stable mildly tortuous atherosclerotic aorta. No focal consolidation. No pneumothorax or pleural effusion. No acute osseous abnormality.       Laboratory:  CBC: WBC: 6.6, HGB 12.1, PLT: 317  BMP: Glucose 105 (H),  o/w WNL (Creatinine: 0.80)    0028 Troponin I ES: <0.015    0041 Troponin POCT: 0.00    Interventions:  Medications   nitroGLYcerin (NITROSTAT) sublingual tablet 0.4 mg (0.4 mg Sublingual Given 9/1/19 0039)       Emergency Department Course:  Patient evaluated    Findings and plan explained to the Patient and son and daughter who consents to admission. Discussed the patient with Dr. Wilson, who will admit the patient to a obs bed for further monitoring, evaluation, and treatment.       Impression & Plan      Medical Decision Making:  Ania Castillo is a 69 year old female who presents today for evaluation of chest pain.  She has a history of MI.  She does take nitro when she has episodes of chest discomfort.  Yesterday she had to take nitro 2 times to relieve her symptoms.  Today pain return and required her to take 3 doses.  When it returned the fourth time she presented for evaluation.  She is given a fourth dose of nitro here which did resolve her symptoms.  Her laboratory studies show negative troponin and otherwise noncontributory.  She has not nonacute EKG.  Given her recurrent chest pain over the last 2 days with no recent cardiac evaluation plan will be to admit patient to the observation unit for monitoring and likely stress test tomorrow.  I spoke to the hospitalist who did accept patient to his service.    Diagnosis:    ICD-10-CM    1. Chest pain R07.9        Disposition:  Admitted to observation unit      Edita Prince  8/31/2019   [facility] EMERGENCY DEPARTMENT       Romaine Sheth, MIQUEL CNP  09/01/19 0217

## 2019-09-01 NOTE — ED NOTES
[facility]  ED Nurse Handoff Report    Ania Castillo is a 69 year old female   ED Chief complaint: Chest Pain  . ED Diagnosis:   Final diagnoses:   Chest pain     Allergies:   Allergies   Allergen Reactions     Atorvastatin      myalgias     Rosuvastatin      myalgias       Code Status: Full Code  Activity level - Baseline/Home:  Independent. Activity Level - Current:   Independent. Lift room needed: No. Bariatric: No   Needed: No   Isolation: No. Infection: Not Applicable.     Vital Signs:   Vitals:    09/01/19 0045 09/01/19 0100 09/01/19 0115 09/01/19 0130   BP: 107/60 104/67 107/67 137/59   Pulse: 83 65 69 68   Resp: 21 22 12 15   Temp:       TempSrc:       SpO2: 92% 97% 95% 97%   Weight:           Cardiac Rhythm:  ,      Pain level: 0-10 Pain Scale: 0  Patient confused: No. Patient Falls Risk: No.   Elimination Status: Has voided   Patient Report - Initial Complaint: chest pain. Focused Assessment: Pt with c/o L sided CP. Took 2 nitroglycerin around 1730 which took away pain. Hx 2 MI. Pt took ASA PTA.     Tests Performed:   Labs Ordered and Resulted from Time of ED Arrival Up to the Time of Departure from the ED   BASIC METABOLIC PANEL - Abnormal; Notable for the following components:       Result Value    Glucose 105 (*)     All other components within normal limits   CBC WITH PLATELETS   TROPONIN I   MAY SALINE LOCK IV   ISTAT TROPONIN NURSING POCT   TROPONIN POCT     XR Chest 2 Views    (Results Pending)      Abnormal Results: see above   Treatments provided: see MAR  Family Comments: none present  OBS brochure/video discussed/provided to patient:  Yes  ED Medications:   Medications   nitroGLYcerin (NITROSTAT) sublingual tablet 0.4 mg (0.4 mg Sublingual Given 9/1/19 0039)     Drips infusing:  No  For the majority of the shift, the patient's behavior Green. Interventions performed were none.     Severe Sepsis OR Septic Shock Diagnosis Present: No      ED Nurse Name/Phone Number: Don  Jose RN,   1:45 AM  RECEIVING UNIT ED HANDOFF REVIEW    Above ED Nurse Handoff Report was reviewed: Yes  Reviewed by: Julianne Kitchen RN on September 1, 2019 at 1:51 AM

## 2019-09-01 NOTE — PROGRESS NOTES
PRIMARY DIAGNOSIS: CHEST PAIN  OUTPATIENT/OBSERVATION GOALS TO BE MET BEFORE DISCHARGE:  1. Ruled out acute coronary syndrome (negative or stable Troponin):  Yes  2. Pain Status: Pain free.  3. Appropriate provocative testing performed: Yes  - Stress Test Procedure: Echo  - Interpretation of cardiac rhythm per telemetry tech: SB/SR inverted T's PVC's.    4. Cleared by Consultants (if applicable):N/A  5. Return to near baseline physical activity: Yes  Patient alert and oriented.  Up independently.  Denies chest pain.  /55 (BP Location: Right arm)   Pulse 68   Temp 97.4  F (36.3  C) (Oral)   Resp 12   Wt 59 kg (130 lb 1.6 oz)   SpO2 97%   BMI 22.33 kg/m     Plan to discharge this morning.     Discharge Planner Nurse   Safe discharge environment identified: Yes  Barriers to discharge: No       Entered by: Gem Sanchez 09/01/2019 5:51 PM     Please review provider order for any additional goals.   Nurse to notify provider when observation goals have been met and patient is ready for discharge.

## 2019-09-01 NOTE — DISCHARGE SUMMARY
[facility]  Hospitalist Discharge Summary       Date of Admission:  9/1/2019  Date of Discharge:  9/1/2019  Discharging Provider: Holden Funk MD      Discharge Diagnoses   Chest pain, possibly coronary vasospasm    Follow-ups Needed After Discharge   Follow-up Appointments     Follow-up and recommended labs and tests       Follow-up with Dr. Mario as previously scheduled for Tuesday. Follow up   with primary care provider, Ana Smith, within 7 days for   hospital follow- up.  The following labs/tests are recommended: none.             Unresulted Labs Ordered in the Past 30 Days of this Admission     No orders found from 8/2/2019 to 9/2/2019.      These results will be followed up by NA    Discharge Disposition   Discharged to home  Condition at discharge: Stable    Hospital Course   Ania Castillo is a very delightful 69 year old female with a past medical history significant for myocardial infarction, hypertension who presents with episodes of chest discomfort that are responding to nitro.     Her cardiac history is notable for a myocardial infarction in 2017 when she was in [place].  It appears that she had LAD angioplasty at the time and no stent was placed.  She had an angiogram in 2012 and then again in 2017.  She was then really found to have significant obstructive disease that would require intervention.  The exact cause for her infarction remains unclear.  She had a follow-up cardiac MRI in 2017 showed ischemic cardiomyopathy and changes of MI.  No other obvious explanation.  It was thought that she might have had coronary vasospasm, coronary thromboembolism or spontaneous coronary dissection that had healed.     Patient tells me that she has been well recently.  Her last episode of angina was in April of this year where she took a couple of nitros and it resolved.  Since then she has had not had any more episodes.  However yesterday again she had episode of chest discomfort that  required multiple doses of nitroglycerin.  She then today around 5:00 in the evening had again left-sided discomfort with some radiation into her shoulder.  Mild shortness of breath.  No nausea, vomiting or diaphoresis.  She took two doses of nitro at home but the pain did not resolve.  She was given a third dose in the ER with complete resolution of her symptoms.  Currently she feels well without any symptoms.  Observation was requested for further evaluation.    Patient was admitted to the Obs Unit. Trops were negative. SHe had no further chest pain. Tele was negative. I discussed with her the details of her previous visit with Dr. Mario last year. In his note he clearly stated the plan would be to try amlodipine or Imdur if she had recurrent chest pain. I offered to start the Imdur here or she could wait to see Dr. Mario on Tuesday. SHe chose to start it now. I will fill a 15mg dose for her here. I will also email Dr. Mario so he is aware of her stay here. Family was in room.     Consultations This Hospital Stay   None    Code Status   Full Code    Time Spent on this Encounter   I, Holden Funk MD, personally saw the patient today and spent greater than 30 minutes discharging this patient.       Holden Funk MD  [facility]  ______________________________________________________________________    Physical Exam   Vital Signs: Temp: 97.4  F (36.3  C) Temp src: Oral BP: 115/55 Pulse: 68 Heart Rate: 55 Resp: 12 SpO2: 97 % O2 Device: None (Room air)    Weight: 130 lbs 1.6 oz  Constitutional: awake, alert, cooperative, no apparent distress, and appears stated age  Eyes: Lids and lashes normal, pupils equal, round and reactive to light, extra ocular muscles intact, sclera clear, conjunctiva normal  ENT: Normocephalic, without obvious abnormality, atraumatic, sinuses nontender on palpation, external ears without lesions, oral pharynx with moist mucous membranes, tonsils without erythema or exudates,  gums normal and good dentition.  Respiratory: No increased work of breathing, good air exchange, clear to auscultation bilaterally, no crackles or wheezing  Cardiovascular: Normal apical impulse, regular rate and rhythm, normal S1 and S2, no S3 or S4, and no murmur noted  GI: No scars, normal bowel sounds, soft, non-distended, non-tender, no masses palpated, no hepatosplenomegally  Skin: no bruising or bleeding  Musculoskeletal: no lower extremity pitting edema present       Primary Care Physician   Ana Smith    Discharge Orders      Reason for your hospital stay    Chest pain, similar to previous. Possibly due to vasospasm. Your work-up here was negative.     Follow-up and recommended labs and tests     Follow-up with Dr. Mario as previously scheduled for Tuesday. Follow up with primary care provider, Ana Smith, within 7 days for hospital follow- up.  The following labs/tests are recommended: none.     Activity    Your activity upon discharge: activity as tolerated     Diet    Follow this diet upon discharge: Orders Placed This Encounter      Combination Diet Regular Diet Adult       Significant Results and Procedures   Most Recent 3 CBC's:  Recent Labs   Lab Test 09/01/19  0028 09/25/17  1515 09/09/17  1242   WBC 6.6 5.6 7.7   HGB 12.1 11.7 13.1   MCV 89 88 88    283 310     Most Recent 3 BMP's:  Recent Labs   Lab Test 09/01/19  0028 09/06/18  0740 09/25/17  1515    139 141   POTASSIUM 3.5 4.1 3.6   CHLORIDE 106 103 105   CO2 30 27 29   BUN 14 14 13   CR 0.80 0.88 0.70   ANIONGAP 5 13.1 7   TAMIA 8.6 9.5 8.8   * 103 144*     Most Recent 2 LFT's:  Recent Labs   Lab Test 01/10/17  0909 11/10/14  0415   AST  --  14   ALT <5* 18   ALKPHOS  --  70   BILITOTAL  --  0.3     Most Recent 3 Troponin's:  Recent Labs   Lab Test 09/01/19  0712 09/01/19  0238 09/01/19  0041 09/01/19  0028 09/25/17  1516  11/10/14  0417   TROPI <0.015 <0.015  --  <0.015  --    < >  --    TROPONIN  --   --   0.00  --  0.00  --  0.02    < > = values in this interval not displayed.   ,   Results for orders placed or performed during the hospital encounter of 09/01/19   XR Chest 2 Views    Narrative    EXAM: XR CHEST 2 VW  LOCATION: [facility]  DATE/TIME: 9/1/2019 1:35 AM    INDICATION: Chest pain  COMPARISON: 09/25/2017    FINDINGS: Small hiatal hernia. Cardiac silhouette within normal limits. Stable mildly tortuous atherosclerotic aorta. No focal consolidation. No pneumothorax or pleural effusion. No acute osseous abnormality.       Discharge Medications   Current Discharge Medication List      START taking these medications    Details   isosorbide mononitrate (IMDUR) 30 MG 24 hr tablet Take 0.5 tablets (15 mg) by mouth daily  Qty: 30 tablet, Refills: 1    Associated Diagnoses: Coronary artery disease involving native coronary artery of native heart without angina pectoris         CONTINUE these medications which have NOT CHANGED    Details   acetaminophen (TYLENOL) 500 MG tablet Take 1,000 mg by mouth every 6 hours as needed      aspirin 81 MG EC tablet Take 81 mg by mouth daily.      BIOTIN PO Take 1 tablet by mouth daily 1000 mg      Cholecalciferol (VITAMIN D3) 1000 units CAPS 2 capsules daily      ezetimibe (ZETIA) 10 MG tablet Take 1 tablet (10 mg) by mouth daily  Qty: 90 tablet, Refills: 3    Associated Diagnoses: Coronary artery disease involving native coronary artery of native heart without angina pectoris      fish oil-omega-3 fatty acids (FISH OIL) 1000 MG capsule Take 1 g by mouth daily.      losartan (COZAAR) 25 MG tablet Take 1 tablet (25 mg) by mouth daily  Qty: 90 tablet, Refills: 3    Associated Diagnoses: Coronary artery disease involving native coronary artery of native heart without angina pectoris      metoprolol (TOPROL-XL) 25 MG 24 hr tablet Take 1 tablet (25 mg) by mouth daily  Qty: 90 tablet, Refills: 3    Associated Diagnoses: Coronary artery disease involving native coronary  artery of native heart without angina pectoris      nitroglycerin (NITROSTAT) 0.4 MG sublingual tablet Place 1 tablet (0.4 mg) under the tongue every 5 minutes as needed  Qty: 25 tablet, Refills: 0    Associated Diagnoses: Coronary artery disease involving native coronary artery of native heart without angina pectoris      NORTRIPTYLINE HCL PO Take 40 mg by mouth At Bedtime      omeprazole (PRILOSEC OTC) 20 MG tablet Take 20 mg by mouth daily       simvastatin (ZOCOR) 40 MG tablet Take 1 tablet (40 mg) by mouth At Bedtime  Qty: 90 tablet, Refills: 3    Associated Diagnoses: Coronary artery disease involving native coronary artery of native heart without angina pectoris           Allergies   Allergies   Allergen Reactions     Atorvastatin      myalgias     Rosuvastatin      myalgias
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
## 2019-09-01 NOTE — PROGRESS NOTES
ROOM # [id number]    Living Situation: house with 2 sons  Facility name:  : Carmela (daughter) [phone]    Activity level at baseline: Ind  Activity level on admit: Ind      Patient registered to observation; given Patient Bill of Rights; given the opportunity to ask questions about observation status and their plan of care.  Patient has been oriented to the observation room, bathroom and call light is in place.    Discussed discharge goals and expectations with patient/family.

## 2019-09-01 NOTE — PHARMACY-ADMISSION MEDICATION HISTORY
Admission medication history interview status for this patient is complete. See [facility] admission navigator for allergy information, prior to admission medications and immunization status.     Medication history interview source(s):Patient  Medication history resources (including written lists, pill bottles, clinic record):[facility] list  Primary pharmacy:[facility], [place]    Changes made to [facility] medication list:  Added: none  Deleted: none  Changed: none    Actions taken by pharmacist (provider contacted, etc):None     Additional medication history information:None    Medication reconciliation/reorder completed by provider prior to medication history? Yes    Do you take OTC medications (eg tylenol, ibuprofen, fish oil, eye/ear drops, etc)? Y    For patients on insulin therapy: N    Prior to Admission medications    Medication Sig Last Dose Taking? Auth Provider   acetaminophen (TYLENOL) 500 MG tablet Take 1,000 mg by mouth every 6 hours as needed 8/31/2019 at Unknown time Yes Unknown, Entered By History   aspirin 81 MG EC tablet Take 81 mg by mouth daily. 8/31/2019 at Unknown time Yes Unknown, Entered By History   ezetimibe (ZETIA) 10 MG tablet Take 1 tablet (10 mg) by mouth daily 8/31/2019 at Unknown time Yes August Mario MD   losartan (COZAAR) 25 MG tablet Take 1 tablet (25 mg) by mouth daily 8/31/2019 at Unknown time Yes August Mario MD   metoprolol (TOPROL-XL) 25 MG 24 hr tablet Take 1 tablet (25 mg) by mouth daily 8/31/2019 at Unknown time Yes August Mario MD   nitroglycerin (NITROSTAT) 0.4 MG sublingual tablet Place 1 tablet (0.4 mg) under the tongue every 5 minutes as needed 9/1/2019 at Unknown time Yes August Mario MD   NORTRIPTYLINE HCL PO Take 40 mg by mouth At Bedtime 8/31/2019 at Unknown time Yes Unknown, Entered By History   omeprazole (PRILOSEC OTC) 20 MG tablet Take 20 mg by mouth daily  8/31/2019 at Unknown time Yes Unknown, Entered By History   simvastatin (ZOCOR)  40 MG tablet Take 1 tablet (40 mg) by mouth At Bedtime 8/31/2019 at Unknown time Yes August Mario MD   BIOTIN PO Take 1 tablet by mouth daily 1000 mg More than a month at Unknown time  Unknown, Entered By History   Cholecalciferol (VITAMIN D3) 1000 units CAPS 2 capsules daily More than a month at Unknown time  Reported, Patient   fish oil-omega-3 fatty acids (FISH OIL) 1000 MG capsule Take 1 g by mouth daily. More than a month at Unknown time  Unknown, Entered By History

## 2019-09-01 NOTE — PLAN OF CARE
PRIMARY DIAGNOSIS: CHEST PAIN  OUTPATIENT/OBSERVATION GOALS TO BE MET BEFORE DISCHARGE:  1. Ruled out acute coronary syndrome (negative or stable Troponin):  Yes  2. Pain Status: Pain free.  3. Appropriate provocative testing performed: Yes  - Stress Test Procedure: Echo  - Interpretation of cardiac rhythm per telemetry tech: SR- 60s    4. Cleared by Consultants (if applicable):N/A  5. Return to near baseline physical activity: Yes  Discharge Planner Nurse   Safe discharge environment identified: Yes  Barriers to discharge: Yes       Entered by: Julianne Kitchen 09/01/2019 3:03 AM    VSS, pt is A&Ox4, up independently, denies pain at this time, ECHO scheduled for today, next trop in 4 hours, will continue to monitor        Please review provider order for any additional goals.   Nurse to notify provider when observation goals have been met and patient is ready for discharge.

## 2019-09-01 NOTE — PROGRESS NOTES
Patient's After Visit Summary was reviewed with patient and son.   Patient verbalized understanding of After Visit Summary, recommended follow up and was given an opportunity to ask questions.   Discharge medications sent home with patient/family: YES   Discharged with son.  Patient declined wheeled ride to exit.  Discharged with belongings.  Son here for transportation.

## 2019-09-01 NOTE — ED TRIAGE NOTES
Pt with c/o L sided CP. Took 2 nitroglycerin around 1730 which took away pain. Hx 2 MI. Pt took 81 mg ASA PTA. CP coming back. Some SOB. No nausea or diaphoresis. ABC intact.

## 2019-09-03 ENCOUNTER — OFFICE VISIT (OUTPATIENT)
Dept: CARDIOLOGY | Facility: CLINIC | Age: 69
End: 2019-09-03
Payer: MEDICARE

## 2019-09-03 VITALS
BODY MASS INDEX: 22.2 KG/M2 | HEART RATE: 64 BPM | WEIGHT: 130 LBS | SYSTOLIC BLOOD PRESSURE: 146 MMHG | HEIGHT: 64 IN | DIASTOLIC BLOOD PRESSURE: 86 MMHG

## 2019-09-03 DIAGNOSIS — I25.10 CORONARY ARTERY DISEASE INVOLVING NATIVE CORONARY ARTERY OF NATIVE HEART WITHOUT ANGINA PECTORIS: ICD-10-CM

## 2019-09-03 DIAGNOSIS — E78.00 PURE HYPERCHOLESTEROLEMIA: ICD-10-CM

## 2019-09-03 DIAGNOSIS — I25.42 SPONTANEOUS DISSECTION OF CORONARY ARTERY: ICD-10-CM

## 2019-09-03 DIAGNOSIS — I25.119 CORONARY ARTERY DISEASE INVOLVING NATIVE CORONARY ARTERY OF NATIVE HEART WITH ANGINA PECTORIS (H): Primary | ICD-10-CM

## 2019-09-03 LAB
ALT SERPL W P-5'-P-CCNC: <5 U/L (ref 5–30)
ANION GAP SERPL CALCULATED.3IONS-SCNC: 9.1 MMOL/L (ref 6–17)
BUN SERPL-MCNC: 11 MG/DL (ref 7–30)
CALCIUM SERPL-MCNC: 9.4 MG/DL (ref 8.5–10.5)
CHLORIDE SERPL-SCNC: 106 MMOL/L (ref 98–107)
CHOLEST SERPL-MCNC: 181 MG/DL
CO2 SERPL-SCNC: 28 MMOL/L (ref 23–29)
CREAT SERPL-MCNC: 0.78 MG/DL (ref 0.7–1.3)
GFR SERPL CREATININE-BSD FRML MDRD: 73 ML/MIN/{1.73_M2}
GLUCOSE SERPL-MCNC: 109 MG/DL (ref 70–105)
HDLC SERPL-MCNC: 57 MG/DL
INTERPRETATION ECG - MUSE: NORMAL
LDLC SERPL CALC-MCNC: 111 MG/DL
NONHDLC SERPL-MCNC: 124 MG/DL
POTASSIUM SERPL-SCNC: 4.1 MMOL/L (ref 3.5–5.1)
SODIUM SERPL-SCNC: 139 MMOL/L (ref 136–145)
TRIGL SERPL-MCNC: 65 MG/DL

## 2019-09-03 PROCEDURE — 36415 COLL VENOUS BLD VENIPUNCTURE: CPT | Performed by: INTERNAL MEDICINE

## 2019-09-03 PROCEDURE — 84460 ALANINE AMINO (ALT) (SGPT): CPT | Performed by: INTERNAL MEDICINE

## 2019-09-03 PROCEDURE — 80061 LIPID PANEL: CPT | Performed by: INTERNAL MEDICINE

## 2019-09-03 PROCEDURE — 99214 OFFICE O/P EST MOD 30 MIN: CPT | Performed by: INTERNAL MEDICINE

## 2019-09-03 PROCEDURE — 80048 BASIC METABOLIC PNL TOTAL CA: CPT | Performed by: INTERNAL MEDICINE

## 2019-09-03 ASSESSMENT — MIFFLIN-ST. JEOR: SCORE: 1099.68
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
## 2019-09-04 ENCOUNTER — TELEPHONE (OUTPATIENT)
Dept: CARDIOLOGY | Facility: CLINIC | Age: 69
End: 2019-09-04

## 2019-09-04 NOTE — TELEPHONE ENCOUNTER
Pt called in she did use Imdur 15 mg and has had a awful HA since , and using tylenol without any real relief. Pt will no longer take Imdur. Advised Pt to not take more than 3000 Mg of tylenol a day as per med scape Pt said she took tylenol 6 times yesterday, and twice today.  Pt did cancel Nuclear stress test and said she would reschedule. DESIREE Gaxiola RN

## 2019-09-04 NOTE — TELEPHONE ENCOUNTER
Headache should not persist beyond a day if Imdur discontinued. If still present tomorrow, should see PMD and also notify us.

## 2019-09-04 NOTE — TELEPHONE ENCOUNTER
Called Pt and informed her that HA typically does not go beyond 24 hrs. Asked Pt to call back to clinic if it persists, and still present tomorrow,and she will need to see PMD for further evaluation. DESIREE Gaxiola RN

## 2019-09-12 ENCOUNTER — TELEPHONE (OUTPATIENT)
Dept: CARDIOLOGY | Facility: CLINIC | Age: 69
End: 2019-09-12

## 2019-09-12 NOTE — TELEPHONE ENCOUNTER
Pt called in she has bad cold and HA. Pt says she also had colonoscopy yesterday??? Pt wants to switch her stress test to another day. Pt denies any CP, or heart issues today. Informed Pt she can speak with scheduling and change time. Did transfer her to scheduling. DESIREE Gaxiola RN

## 2019-09-20 ENCOUNTER — HOSPITAL ENCOUNTER (OUTPATIENT)
Dept: CARDIOLOGY | Facility: CLINIC | Age: 69
Discharge: HOME | End: 2019-09-20
Attending: INTERNAL MEDICINE | Admitting: INTERNAL MEDICINE
Payer: MEDICARE

## 2019-09-20 DIAGNOSIS — I25.119 CORONARY ARTERY DISEASE INVOLVING NATIVE CORONARY ARTERY OF NATIVE HEART WITH ANGINA PECTORIS (H): ICD-10-CM

## 2019-09-20 PROCEDURE — 34300033 ZZH RX 343: Performed by: INTERNAL MEDICINE

## 2019-09-20 PROCEDURE — A9502 TC99M TETROFOSMIN: HCPCS | Performed by: INTERNAL MEDICINE

## 2019-09-20 RX ADMIN — Medication 3.6 MCI.: at 14:02

## 2019-09-20 NOTE — PROGRESS NOTES
Patient here for exercise nuc stress test.  Patient was ordered to be done on beta-blocker per Dr. Mario, but patient held beta-blocker yesterday and today.  Dr. Mario did not return my page, so discussed with Dr. Thompson.  Patient was rescheduled to Monday and will continue to take her beta-blocker.  Ethel Valerio

## 2019-09-23 ENCOUNTER — TELEPHONE (OUTPATIENT)
Dept: CARDIOLOGY | Facility: CLINIC | Age: 69
End: 2019-09-23

## 2019-09-23 ENCOUNTER — HOSPITAL ENCOUNTER (OUTPATIENT)
Dept: CARDIOLOGY | Facility: CLINIC | Age: 69
Discharge: HOME | End: 2019-09-23
Attending: INTERNAL MEDICINE | Admitting: INTERNAL MEDICINE
Payer: MEDICARE

## 2019-09-23 DIAGNOSIS — I25.119 CORONARY ARTERY DISEASE INVOLVING NATIVE CORONARY ARTERY OF NATIVE HEART WITH ANGINA PECTORIS (H): ICD-10-CM

## 2019-09-23 PROCEDURE — 93016 CV STRESS TEST SUPVJ ONLY: CPT | Performed by: INTERNAL MEDICINE

## 2019-09-23 PROCEDURE — A9502 TC99M TETROFOSMIN: HCPCS | Performed by: INTERNAL MEDICINE

## 2019-09-23 PROCEDURE — 78452 HT MUSCLE IMAGE SPECT MULT: CPT | Mod: 26 | Performed by: INTERNAL MEDICINE

## 2019-09-23 PROCEDURE — 93018 CV STRESS TEST I&R ONLY: CPT | Performed by: INTERNAL MEDICINE

## 2019-09-23 PROCEDURE — 34300033 ZZH RX 343: Performed by: INTERNAL MEDICINE

## 2019-09-23 PROCEDURE — 93017 CV STRESS TEST TRACING ONLY: CPT

## 2019-09-23 RX ADMIN — Medication 9.16 MCI.: at 12:33

## 2019-09-23 NOTE — TELEPHONE ENCOUNTER
Impression  1.  Myocardial perfusion imaging using single isotope technique  demonstrated a small to moderately sized fixed apical defect  consistent with transmural myocardial infarction. There is no evidence  of significant residual ischemia.   2. Gated images demonstrated apical akinesis.  The left ventricular  systolic function is mildly reduced.  3. Compared to the prior study from 2016, no significant changes are  noted   T Gaxiola RN

## 2019-09-24 NOTE — TELEPHONE ENCOUNTER
Called Pt left message that Nuc no changes, and same findings as 2016. Left phone number for Pt if she has questions. DESIREE Gaxiola RN

## 2020-03-12 DIAGNOSIS — E78.00 PURE HYPERCHOLESTEROLEMIA: Primary | ICD-10-CM

## 2020-03-13 ENCOUNTER — OFFICE VISIT (OUTPATIENT)
Dept: CARDIOLOGY | Facility: CLINIC | Age: 70
End: 2020-03-13
Payer: MEDICARE

## 2020-03-13 VITALS
SYSTOLIC BLOOD PRESSURE: 145 MMHG | BODY MASS INDEX: 23.9 KG/M2 | HEART RATE: 71 BPM | HEIGHT: 64 IN | DIASTOLIC BLOOD PRESSURE: 84 MMHG | WEIGHT: 140 LBS

## 2020-03-13 DIAGNOSIS — I10 ESSENTIAL HYPERTENSION: ICD-10-CM

## 2020-03-13 DIAGNOSIS — I25.10 CORONARY ARTERY DISEASE INVOLVING NATIVE CORONARY ARTERY OF NATIVE HEART WITHOUT ANGINA PECTORIS: Primary | ICD-10-CM

## 2020-03-13 DIAGNOSIS — E78.00 PURE HYPERCHOLESTEROLEMIA: ICD-10-CM

## 2020-03-13 LAB
ALT SERPL W P-5'-P-CCNC: 19 U/L (ref 0–50)
CHOLEST SERPL-MCNC: 250 MG/DL
HDLC SERPL-MCNC: 71 MG/DL
LDLC SERPL CALC-MCNC: 164 MG/DL
NONHDLC SERPL-MCNC: 179 MG/DL
TRIGL SERPL-MCNC: 74 MG/DL

## 2020-03-13 PROCEDURE — 99214 OFFICE O/P EST MOD 30 MIN: CPT | Performed by: INTERNAL MEDICINE

## 2020-03-13 PROCEDURE — 84460 ALANINE AMINO (ALT) (SGPT): CPT | Performed by: INTERNAL MEDICINE

## 2020-03-13 PROCEDURE — 80061 LIPID PANEL: CPT | Performed by: INTERNAL MEDICINE

## 2020-03-13 PROCEDURE — 36415 COLL VENOUS BLD VENIPUNCTURE: CPT | Performed by: INTERNAL MEDICINE

## 2020-03-13 RX ORDER — LOSARTAN POTASSIUM 50 MG/1
50 TABLET ORAL DAILY
Qty: 90 TABLET | Refills: 3 | Status: SHIPPED | OUTPATIENT
Start: 2020-03-13 | End: 2023-06-27

## 2020-03-13 ASSESSMENT — MIFFLIN-ST. JEOR: SCORE: 1140.29
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
## 2020-04-17 ENCOUNTER — HOSPITAL ENCOUNTER (EMERGENCY)
Facility: CLINIC | Age: 70
Discharge: HOME | End: 2020-04-17
Attending: EMERGENCY MEDICINE | Admitting: EMERGENCY MEDICINE
Payer: MEDICARE

## 2020-04-17 ENCOUNTER — APPOINTMENT (OUTPATIENT)
Dept: MRI IMAGING | Facility: CLINIC | Age: 70
End: 2020-04-17
Attending: EMERGENCY MEDICINE
Payer: MEDICARE

## 2020-04-17 VITALS
BODY MASS INDEX: 23.64 KG/M2 | RESPIRATION RATE: 18 BRPM | SYSTOLIC BLOOD PRESSURE: 136 MMHG | DIASTOLIC BLOOD PRESSURE: 85 MMHG | HEART RATE: 83 BPM | TEMPERATURE: 98.1 F | OXYGEN SATURATION: 97 % | WEIGHT: 137.79 LBS

## 2020-04-17 DIAGNOSIS — H81.399 PERIPHERAL VERTIGO, UNSPECIFIED LATERALITY: ICD-10-CM

## 2020-04-17 LAB
ANION GAP SERPL CALCULATED.3IONS-SCNC: 3 MMOL/L (ref 3–14)
BASOPHILS # BLD AUTO: 0 10E9/L (ref 0–0.2)
BASOPHILS NFR BLD AUTO: 0.7 %
BUN SERPL-MCNC: 13 MG/DL (ref 7–30)
CALCIUM SERPL-MCNC: 9.1 MG/DL (ref 8.5–10.1)
CHLORIDE SERPL-SCNC: 105 MMOL/L (ref 94–109)
CO2 SERPL-SCNC: 29 MMOL/L (ref 20–32)
CREAT SERPL-MCNC: 0.74 MG/DL (ref 0.52–1.04)
DIFFERENTIAL METHOD BLD: NORMAL
EOSINOPHIL # BLD AUTO: 0.1 10E9/L (ref 0–0.7)
EOSINOPHIL NFR BLD AUTO: 2 %
ERYTHROCYTE [DISTWIDTH] IN BLOOD BY AUTOMATED COUNT: 12.8 % (ref 10–15)
GFR SERPL CREATININE-BSD FRML MDRD: 82 ML/MIN/{1.73_M2}
GLUCOSE SERPL-MCNC: 106 MG/DL (ref 70–99)
HCT VFR BLD AUTO: 40.8 % (ref 35–47)
HGB BLD-MCNC: 12.9 G/DL (ref 11.7–15.7)
IMM GRANULOCYTES # BLD: 0 10E9/L (ref 0–0.4)
IMM GRANULOCYTES NFR BLD: 0.4 %
LYMPHOCYTES # BLD AUTO: 1.5 10E9/L (ref 0.8–5.3)
LYMPHOCYTES NFR BLD AUTO: 28 %
MAGNESIUM SERPL-MCNC: 2.4 MG/DL (ref 1.6–2.3)
MCH RBC QN AUTO: 28.3 PG (ref 26.5–33)
MCHC RBC AUTO-ENTMCNC: 31.6 G/DL (ref 31.5–36.5)
MCV RBC AUTO: 90 FL (ref 78–100)
MONOCYTES # BLD AUTO: 0.4 10E9/L (ref 0–1.3)
MONOCYTES NFR BLD AUTO: 7 %
NEUTROPHILS # BLD AUTO: 3.4 10E9/L (ref 1.6–8.3)
NEUTROPHILS NFR BLD AUTO: 61.9 %
NRBC # BLD AUTO: 0 10*3/UL
NRBC BLD AUTO-RTO: 0 /100
PLATELET # BLD AUTO: 288 10E9/L (ref 150–450)
POTASSIUM SERPL-SCNC: 4.1 MMOL/L (ref 3.4–5.3)
RBC # BLD AUTO: 4.56 10E12/L (ref 3.8–5.2)
SODIUM SERPL-SCNC: 137 MMOL/L (ref 133–144)
WBC # BLD AUTO: 5.4 10E9/L (ref 4–11)

## 2020-04-17 PROCEDURE — 80048 BASIC METABOLIC PNL TOTAL CA: CPT | Performed by: EMERGENCY MEDICINE

## 2020-04-17 PROCEDURE — 96374 THER/PROPH/DIAG INJ IV PUSH: CPT | Mod: 59

## 2020-04-17 PROCEDURE — 25500064 ZZH RX 255 OP 636: Performed by: EMERGENCY MEDICINE

## 2020-04-17 PROCEDURE — 25000128 H RX IP 250 OP 636: Performed by: EMERGENCY MEDICINE

## 2020-04-17 PROCEDURE — A9585 GADOBUTROL INJECTION: HCPCS | Performed by: EMERGENCY MEDICINE

## 2020-04-17 PROCEDURE — 70553 MRI BRAIN STEM W/O & W/DYE: CPT

## 2020-04-17 PROCEDURE — 93005 ELECTROCARDIOGRAM TRACING: CPT

## 2020-04-17 PROCEDURE — 85025 COMPLETE CBC W/AUTO DIFF WBC: CPT | Performed by: EMERGENCY MEDICINE

## 2020-04-17 PROCEDURE — 99285 EMERGENCY DEPT VISIT HI MDM: CPT | Mod: 25

## 2020-04-17 PROCEDURE — 83735 ASSAY OF MAGNESIUM: CPT | Performed by: EMERGENCY MEDICINE

## 2020-04-17 RX ORDER — LORAZEPAM 2 MG/ML
0.5 INJECTION INTRAMUSCULAR ONCE
Status: COMPLETED | OUTPATIENT
Start: 2020-04-17 | End: 2020-04-17

## 2020-04-17 RX ORDER — GADOBUTROL 604.72 MG/ML
7.5 INJECTION INTRAVENOUS ONCE
Status: COMPLETED | OUTPATIENT
Start: 2020-04-17 | End: 2020-04-17

## 2020-04-17 RX ORDER — MECLIZINE HYDROCHLORIDE 25 MG/1
25 TABLET ORAL 3 TIMES DAILY PRN
Qty: 10 TABLET | Refills: 0 | Status: SHIPPED | OUTPATIENT
Start: 2020-04-17 | End: 2024-04-30

## 2020-04-17 RX ADMIN — GADOBUTROL 6.5 ML: 604.72 INJECTION INTRAVENOUS at 16:38

## 2020-04-17 RX ADMIN — LORAZEPAM 0.5 MG: 2 INJECTION INTRAMUSCULAR; INTRAVENOUS at 16:22

## 2020-04-17 ASSESSMENT — ENCOUNTER SYMPTOMS
VOMITING: 0
SHORTNESS OF BREATH: 0
DIZZINESS: 1
NAUSEA: 1
CHEST TIGHTNESS: 0
HEADACHES: 0

## 2020-04-17 NOTE — ED AVS SNAPSHOT
[facility] Emergency Department  [address]  [facility] [address]  Phone:  [phone]  Fax:  [phone]                                    Ania Castillo   MRN: [id number]    Department:  [facility] Emergency Department   Date of Visit:  4/17/2020           After Visit Summary Signature Page    I have received my discharge instructions, and my questions have been answered. I have discussed any challenges I see with this plan with the nurse or doctor.    ..........................................................................................................................................  Patient/Patient Representative Signature      ..........................................................................................................................................  Patient Representative Print Name and Relationship to Patient    ..................................................               ................................................  Date                                   Time    ..........................................................................................................................................  Reviewed by Signature/Title    ...................................................              ..............................................  Date                                               Time          22EPIC Rev 08/18

## 2020-04-17 NOTE — ED TRIAGE NOTES
Woke with dizziness yesterday after a nap, felt a little better last night.  Today dizziness all day.  Called clinic, was told to come to ED.  ABCDs intact.
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
## 2020-04-19 LAB — INTERPRETATION ECG - MUSE: NORMAL

## 2020-04-22 DIAGNOSIS — I25.10 CORONARY ARTERY DISEASE INVOLVING NATIVE CORONARY ARTERY OF NATIVE HEART WITHOUT ANGINA PECTORIS: ICD-10-CM

## 2020-04-22 RX ORDER — EZETIMIBE 10 MG/1
10 TABLET ORAL DAILY
Qty: 90 TABLET | Refills: 1 | Status: SHIPPED | OUTPATIENT
Start: 2020-04-22 | End: 2021-05-26

## 2020-06-17 DIAGNOSIS — I25.10 CORONARY ARTERY DISEASE INVOLVING NATIVE CORONARY ARTERY OF NATIVE HEART WITHOUT ANGINA PECTORIS: ICD-10-CM

## 2020-06-17 RX ORDER — SIMVASTATIN 40 MG
40 TABLET ORAL AT BEDTIME
Qty: 90 TABLET | Refills: 3 | Status: SHIPPED | OUTPATIENT
Start: 2020-06-17 | End: 2023-06-27

## 2020-09-16 DIAGNOSIS — K21.9 GASTROESOPHAGEAL REFLUX DISEASE WITHOUT ESOPHAGITIS: Primary | ICD-10-CM

## 2020-09-16 RX ORDER — NORTRIPTYLINE HCL 10 MG
40 CAPSULE ORAL AT BEDTIME
Qty: 120 CAPSULE | Refills: 0 | Status: SHIPPED | OUTPATIENT
Start: 2020-09-16 | End: 2020-09-21

## 2020-09-16 RX ORDER — OMEPRAZOLE 20 MG/1
20 TABLET, DELAYED RELEASE ORAL DAILY
Qty: 30 TABLET | Refills: 0 | Status: SHIPPED | OUTPATIENT
Start: 2020-09-16 | End: 2020-09-21

## 2020-09-21 DIAGNOSIS — K21.9 GASTROESOPHAGEAL REFLUX DISEASE WITHOUT ESOPHAGITIS: ICD-10-CM

## 2020-09-21 RX ORDER — NORTRIPTYLINE HCL 10 MG
40 CAPSULE ORAL AT BEDTIME
Qty: 120 CAPSULE | Refills: 0 | Status: SHIPPED | OUTPATIENT
Start: 2020-09-21 | End: 2024-04-30

## 2020-09-21 RX ORDER — OMEPRAZOLE 20 MG/1
20 TABLET, DELAYED RELEASE ORAL DAILY
Qty: 30 TABLET | Refills: 0 | Status: SHIPPED | OUTPATIENT
Start: 2020-09-21 | End: 2024-06-06

## 2020-09-21 NOTE — TELEPHONE ENCOUNTER
Called Pt and informed her did refill 2 medications, and asked that she get PMD in next month. DESIREE Gaxiola RN   Left Deviation

## 2020-10-19 LAB
ALT SERPL W P-5'-P-CCNC: 20 U/L (ref 0–50)
ANION GAP SERPL CALCULATED.3IONS-SCNC: 3 MMOL/L (ref 3–14)
BUN SERPL-MCNC: 14 MG/DL (ref 7–30)
CALCIUM SERPL-MCNC: 9.1 MG/DL (ref 8.5–10.1)
CHLORIDE SERPL-SCNC: 106 MMOL/L (ref 94–109)
CHOLEST SERPL-MCNC: 136 MG/DL
CO2 SERPL-SCNC: 29 MMOL/L (ref 20–32)
CREAT SERPL-MCNC: 0.76 MG/DL (ref 0.52–1.04)
GFR SERPL CREATININE-BSD FRML MDRD: 79 ML/MIN/{1.73_M2}
GLUCOSE SERPL-MCNC: 117 MG/DL (ref 70–99)
HDLC SERPL-MCNC: 60 MG/DL
LDLC SERPL CALC-MCNC: 64 MG/DL
NONHDLC SERPL-MCNC: 76 MG/DL
POTASSIUM SERPL-SCNC: 4.1 MMOL/L (ref 3.4–5.3)
SODIUM SERPL-SCNC: 138 MMOL/L (ref 133–144)
TRIGL SERPL-MCNC: 61 MG/DL

## 2020-10-19 PROCEDURE — 80061 LIPID PANEL: CPT | Performed by: INTERNAL MEDICINE

## 2020-10-19 PROCEDURE — 36415 COLL VENOUS BLD VENIPUNCTURE: CPT | Performed by: INTERNAL MEDICINE

## 2020-10-19 PROCEDURE — 80048 BASIC METABOLIC PNL TOTAL CA: CPT | Performed by: INTERNAL MEDICINE

## 2020-10-19 PROCEDURE — 84460 ALANINE AMINO (ALT) (SGPT): CPT | Performed by: INTERNAL MEDICINE

## 2020-11-06 ENCOUNTER — OFFICE VISIT (OUTPATIENT)
Dept: CARDIOLOGY | Facility: CLINIC | Age: 70
End: 2020-11-06
Payer: MEDICARE

## 2020-11-06 VITALS
WEIGHT: 133.3 LBS | SYSTOLIC BLOOD PRESSURE: 111 MMHG | BODY MASS INDEX: 22.76 KG/M2 | HEIGHT: 64 IN | HEART RATE: 75 BPM | DIASTOLIC BLOOD PRESSURE: 75 MMHG

## 2020-11-06 DIAGNOSIS — I25.42 SPONTANEOUS DISSECTION OF CORONARY ARTERY: Primary | ICD-10-CM

## 2020-11-06 DIAGNOSIS — K44.9 HIATAL HERNIA: ICD-10-CM

## 2020-11-06 DIAGNOSIS — E11.9 TYPE 2 DIABETES MELLITUS WITHOUT COMPLICATION, WITHOUT LONG-TERM CURRENT USE OF INSULIN (H): ICD-10-CM

## 2020-11-06 DIAGNOSIS — I25.2 OLD ANTERIOR MYOCARDIAL INFARCTION: ICD-10-CM

## 2020-11-06 DIAGNOSIS — I10 ESSENTIAL HYPERTENSION: ICD-10-CM

## 2020-11-06 DIAGNOSIS — K25.7 CHRONIC GASTRIC EROSION: ICD-10-CM

## 2020-11-06 DIAGNOSIS — E78.00 PURE HYPERCHOLESTEROLEMIA: ICD-10-CM

## 2020-11-06 DIAGNOSIS — I25.119 CORONARY ARTERY DISEASE INVOLVING NATIVE CORONARY ARTERY OF NATIVE HEART WITH ANGINA PECTORIS (H): ICD-10-CM

## 2020-11-06 PROCEDURE — 99214 OFFICE O/P EST MOD 30 MIN: CPT | Performed by: INTERNAL MEDICINE

## 2020-11-06 ASSESSMENT — MIFFLIN-ST. JEOR: SCORE: 1109.64
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
## 2020-11-12 DIAGNOSIS — I25.10 CORONARY ARTERY DISEASE INVOLVING NATIVE CORONARY ARTERY OF NATIVE HEART WITHOUT ANGINA PECTORIS: ICD-10-CM

## 2020-11-12 RX ORDER — METOPROLOL SUCCINATE 25 MG/1
25 TABLET, EXTENDED RELEASE ORAL DAILY
Qty: 90 TABLET | Refills: 3 | Status: SHIPPED | OUTPATIENT
Start: 2020-11-12 | End: 2023-06-27

## 2021-05-04 ENCOUNTER — HOSPITAL ENCOUNTER (OUTPATIENT)
Dept: CARDIOLOGY | Facility: CLINIC | Age: 71
Discharge: HOME | End: 2021-05-04
Attending: INTERNAL MEDICINE | Admitting: INTERNAL MEDICINE
Payer: MEDICARE

## 2021-05-04 DIAGNOSIS — I25.119 CORONARY ARTERY DISEASE INVOLVING NATIVE CORONARY ARTERY OF NATIVE HEART WITH ANGINA PECTORIS (H): ICD-10-CM

## 2021-05-04 DIAGNOSIS — I21.4 NSTEMI (NON-ST ELEVATED MYOCARDIAL INFARCTION) (H): Primary | ICD-10-CM

## 2021-05-04 DIAGNOSIS — I25.2 OLD ANTERIOR MYOCARDIAL INFARCTION: ICD-10-CM

## 2021-05-04 LAB
ALT SERPL W P-5'-P-CCNC: 19 U/L (ref 0–50)
CHOLEST SERPL-MCNC: 158 MG/DL
HDLC SERPL-MCNC: 62 MG/DL
LDLC SERPL CALC-MCNC: 87 MG/DL
NONHDLC SERPL-MCNC: 96 MG/DL
TRIGL SERPL-MCNC: 46 MG/DL

## 2021-05-04 PROCEDURE — 36415 COLL VENOUS BLD VENIPUNCTURE: CPT | Performed by: INTERNAL MEDICINE

## 2021-05-04 PROCEDURE — 255N000002 HC RX 255 OP 636: Performed by: INTERNAL MEDICINE

## 2021-05-04 PROCEDURE — 84460 ALANINE AMINO (ALT) (SGPT): CPT | Performed by: INTERNAL MEDICINE

## 2021-05-04 PROCEDURE — 80061 LIPID PANEL: CPT | Performed by: INTERNAL MEDICINE

## 2021-05-04 PROCEDURE — 999N000208 ECHOCARDIOGRAM COMPLETE

## 2021-05-04 PROCEDURE — 93306 TTE W/DOPPLER COMPLETE: CPT | Mod: 26 | Performed by: INTERNAL MEDICINE

## 2021-05-04 RX ADMIN — HUMAN ALBUMIN MICROSPHERES AND PERFLUTREN 9 ML: 10; .22 INJECTION, SOLUTION INTRAVENOUS at 15:40

## 2021-05-06 ENCOUNTER — OFFICE VISIT (OUTPATIENT)
Dept: CARDIOLOGY | Facility: CLINIC | Age: 71
End: 2021-05-06
Attending: INTERNAL MEDICINE
Payer: MEDICARE

## 2021-05-06 VITALS
BODY MASS INDEX: 25.45 KG/M2 | HEART RATE: 64 BPM | DIASTOLIC BLOOD PRESSURE: 75 MMHG | SYSTOLIC BLOOD PRESSURE: 138 MMHG | WEIGHT: 134.8 LBS | HEIGHT: 61 IN

## 2021-05-06 DIAGNOSIS — E11.9 TYPE 2 DIABETES MELLITUS WITHOUT COMPLICATION, WITHOUT LONG-TERM CURRENT USE OF INSULIN (H): ICD-10-CM

## 2021-05-06 DIAGNOSIS — E78.00 PURE HYPERCHOLESTEROLEMIA: ICD-10-CM

## 2021-05-06 DIAGNOSIS — I25.2 OLD ANTERIOR MYOCARDIAL INFARCTION: ICD-10-CM

## 2021-05-06 DIAGNOSIS — I10 ESSENTIAL HYPERTENSION: ICD-10-CM

## 2021-05-06 DIAGNOSIS — I25.119 CORONARY ARTERY DISEASE INVOLVING NATIVE CORONARY ARTERY OF NATIVE HEART WITH ANGINA PECTORIS (H): Primary | ICD-10-CM

## 2021-05-06 PROCEDURE — 99213 OFFICE O/P EST LOW 20 MIN: CPT | Performed by: INTERNAL MEDICINE

## 2021-05-06 ASSESSMENT — MIFFLIN-ST. JEOR: SCORE: 1063.83
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
## 2021-05-26 DIAGNOSIS — I25.10 CORONARY ARTERY DISEASE INVOLVING NATIVE CORONARY ARTERY OF NATIVE HEART WITHOUT ANGINA PECTORIS: ICD-10-CM

## 2021-05-26 RX ORDER — EZETIMIBE 10 MG/1
10 TABLET ORAL DAILY
Qty: 90 TABLET | Refills: 3 | Status: SHIPPED | OUTPATIENT
Start: 2021-05-26 | End: 2023-06-27

## 2021-09-07 ENCOUNTER — TELEPHONE (OUTPATIENT)
Dept: CARDIOLOGY | Facility: CLINIC | Age: 71
End: 2021-09-07

## 2022-03-22 ENCOUNTER — TELEPHONE (OUTPATIENT)
Dept: CARDIOLOGY | Facility: CLINIC | Age: 72
End: 2022-03-22
Payer: COMMERCIAL

## 2022-03-22 DIAGNOSIS — I25.10 CORONARY ARTERY DISEASE INVOLVING NATIVE CORONARY ARTERY OF NATIVE HEART, UNSPECIFIED WHETHER ANGINA PRESENT: Primary | ICD-10-CM

## 2022-03-22 NOTE — TELEPHONE ENCOUNTER
Called pt - will order BMP and Lipid profile with OV with Dr. Mario in May 2022  Pt understands and will call scheduling back to schedule

## 2022-03-22 NOTE — TELEPHONE ENCOUNTER
[facility] Call Center    Phone Message    May a detailed message be left on voicemail: yes     Reason for Call: Other: patient called today looking to [facility] an appt with Dr. Mario and is stating that she gets lab done same day but at [facility] we didn't see orders for lab work and per patient wanted to checkin with the care team to see if patient is neediing blood work done before seeing provider, please advise      Action Taken: Message routed to:  [facility] ([facility]): clinical pool     Travel Screening: Not Applicable

## 2022-03-24 ENCOUNTER — TELEPHONE (OUTPATIENT)
Dept: CARDIOLOGY | Facility: CLINIC | Age: 72
End: 2022-03-24
Payer: COMMERCIAL

## 2022-03-24 NOTE — TELEPHONE ENCOUNTER
[facility] Call [facility]    Phone Message    May a detailed message be left on voicemail: yes     Reason for Call: Other: Ania called to schedule her appt with Dr. Mario.  She states she is only available Tuesdays prior to 11am.  This writer also offered her Mondays at her request and found one at 10:15am in July.  Pt declined as she is wanting first thing in the morning.  There are no appointments available to meet her requests.  She is wanting the Manager to call her as she stated that the Manager will get her scheduled.  Please call her back at your earliest convenience to discuss options.    Action Taken: Message routed to:  Other: Cardiology    Travel Screening: Not Applicable

## 2022-03-24 NOTE — TELEPHONE ENCOUNTER
Forwarded message from patient to scheduling supervisor for help finding appointment time with Dr. Mario

## 2022-03-28 ENCOUNTER — TELEPHONE (OUTPATIENT)
Dept: CARDIOLOGY | Facility: CLINIC | Age: 72
End: 2022-03-28
Payer: COMMERCIAL

## 2022-03-28 NOTE — TELEPHONE ENCOUNTER
[facility] Call Center    Phone Message    May a detailed message be left on voicemail: yes     Reason for Call: Other: Ania called to advise Dr Mario she had seen a provider for knee pain and the provider prescribed Rx Celecoxib 1 tab daily as needed for pain. she would like clarification this is safe for her to take with her cardiac condition. Please reach out to her at [phone].     Action Taken: Other: [facility]    Travel Screening: Not Applicable

## 2022-04-04 NOTE — TELEPHONE ENCOUNTER
Called Pt left message on private voice mail per DR Mario Medication Celecoxib can cause high BP and also effect kidneys with long term use please speak with prescribing provider and follow BP closely. DESIREE Gaxiola RN

## 2022-04-04 NOTE — TELEPHONE ENCOUNTER
This medication can cause high BP sometimes and can also affect kidneys if used for long time. She should discuss side effects with prescribing provider and follow her BP closely

## 2022-04-26 ENCOUNTER — LAB (OUTPATIENT)
Dept: LAB | Facility: CLINIC | Age: 72
End: 2022-04-26
Payer: MEDICARE

## 2022-04-26 ENCOUNTER — OFFICE VISIT (OUTPATIENT)
Dept: CARDIOLOGY | Facility: CLINIC | Age: 72
End: 2022-04-26
Payer: MEDICARE

## 2022-04-26 VITALS
HEIGHT: 64 IN | SYSTOLIC BLOOD PRESSURE: 107 MMHG | HEART RATE: 76 BPM | DIASTOLIC BLOOD PRESSURE: 67 MMHG | BODY MASS INDEX: 20.98 KG/M2 | WEIGHT: 122.9 LBS

## 2022-04-26 DIAGNOSIS — E11.9 TYPE 2 DIABETES MELLITUS WITHOUT COMPLICATION, WITHOUT LONG-TERM CURRENT USE OF INSULIN (H): ICD-10-CM

## 2022-04-26 DIAGNOSIS — I25.119 CORONARY ARTERY DISEASE INVOLVING NATIVE CORONARY ARTERY OF NATIVE HEART WITH ANGINA PECTORIS (H): Primary | ICD-10-CM

## 2022-04-26 DIAGNOSIS — I25.10 CORONARY ARTERY DISEASE INVOLVING NATIVE CORONARY ARTERY OF NATIVE HEART, UNSPECIFIED WHETHER ANGINA PRESENT: ICD-10-CM

## 2022-04-26 DIAGNOSIS — E78.00 PURE HYPERCHOLESTEROLEMIA: ICD-10-CM

## 2022-04-26 DIAGNOSIS — I10 ESSENTIAL HYPERTENSION: ICD-10-CM

## 2022-04-26 LAB
ANION GAP SERPL CALCULATED.3IONS-SCNC: 1 MMOL/L (ref 3–14)
BUN SERPL-MCNC: 14 MG/DL (ref 7–30)
CALCIUM SERPL-MCNC: 9.1 MG/DL (ref 8.5–10.1)
CHLORIDE BLD-SCNC: 107 MMOL/L (ref 94–109)
CHOLEST SERPL-MCNC: 196 MG/DL
CO2 SERPL-SCNC: 30 MMOL/L (ref 20–32)
CREAT SERPL-MCNC: 0.77 MG/DL (ref 0.52–1.04)
FASTING STATUS PATIENT QL REPORTED: YES
GFR SERPL CREATININE-BSD FRML MDRD: 82 ML/MIN/1.73M2
GLUCOSE BLD-MCNC: 105 MG/DL (ref 70–99)
HDLC SERPL-MCNC: 65 MG/DL
LDLC SERPL CALC-MCNC: 117 MG/DL
NONHDLC SERPL-MCNC: 131 MG/DL
POTASSIUM BLD-SCNC: 4.2 MMOL/L (ref 3.4–5.3)
SODIUM SERPL-SCNC: 138 MMOL/L (ref 133–144)
TRIGL SERPL-MCNC: 68 MG/DL

## 2022-04-26 PROCEDURE — 80048 BASIC METABOLIC PNL TOTAL CA: CPT | Performed by: INTERNAL MEDICINE

## 2022-04-26 PROCEDURE — 99213 OFFICE O/P EST LOW 20 MIN: CPT | Performed by: INTERNAL MEDICINE

## 2022-04-26 PROCEDURE — 36415 COLL VENOUS BLD VENIPUNCTURE: CPT | Performed by: INTERNAL MEDICINE

## 2022-04-26 PROCEDURE — 80061 LIPID PANEL: CPT | Performed by: INTERNAL MEDICINE
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
## 2023-03-01 ENCOUNTER — TELEPHONE (OUTPATIENT)
Dept: CARDIOLOGY | Facility: CLINIC | Age: 73
End: 2023-03-01
Payer: MEDICARE

## 2023-03-01 NOTE — TELEPHONE ENCOUNTER
Called Pt left message and phone number for further discussion. Per scheduling no earlier openings. DESIREE king RN

## 2023-03-01 NOTE — TELEPHONE ENCOUNTER
[facility] Call Center    Phone Message    May a detailed message be left on voicemail: yes     Reason for Call: Other: Patient called expressing frustration about having to wait until 6/2023 to see Dr. Mario. I did explain that she was placed on a wait list as well. Patient is very persistent and would like to speak with Betsy about her concerns.     Action Taken: Other: cardiology    Travel Screening: Not Applicable   Thank you!  [facility]

## 2023-03-06 ENCOUNTER — TELEPHONE (OUTPATIENT)
Dept: CARDIOLOGY | Facility: CLINIC | Age: 73
End: 2023-03-06

## 2023-03-06 DIAGNOSIS — E78.00 PURE HYPERCHOLESTEROLEMIA: Primary | ICD-10-CM

## 2023-06-06 ENCOUNTER — TELEPHONE (OUTPATIENT)
Dept: CARDIOLOGY | Facility: CLINIC | Age: 73
End: 2023-06-06
Payer: MEDICARE

## 2023-06-06 NOTE — TELEPHONE ENCOUNTER
Returned call to Pt voice mail full left SMS phone number only for return call. DESIREE Gaxiola RN

## 2023-06-06 NOTE — TELEPHONE ENCOUNTER
[facility] Call Center    Phone Message    May a detailed message be left on voicemail: yes     Reason for Call: Other: Patient missed her appointment with Dr. Mario and called back to re-schedule. I offered the patient the first available appt in August , October, November. Patient states she cannot wait this long, she insists to send a message to Dr. Mario and he will work her in. Please call the patient to discuss.     Action Taken: Other: cardiology    Travel Screening: Not Applicable   Thank you!  [facility]

## 2023-06-07 NOTE — TELEPHONE ENCOUNTER
Pt informed scheduling will call her. DR Mario will see her when returns to clinic in 3 weeks. DESIREE Gaxiola rN

## 2023-06-27 ENCOUNTER — LAB (OUTPATIENT)
Dept: LAB | Facility: CLINIC | Age: 73
End: 2023-06-27
Payer: MEDICARE

## 2023-06-27 ENCOUNTER — OFFICE VISIT (OUTPATIENT)
Dept: CARDIOLOGY | Facility: CLINIC | Age: 73
End: 2023-06-27
Payer: MEDICARE

## 2023-06-27 VITALS
BODY MASS INDEX: 21.36 KG/M2 | SYSTOLIC BLOOD PRESSURE: 138 MMHG | WEIGHT: 125.1 LBS | DIASTOLIC BLOOD PRESSURE: 76 MMHG | HEART RATE: 69 BPM | HEIGHT: 64 IN

## 2023-06-27 DIAGNOSIS — I10 ESSENTIAL HYPERTENSION: ICD-10-CM

## 2023-06-27 DIAGNOSIS — K44.9 HIATAL HERNIA: ICD-10-CM

## 2023-06-27 DIAGNOSIS — I25.2 OLD ANTERIOR MYOCARDIAL INFARCTION: ICD-10-CM

## 2023-06-27 DIAGNOSIS — E78.00 PURE HYPERCHOLESTEROLEMIA: ICD-10-CM

## 2023-06-27 DIAGNOSIS — E11.9 TYPE 2 DIABETES MELLITUS WITHOUT COMPLICATION, WITHOUT LONG-TERM CURRENT USE OF INSULIN (H): ICD-10-CM

## 2023-06-27 DIAGNOSIS — I25.10 CORONARY ARTERY DISEASE INVOLVING NATIVE CORONARY ARTERY OF NATIVE HEART WITHOUT ANGINA PECTORIS: Primary | ICD-10-CM

## 2023-06-27 LAB
ALT SERPL W P-5'-P-CCNC: 13 U/L (ref 0–50)
CHOLEST SERPL-MCNC: 185 MG/DL
HDLC SERPL-MCNC: 70 MG/DL
LDLC SERPL CALC-MCNC: 103 MG/DL
NONHDLC SERPL-MCNC: 115 MG/DL
TRIGL SERPL-MCNC: 58 MG/DL

## 2023-06-27 PROCEDURE — 99214 OFFICE O/P EST MOD 30 MIN: CPT | Performed by: INTERNAL MEDICINE

## 2023-06-27 PROCEDURE — 84460 ALANINE AMINO (ALT) (SGPT): CPT | Performed by: INTERNAL MEDICINE

## 2023-06-27 PROCEDURE — 80061 LIPID PANEL: CPT | Performed by: INTERNAL MEDICINE

## 2023-06-27 PROCEDURE — 36415 COLL VENOUS BLD VENIPUNCTURE: CPT | Performed by: INTERNAL MEDICINE

## 2023-06-27 RX ORDER — METOPROLOL SUCCINATE 25 MG/1
25 TABLET, EXTENDED RELEASE ORAL DAILY
Qty: 90 TABLET | Refills: 3 | Status: SHIPPED | OUTPATIENT
Start: 2023-06-27 | End: 2024-04-30

## 2023-06-27 RX ORDER — NITROGLYCERIN 0.4 MG/1
0.4 TABLET SUBLINGUAL EVERY 5 MIN PRN
Qty: 25 TABLET | Refills: 3 | Status: SHIPPED | OUTPATIENT
Start: 2023-06-27

## 2023-06-27 RX ORDER — SIMVASTATIN 40 MG
40 TABLET ORAL AT BEDTIME
Qty: 90 TABLET | Refills: 3 | Status: SHIPPED | OUTPATIENT
Start: 2023-06-27 | End: 2024-04-30

## 2023-06-27 RX ORDER — LOSARTAN POTASSIUM 50 MG/1
50 TABLET ORAL DAILY
Qty: 90 TABLET | Refills: 3 | Status: ON HOLD | OUTPATIENT
Start: 2023-06-27 | End: 2024-05-05

## 2023-06-27 RX ORDER — EZETIMIBE 10 MG/1
10 TABLET ORAL DAILY
Qty: 90 TABLET | Refills: 3 | Status: SHIPPED | OUTPATIENT
Start: 2023-06-27 | End: 2024-05-22
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
## 2023-06-28 ENCOUNTER — TELEPHONE (OUTPATIENT)
Dept: CARDIOLOGY | Facility: CLINIC | Age: 73
End: 2023-06-28
Payer: MEDICARE

## 2023-06-28 NOTE — TELEPHONE ENCOUNTER
LDL close to goal of less than 100. Continue low fat diet and present meds. She had intolerance to other statins

## 2023-06-28 NOTE — TELEPHONE ENCOUNTER
Called Pt left voice mail labs stable , continue low fat diet and exercise, no medication changes. DESIREE Gaxiola RN

## 2023-06-28 NOTE — TELEPHONE ENCOUNTER
Labs post Office visit:    Component      Latest Ref Rng 6/27/2023  12:04 PM   Cholesterol      <200 mg/dL 185    Triglycerides      <150 mg/dL 58    HDL Cholesterol      >=50 mg/dL 70    LDL Cholesterol Calculated      <=100 mg/dL 103 (H)    Non HDL Cholesterol      <130 mg/dL 115       ALT -13.    T Khalida YIP

## 2023-12-27 ENCOUNTER — TELEPHONE (OUTPATIENT)
Dept: CARDIOLOGY | Facility: CLINIC | Age: 73
End: 2023-12-27
Payer: MEDICARE

## 2023-12-27 ENCOUNTER — HOSPITAL ENCOUNTER (EMERGENCY)
Facility: CLINIC | Age: 73
Discharge: HOME | End: 2023-12-27
Attending: EMERGENCY MEDICINE | Admitting: EMERGENCY MEDICINE
Payer: MEDICARE

## 2023-12-27 VITALS
TEMPERATURE: 98.1 F | OXYGEN SATURATION: 99 % | RESPIRATION RATE: 20 BRPM | SYSTOLIC BLOOD PRESSURE: 137 MMHG | DIASTOLIC BLOOD PRESSURE: 91 MMHG | HEART RATE: 88 BPM

## 2023-12-27 DIAGNOSIS — R68.84 JAW PAIN: ICD-10-CM

## 2023-12-27 DIAGNOSIS — R94.31 ABNORMAL ELECTROCARDIOGRAM: ICD-10-CM

## 2023-12-27 LAB
ANION GAP SERPL CALCULATED.3IONS-SCNC: 7 MMOL/L (ref 7–15)
BASOPHILS # BLD AUTO: 0 10E3/UL (ref 0–0.2)
BASOPHILS NFR BLD AUTO: 1 %
BUN SERPL-MCNC: 11.6 MG/DL (ref 8–23)
CALCIUM SERPL-MCNC: 8.6 MG/DL (ref 8.8–10.2)
CHLORIDE SERPL-SCNC: 103 MMOL/L (ref 98–107)
CREAT SERPL-MCNC: 0.66 MG/DL (ref 0.51–0.95)
DEPRECATED HCO3 PLAS-SCNC: 28 MMOL/L (ref 22–29)
EGFRCR SERPLBLD CKD-EPI 2021: >90 ML/MIN/1.73M2
EOSINOPHIL # BLD AUTO: 0.4 10E3/UL (ref 0–0.7)
EOSINOPHIL NFR BLD AUTO: 6 %
ERYTHROCYTE [DISTWIDTH] IN BLOOD BY AUTOMATED COUNT: 12.6 % (ref 10–15)
GLUCOSE SERPL-MCNC: 137 MG/DL (ref 70–99)
HCT VFR BLD AUTO: 38.7 % (ref 35–47)
HGB BLD-MCNC: 12.5 G/DL (ref 11.7–15.7)
HOLD SPECIMEN: NORMAL
HOLD SPECIMEN: NORMAL
IMM GRANULOCYTES # BLD: 0 10E3/UL
IMM GRANULOCYTES NFR BLD: 0 %
LYMPHOCYTES # BLD AUTO: 1.8 10E3/UL (ref 0.8–5.3)
LYMPHOCYTES NFR BLD AUTO: 29 %
MCH RBC QN AUTO: 28.7 PG (ref 26.5–33)
MCHC RBC AUTO-ENTMCNC: 32.3 G/DL (ref 31.5–36.5)
MCV RBC AUTO: 89 FL (ref 78–100)
MONOCYTES # BLD AUTO: 0.4 10E3/UL (ref 0–1.3)
MONOCYTES NFR BLD AUTO: 7 %
NEUTROPHILS # BLD AUTO: 3.7 10E3/UL (ref 1.6–8.3)
NEUTROPHILS NFR BLD AUTO: 57 %
NRBC # BLD AUTO: 0 10E3/UL
NRBC BLD AUTO-RTO: 0 /100
PLATELET # BLD AUTO: 283 10E3/UL (ref 150–450)
POTASSIUM SERPL-SCNC: 4 MMOL/L (ref 3.4–5.3)
RBC # BLD AUTO: 4.35 10E6/UL (ref 3.8–5.2)
SODIUM SERPL-SCNC: 138 MMOL/L (ref 135–145)
TROPONIN T SERPL HS-MCNC: 9 NG/L
WBC # BLD AUTO: 6.3 10E3/UL (ref 4–11)

## 2023-12-27 PROCEDURE — 85025 COMPLETE CBC W/AUTO DIFF WBC: CPT | Performed by: EMERGENCY MEDICINE

## 2023-12-27 PROCEDURE — 99284 EMERGENCY DEPT VISIT MOD MDM: CPT

## 2023-12-27 PROCEDURE — 93005 ELECTROCARDIOGRAM TRACING: CPT

## 2023-12-27 PROCEDURE — 80048 BASIC METABOLIC PNL TOTAL CA: CPT | Performed by: EMERGENCY MEDICINE

## 2023-12-27 PROCEDURE — 84484 ASSAY OF TROPONIN QUANT: CPT | Performed by: EMERGENCY MEDICINE

## 2023-12-27 PROCEDURE — 36415 COLL VENOUS BLD VENIPUNCTURE: CPT | Performed by: EMERGENCY MEDICINE

## 2023-12-27 RX ORDER — PENICILLIN V POTASSIUM 500 MG/1
500 TABLET, FILM COATED ORAL 4 TIMES DAILY
Qty: 28 TABLET | Refills: 0 | Status: SHIPPED | OUTPATIENT
Start: 2023-12-27 | End: 2024-01-03

## 2023-12-27 NOTE — TELEPHONE ENCOUNTER
Pt called in she states she has a abnormal ECG, (unable to see in epic yet) and has had some jaw pain for last 24 hrs. Pt is presently in the ER. Informed her DR Mario out of clinic until 1/2/24, DESIREE Gaxioal RN

## 2023-12-27 NOTE — ED TRIAGE NOTES
Had flu like symptoms for 1 week. Jaw pain started 1 day ago. Went to  and was referred here for EKG changes.

## 2023-12-28 LAB
ATRIAL RATE - MUSE: 76 BPM
DIASTOLIC BLOOD PRESSURE - MUSE: NORMAL MMHG
INTERPRETATION ECG - MUSE: NORMAL
P AXIS - MUSE: 55 DEGREES
PR INTERVAL - MUSE: 158 MS
QRS DURATION - MUSE: 84 MS
QT - MUSE: 370 MS
QTC - MUSE: 416 MS
R AXIS - MUSE: 53 DEGREES
SYSTOLIC BLOOD PRESSURE - MUSE: NORMAL MMHG
T AXIS - MUSE: 88 DEGREES
VENTRICULAR RATE- MUSE: 76 BPM
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
## 2024-01-02 NOTE — TELEPHONE ENCOUNTER
Looks like she was evaluated emergency room and ACS was thought to be less likely.  Troponins are negative.  They diagnosed her with upper respiratory infection.  We should follow-up with her and see if she has any persistent issues after resolution of upper respiratory tract infection.

## 2024-01-29 NOTE — TELEPHONE ENCOUNTER
Return Pt call she left message stating she has some change in breathing. Left phone number for scheduling and this nurses number for return call. Also left message that DR Mario out of office this week and would need to have he see a different provider.DESIREE Gaxiola RN   75M PMH AS, GERD, prostate cancer, presents with chest pain, now admitted for evaluation     Plan:    # Chest pain:  - Trops wnl  - CXR WNL  - Echo w/ EF 55%,  mild to moderate aortic regurgitation  - Pain control  - Monitor on tele  - Cards eval pending    # AS:  - Echo w/ mild to moderate aortic regurgitation  - Monitor on tele  - Pending Cards    # DARIUS:  - Monitor I/O's  - Serial Cr  - Avoid nephrotoxins  - Renally dose medications  - Continue to monitor    # Prostate ca:  - C/w current meds  - C/w Flomax    # GERD:  - C/w current meds/ PPI    # DVT ppx:  - IPC's  - Heparin sq    [employer]

## 2024-02-27 ENCOUNTER — TELEPHONE (OUTPATIENT)
Dept: CARDIOLOGY | Facility: CLINIC | Age: 74
End: 2024-02-27
Payer: MEDICARE

## 2024-02-29 ENCOUNTER — TELEPHONE (OUTPATIENT)
Dept: CARDIOLOGY | Facility: CLINIC | Age: 74
End: 2024-02-29
Payer: MEDICARE

## 2024-02-29 DIAGNOSIS — E78.00 PURE HYPERCHOLESTEROLEMIA: Primary | ICD-10-CM

## 2024-02-29 NOTE — TELEPHONE ENCOUNTER
[facility] Call Center    Phone Message    May a detailed message be left on voicemail: yes     Reason for Call: Other: [facility] called to schedule her annual visit with Dr. Mario but she insisted on scheduling her lab appointment first. [facility] does not have any requests to have labs drawn prior to seeing Dr. Mario. If needed, please place lab orders for [facility]. Thank you!     Action Taken: Other: Cardiology    Travel Screening: Not Applicable    Thank you!  [facility]

## 2024-03-31 ENCOUNTER — HOSPITAL ENCOUNTER (EMERGENCY)
Facility: CLINIC | Age: 74
Discharge: HOME | End: 2024-03-31
Attending: EMERGENCY MEDICINE | Admitting: EMERGENCY MEDICINE
Payer: MEDICARE

## 2024-03-31 VITALS
HEIGHT: 64 IN | DIASTOLIC BLOOD PRESSURE: 96 MMHG | BODY MASS INDEX: 21.23 KG/M2 | SYSTOLIC BLOOD PRESSURE: 147 MMHG | HEART RATE: 73 BPM | RESPIRATION RATE: 16 BRPM | WEIGHT: 124.34 LBS | TEMPERATURE: 98.1 F | OXYGEN SATURATION: 99 %

## 2024-03-31 DIAGNOSIS — L81.9 DISCOLORATION OF SKIN OF TOE: ICD-10-CM

## 2024-03-31 LAB
ANION GAP SERPL CALCULATED.3IONS-SCNC: 10 MMOL/L (ref 7–15)
BASOPHILS # BLD AUTO: 0 10E3/UL (ref 0–0.2)
BASOPHILS NFR BLD AUTO: 0 %
BUN SERPL-MCNC: 9.8 MG/DL (ref 8–23)
CALCIUM SERPL-MCNC: 9.7 MG/DL (ref 8.8–10.2)
CHLORIDE SERPL-SCNC: 102 MMOL/L (ref 98–107)
CREAT SERPL-MCNC: 0.69 MG/DL (ref 0.51–0.95)
DEPRECATED HCO3 PLAS-SCNC: 29 MMOL/L (ref 22–29)
EGFRCR SERPLBLD CKD-EPI 2021: >90 ML/MIN/1.73M2
EOSINOPHIL # BLD AUTO: 0.2 10E3/UL (ref 0–0.7)
EOSINOPHIL NFR BLD AUTO: 3 %
ERYTHROCYTE [DISTWIDTH] IN BLOOD BY AUTOMATED COUNT: 13 % (ref 10–15)
GLUCOSE SERPL-MCNC: 77 MG/DL (ref 70–99)
HCT VFR BLD AUTO: 41 % (ref 35–47)
HGB BLD-MCNC: 13.6 G/DL (ref 11.7–15.7)
HOLD SPECIMEN: NORMAL
IMM GRANULOCYTES # BLD: 0 10E3/UL
IMM GRANULOCYTES NFR BLD: 0 %
LACTATE SERPL-SCNC: 0.7 MMOL/L (ref 0.7–2)
LYMPHOCYTES # BLD AUTO: 2 10E3/UL (ref 0.8–5.3)
LYMPHOCYTES NFR BLD AUTO: 34 %
MCH RBC QN AUTO: 29.4 PG (ref 26.5–33)
MCHC RBC AUTO-ENTMCNC: 33.2 G/DL (ref 31.5–36.5)
MCV RBC AUTO: 89 FL (ref 78–100)
MONOCYTES # BLD AUTO: 0.5 10E3/UL (ref 0–1.3)
MONOCYTES NFR BLD AUTO: 8 %
NEUTROPHILS # BLD AUTO: 3.3 10E3/UL (ref 1.6–8.3)
NEUTROPHILS NFR BLD AUTO: 55 %
NRBC # BLD AUTO: 0 10E3/UL
NRBC BLD AUTO-RTO: 0 /100
NT-PROBNP SERPL-MCNC: 185 PG/ML (ref 0–900)
PLATELET # BLD AUTO: 303 10E3/UL (ref 150–450)
POTASSIUM SERPL-SCNC: 4.5 MMOL/L (ref 3.4–5.3)
RBC # BLD AUTO: 4.62 10E6/UL (ref 3.8–5.2)
SODIUM SERPL-SCNC: 141 MMOL/L (ref 135–145)
TSH SERPL DL<=0.005 MIU/L-ACNC: 1.71 UIU/ML (ref 0.3–4.2)
WBC # BLD AUTO: 6 10E3/UL (ref 4–11)

## 2024-03-31 PROCEDURE — 83605 ASSAY OF LACTIC ACID: CPT | Performed by: EMERGENCY MEDICINE

## 2024-03-31 PROCEDURE — 36415 COLL VENOUS BLD VENIPUNCTURE: CPT | Performed by: EMERGENCY MEDICINE

## 2024-03-31 PROCEDURE — 93005 ELECTROCARDIOGRAM TRACING: CPT | Mod: RTG

## 2024-03-31 PROCEDURE — 80048 BASIC METABOLIC PNL TOTAL CA: CPT | Performed by: EMERGENCY MEDICINE

## 2024-03-31 PROCEDURE — 99284 EMERGENCY DEPT VISIT MOD MDM: CPT

## 2024-03-31 PROCEDURE — 83880 ASSAY OF NATRIURETIC PEPTIDE: CPT | Mod: GZ | Performed by: EMERGENCY MEDICINE

## 2024-03-31 PROCEDURE — 85025 COMPLETE CBC W/AUTO DIFF WBC: CPT | Performed by: EMERGENCY MEDICINE

## 2024-03-31 PROCEDURE — 84443 ASSAY THYROID STIM HORMONE: CPT | Performed by: EMERGENCY MEDICINE

## 2024-03-31 ASSESSMENT — COLUMBIA-SUICIDE SEVERITY RATING SCALE - C-SSRS
2. HAVE YOU ACTUALLY HAD ANY THOUGHTS OF KILLING YOURSELF IN THE PAST MONTH?: NO
6. HAVE YOU EVER DONE ANYTHING, STARTED TO DO ANYTHING, OR PREPARED TO DO ANYTHING TO END YOUR LIFE?: NO
1. IN THE PAST MONTH, HAVE YOU WISHED YOU WERE DEAD OR WISHED YOU COULD GO TO SLEEP AND NOT WAKE UP?: NO

## 2024-03-31 ASSESSMENT — ACTIVITIES OF DAILY LIVING (ADL)
ADLS_ACUITY_SCORE: 35
ADLS_ACUITY_SCORE: 33
ADLS_ACUITY_SCORE: 35

## 2024-03-31 NOTE — ED TRIAGE NOTES
Patient states she has bruising to the toes on both feet. Patient states this is going on with the big toe and the one next to it.  Patient denies injury and states this has been going on for 5 days. Patient googled her symptoms and it indicated it could be heart related and she has a heart hx.      Triage Assessment (Adult)       Row Name 03/31/24 1824          Triage Assessment    Airway WDL WDL        Respiratory WDL    Respiratory WDL WDL        Skin Circulation/Temperature WDL    Skin Circulation/Temperature WDL WDL        Cardiac WDL    Cardiac WDL WDL

## 2024-04-01 ENCOUNTER — MEDICAL CORRESPONDENCE (OUTPATIENT)
Dept: HEALTH INFORMATION MANAGEMENT | Facility: CLINIC | Age: 74
End: 2024-04-01

## 2024-04-01 LAB
ATRIAL RATE - MUSE: 68 BPM
DIASTOLIC BLOOD PRESSURE - MUSE: NORMAL MMHG
INTERPRETATION ECG - MUSE: NORMAL
P AXIS - MUSE: 65 DEGREES
PR INTERVAL - MUSE: 152 MS
QRS DURATION - MUSE: 76 MS
QT - MUSE: 382 MS
QTC - MUSE: 406 MS
R AXIS - MUSE: 38 DEGREES
SYSTOLIC BLOOD PRESSURE - MUSE: NORMAL MMHG
T AXIS - MUSE: 91 DEGREES
VENTRICULAR RATE- MUSE: 68 BPM
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
## 2024-04-01 NOTE — DISCHARGE INSTRUCTIONS
Your blood work and EKG all appear normal.  Please keep your appointment with your primary doctor on Wednesday.  Please return the emergency department if you develop any new or concerning symptoms.

## 2024-04-30 ENCOUNTER — ANESTHESIA EVENT (OUTPATIENT)
Dept: SURGERY | Facility: CLINIC | Age: 74
DRG: 469 | End: 2024-04-30
Payer: MEDICARE

## 2024-04-30 RX ORDER — CYCLOBENZAPRINE HCL 5 MG
5 TABLET ORAL
COMMUNITY

## 2024-04-30 RX ORDER — ERGOCALCIFEROL (VITAMIN D2) 10 MCG
3 TABLET ORAL DAILY
COMMUNITY

## 2024-04-30 RX ORDER — NORTRIPTYLINE HCL 25 MG
2 CAPSULE ORAL AT BEDTIME
COMMUNITY

## 2024-04-30 RX ORDER — METOPROLOL TARTRATE 25 MG/1
25 TABLET, FILM COATED ORAL 2 TIMES DAILY
COMMUNITY

## 2024-04-30 RX ORDER — TRETINOIN 1 MG/G
CREAM TOPICAL AT BEDTIME
COMMUNITY

## 2024-04-30 RX ORDER — LIDOCAINE 4 G/G
1 PATCH TOPICAL EVERY 24 HOURS
COMMUNITY

## 2024-04-30 RX ORDER — SIMVASTATIN 80 MG
80 TABLET ORAL AT BEDTIME
COMMUNITY

## 2024-04-30 ASSESSMENT — LIFESTYLE VARIABLES: TOBACCO_USE: 0

## 2024-04-30 NOTE — PROGRESS NOTES
PTA medications updated by Medication Scribe prior to surgery via phone call with patient (last doses completed by Nurse)     Medication history sources: Patient, Surescripts, and H&P  In the past week, patient estimated taking medication this percent of the time: Greater than 90%      Significant changes made to the medication list:  None      Additional medication history information:   None    Medication reconciliation completed by provider prior to medication history? No    Time spent in this activity: 30 MINUTES    The information provided in this note is only as accurate as the sources available at the time of update(s)      Prior to Admission medications    Medication Sig Last Dose Taking? Auth Provider Long Term End Date   acetaminophen (TYLENOL) 500 MG tablet Take 1,000 mg by mouth every 6 hours as needed  at PRN Yes Unknown, Entered By History     aspirin 81 MG EC tablet Take 81 mg by mouth daily.  Yes Unknown, Entered By History     biotin 1000 MCG TABS tablet Take 1 tablet by mouth daily  Yes Unknown, Entered By History     cyclobenzaprine (FLEXERIL) 5 MG tablet Take 5 mg by mouth nightly as needed for muscle spasms  at PM Yes Reported, Patient     ezetimibe (ZETIA) 10 MG tablet Take 1 tablet (10 mg) by mouth daily  at AM Yes August Mario MD Yes    fish oil-omega-3 fatty acids 1000 MG capsule Take 1 g by mouth daily.  Yes Unknown, Entered By History     Lidocaine (LIDOCARE) 4 % Patch Place 1 patch onto the skin every 24 hours To prevent lidocaine toxicity, patient should be patch free for 12 hrs daily.  Yes Reported, Patient     losartan (COZAAR) 50 MG tablet Take 1 tablet (50 mg) by mouth daily  at AM Yes August Mario MD Yes    metoprolol tartrate (LOPRESSOR) 25 MG tablet Take 25 mg by mouth 2 times daily  Yes Reported, Patient No    nortriptyline (PAMELOR) 25 MG capsule Take 2 capsules by mouth at bedtime  Yes Reported, Patient No    omeprazole (PRILOSEC OTC) 20 MG EC tablet Take 1  tablet (20 mg) by mouth daily  Patient taking differently: Take 20 mg by mouth 2 times daily  Yes August Mario MD     simvastatin (ZOCOR) 80 MG tablet Take 80 mg by mouth at bedtime  at PM Yes Reported, Patient No    tretinoin (RETIN-A) 0.1 % external cream Apply topically at bedtime to face as needed  at PRN Yes Reported, Patient     Vitamin D, Cholecalciferol, 10 MCG (400 UNIT) TABS Take 3 tablets by mouth daily  Yes Reported, Patient     nitroGLYcerin (NITROSTAT) 0.4 MG sublingual tablet Place 1 tablet (0.4 mg) under the tongue every 5 minutes as needed   August Mario MD Yes        Medication history completed by: Kera Flores
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
## 2024-05-01 ENCOUNTER — APPOINTMENT (OUTPATIENT)
Dept: PHYSICAL THERAPY | Facility: CLINIC | Age: 74
DRG: 469 | End: 2024-05-01
Attending: ORTHOPAEDIC SURGERY
Payer: MEDICARE

## 2024-05-01 ENCOUNTER — APPOINTMENT (OUTPATIENT)
Dept: GENERAL RADIOLOGY | Facility: CLINIC | Age: 74
DRG: 469 | End: 2024-05-01
Attending: ORTHOPAEDIC SURGERY
Payer: MEDICARE

## 2024-05-01 ENCOUNTER — APPOINTMENT (OUTPATIENT)
Dept: GENERAL RADIOLOGY | Facility: CLINIC | Age: 74
DRG: 469 | End: 2024-05-01
Attending: ANESTHESIOLOGY
Payer: MEDICARE

## 2024-05-01 ENCOUNTER — HOSPITAL ENCOUNTER (INPATIENT)
Facility: CLINIC | Age: 74
LOS: 4 days | Discharge: HOME HEALTH | DRG: 469 | End: 2024-05-05
Attending: ORTHOPAEDIC SURGERY | Admitting: ORTHOPAEDIC SURGERY
Payer: MEDICARE

## 2024-05-01 ENCOUNTER — ANESTHESIA (OUTPATIENT)
Dept: SURGERY | Facility: CLINIC | Age: 74
DRG: 469 | End: 2024-05-01
Payer: MEDICARE

## 2024-05-01 DIAGNOSIS — I25.10 CORONARY ARTERY DISEASE INVOLVING NATIVE CORONARY ARTERY OF NATIVE HEART WITHOUT ANGINA PECTORIS: ICD-10-CM

## 2024-05-01 DIAGNOSIS — J69.0 ASPIRATION PNEUMONIA, UNSPECIFIED ASPIRATION PNEUMONIA TYPE, UNSPECIFIED LATERALITY, UNSPECIFIED PART OF LUNG (H): ICD-10-CM

## 2024-05-01 DIAGNOSIS — Z96.641 H/O TOTAL HIP ARTHROPLASTY, RIGHT: Primary | ICD-10-CM

## 2024-05-01 DIAGNOSIS — Z96.641 HX OF TOTAL HIP ARTHROPLASTY, RIGHT: ICD-10-CM

## 2024-05-01 LAB
ABO/RH(D): NORMAL
ANTIBODY SCREEN: NEGATIVE
BASOPHILS # BLD AUTO: 0 10E3/UL (ref 0–0.2)
BASOPHILS NFR BLD AUTO: 0 %
EOSINOPHIL # BLD AUTO: 0 10E3/UL (ref 0–0.7)
EOSINOPHIL NFR BLD AUTO: 0 %
ERYTHROCYTE [DISTWIDTH] IN BLOOD BY AUTOMATED COUNT: 12.8 % (ref 10–15)
GLUCOSE BLDC GLUCOMTR-MCNC: 86 MG/DL (ref 70–99)
GLUCOSE BLDC GLUCOMTR-MCNC: 89 MG/DL (ref 70–99)
GLUCOSE BLDC GLUCOMTR-MCNC: 99 MG/DL (ref 70–99)
GLUCOSE SERPL-MCNC: 102 MG/DL (ref 70–99)
HCT VFR BLD AUTO: 29.7 % (ref 35–47)
HGB BLD-MCNC: 9.6 G/DL (ref 11.7–15.7)
IMM GRANULOCYTES # BLD: 0 10E3/UL
IMM GRANULOCYTES NFR BLD: 0 %
LACTATE SERPL-SCNC: 1.2 MMOL/L (ref 0.7–2)
LYMPHOCYTES # BLD AUTO: 0.7 10E3/UL (ref 0.8–5.3)
LYMPHOCYTES NFR BLD AUTO: 7 %
MCH RBC QN AUTO: 29.1 PG (ref 26.5–33)
MCHC RBC AUTO-ENTMCNC: 32.3 G/DL (ref 31.5–36.5)
MCV RBC AUTO: 90 FL (ref 78–100)
MONOCYTES # BLD AUTO: 0.5 10E3/UL (ref 0–1.3)
MONOCYTES NFR BLD AUTO: 5 %
NEUTROPHILS # BLD AUTO: 8.9 10E3/UL (ref 1.6–8.3)
NEUTROPHILS NFR BLD AUTO: 88 %
NRBC # BLD AUTO: 0 10E3/UL
NRBC BLD AUTO-RTO: 0 /100
PLATELET # BLD AUTO: 214 10E3/UL (ref 150–450)
POTASSIUM SERPL-SCNC: 4.1 MMOL/L (ref 3.4–5.3)
RBC # BLD AUTO: 3.3 10E6/UL (ref 3.8–5.2)
SPECIMEN EXPIRATION DATE: NORMAL
WBC # BLD AUTO: 10.1 10E3/UL (ref 4–11)

## 2024-05-01 PROCEDURE — 27130 TOTAL HIP ARTHROPLASTY: CPT

## 2024-05-01 PROCEDURE — 36415 COLL VENOUS BLD VENIPUNCTURE: CPT | Performed by: ANESTHESIOLOGY

## 2024-05-01 PROCEDURE — 710N000009 HC RECOVERY PHASE 1, LEVEL 1, PER MIN: Performed by: ORTHOPAEDIC SURGERY

## 2024-05-01 PROCEDURE — 36415 COLL VENOUS BLD VENIPUNCTURE: CPT | Performed by: ORTHOPAEDIC SURGERY

## 2024-05-01 PROCEDURE — 82947 ASSAY GLUCOSE BLOOD QUANT: CPT | Performed by: ANESTHESIOLOGY

## 2024-05-01 PROCEDURE — 250N000011 HC RX IP 250 OP 636: Performed by: ORTHOPAEDIC SURGERY

## 2024-05-01 PROCEDURE — 250N000011 HC RX IP 250 OP 636

## 2024-05-01 PROCEDURE — 120N000001 HC R&B MED SURG/OB

## 2024-05-01 PROCEDURE — 272N000002 HC OR SUPPLY OTHER OPNP: Performed by: ORTHOPAEDIC SURGERY

## 2024-05-01 PROCEDURE — 999N000065 XR PELVIS PORT 1/2 VIEWS

## 2024-05-01 PROCEDURE — 250N000009 HC RX 250: Performed by: ORTHOPAEDIC SURGERY

## 2024-05-01 PROCEDURE — 86900 BLOOD TYPING SEROLOGIC ABO: CPT | Performed by: ORTHOPAEDIC SURGERY

## 2024-05-01 PROCEDURE — 97530 THERAPEUTIC ACTIVITIES: CPT | Mod: GP

## 2024-05-01 PROCEDURE — 258N000003 HC RX IP 258 OP 636

## 2024-05-01 PROCEDURE — 83605 ASSAY OF LACTIC ACID: CPT | Performed by: ORTHOPAEDIC SURGERY

## 2024-05-01 PROCEDURE — 250N000013 HC RX MED GY IP 250 OP 250 PS 637

## 2024-05-01 PROCEDURE — C1776 JOINT DEVICE (IMPLANTABLE): HCPCS | Performed by: ORTHOPAEDIC SURGERY

## 2024-05-01 PROCEDURE — 27130 TOTAL HIP ARTHROPLASTY: CPT | Performed by: ANESTHESIOLOGY

## 2024-05-01 PROCEDURE — 97161 PT EVAL LOW COMPLEX 20 MIN: CPT | Mod: GP

## 2024-05-01 PROCEDURE — 85025 COMPLETE CBC W/AUTO DIFF WBC: CPT

## 2024-05-01 PROCEDURE — 99222 1ST HOSP IP/OBS MODERATE 55: CPT

## 2024-05-01 PROCEDURE — 999N000141 HC STATISTIC PRE-PROCEDURE NURSING ASSESSMENT: Performed by: ORTHOPAEDIC SURGERY

## 2024-05-01 PROCEDURE — C1713 ANCHOR/SCREW BN/BN,TIS/BN: HCPCS | Performed by: ORTHOPAEDIC SURGERY

## 2024-05-01 PROCEDURE — 272N000001 HC OR GENERAL SUPPLY STERILE: Performed by: ORTHOPAEDIC SURGERY

## 2024-05-01 PROCEDURE — 250N000013 HC RX MED GY IP 250 OP 250 PS 637: Performed by: ORTHOPAEDIC SURGERY

## 2024-05-01 PROCEDURE — P9045 ALBUMIN (HUMAN), 5%, 250 ML: HCPCS | Mod: JZ

## 2024-05-01 PROCEDURE — 99100 ANES PT EXTEME AGE<1 YR&>70: CPT

## 2024-05-01 PROCEDURE — 36415 COLL VENOUS BLD VENIPUNCTURE: CPT

## 2024-05-01 PROCEDURE — 97116 GAIT TRAINING THERAPY: CPT | Mod: GP

## 2024-05-01 PROCEDURE — 84132 ASSAY OF SERUM POTASSIUM: CPT | Performed by: ANESTHESIOLOGY

## 2024-05-01 PROCEDURE — 250N000011 HC RX IP 250 OP 636: Performed by: ANESTHESIOLOGY

## 2024-05-01 PROCEDURE — 370N000017 HC ANESTHESIA TECHNICAL FEE, PER MIN: Performed by: ORTHOPAEDIC SURGERY

## 2024-05-01 PROCEDURE — 258N000003 HC RX IP 258 OP 636: Performed by: ORTHOPAEDIC SURGERY

## 2024-05-01 PROCEDURE — 360N000077 HC SURGERY LEVEL 4, PER MIN: Performed by: ORTHOPAEDIC SURGERY

## 2024-05-01 PROCEDURE — 0SR901A REPLACEMENT OF RIGHT HIP JOINT WITH METAL SYNTHETIC SUBSTITUTE, UNCEMENTED, OPEN APPROACH: ICD-10-PCS | Performed by: ORTHOPAEDIC SURGERY

## 2024-05-01 PROCEDURE — 999N000065 XR CHEST PORT 1 VIEW

## 2024-05-01 DEVICE — SUMMIT FEMORAL STEM 12/14 TAPER TAPER ED W/POROCOAT SIZE 5 HI 145MM
Type: IMPLANTABLE DEVICE | Site: HIP | Status: FUNCTIONAL
Brand: SUMMIT POROCOAT

## 2024-05-01 DEVICE — ARTICUL/EZE FEMORAL HEAD DIAMETER 32MM +1 12/14 TAPER
Type: IMPLANTABLE DEVICE | Site: HIP | Status: FUNCTIONAL
Brand: ARTICUL/EZE

## 2024-05-01 DEVICE — PINNACLE CANCELLOUS BONE SCREW 6.5MM X 25MM
Type: IMPLANTABLE DEVICE | Site: HIP | Status: FUNCTIONAL
Brand: PINNACLE

## 2024-05-01 DEVICE — PINNACLE CANCELLOUS BONE SCREW 6.5MM X 30MM
Type: IMPLANTABLE DEVICE | Site: HIP | Status: FUNCTIONAL
Brand: PINNACLE

## 2024-05-01 DEVICE — PINNACLE POROCOAT ACETABULAR SHELL SECTOR II 50MM OD
Type: IMPLANTABLE DEVICE | Site: HIP | Status: FUNCTIONAL
Brand: PINNACLE POROCOAT

## 2024-05-01 DEVICE — PINNACLE HIP SOLUTIONS ALTRX POLYETHYLENE ACETABULAR LINER +4 NEUTRAL 32MM ID 50MM OD
Type: IMPLANTABLE DEVICE | Site: HIP | Status: FUNCTIONAL
Brand: PINNACLE ALTRX

## 2024-05-01 RX ORDER — SODIUM CHLORIDE, SODIUM LACTATE, POTASSIUM CHLORIDE, CALCIUM CHLORIDE 600; 310; 30; 20 MG/100ML; MG/100ML; MG/100ML; MG/100ML
INJECTION, SOLUTION INTRAVENOUS CONTINUOUS
Status: DISCONTINUED | OUTPATIENT
Start: 2024-05-01 | End: 2024-05-01

## 2024-05-01 RX ORDER — TRANEXAMIC ACID 10 MG/ML
1 INJECTION, SOLUTION INTRAVENOUS ONCE
Status: COMPLETED | OUTPATIENT
Start: 2024-05-01 | End: 2024-05-01

## 2024-05-01 RX ORDER — HYDROMORPHONE HCL IN WATER/PF 6 MG/30 ML
0.2 PATIENT CONTROLLED ANALGESIA SYRINGE INTRAVENOUS EVERY 5 MIN PRN
Status: DISCONTINUED | OUTPATIENT
Start: 2024-05-01 | End: 2024-05-01 | Stop reason: HOSPADM

## 2024-05-01 RX ORDER — NITROGLYCERIN 0.4 MG/1
0.4 TABLET SUBLINGUAL EVERY 5 MIN PRN
Status: DISCONTINUED | OUTPATIENT
Start: 2024-05-01 | End: 2024-05-05 | Stop reason: HOSPADM

## 2024-05-01 RX ORDER — ASPIRIN 81 MG/1
81 TABLET ORAL 2 TIMES DAILY
Status: DISCONTINUED | OUTPATIENT
Start: 2024-05-01 | End: 2024-05-05 | Stop reason: HOSPADM

## 2024-05-01 RX ORDER — MAGNESIUM HYDROXIDE 1200 MG/15ML
LIQUID ORAL PRN
Status: DISCONTINUED | OUTPATIENT
Start: 2024-05-01 | End: 2024-05-01 | Stop reason: HOSPADM

## 2024-05-01 RX ORDER — VANCOMYCIN HYDROCHLORIDE 1 G/20ML
INJECTION, POWDER, LYOPHILIZED, FOR SOLUTION INTRAVENOUS PRN
Status: DISCONTINUED | OUTPATIENT
Start: 2024-05-01 | End: 2024-05-01 | Stop reason: HOSPADM

## 2024-05-01 RX ORDER — OXYCODONE HYDROCHLORIDE 5 MG/1
10 TABLET ORAL EVERY 4 HOURS PRN
Status: DISCONTINUED | OUTPATIENT
Start: 2024-05-01 | End: 2024-05-02

## 2024-05-01 RX ORDER — METOPROLOL TARTRATE 25 MG/1
25 TABLET, FILM COATED ORAL 2 TIMES DAILY
Status: DISCONTINUED | OUTPATIENT
Start: 2024-05-01 | End: 2024-05-02

## 2024-05-01 RX ORDER — HYDROMORPHONE HCL IN WATER/PF 6 MG/30 ML
0.4 PATIENT CONTROLLED ANALGESIA SYRINGE INTRAVENOUS
Status: DISCONTINUED | OUTPATIENT
Start: 2024-05-01 | End: 2024-05-04

## 2024-05-01 RX ORDER — CEFAZOLIN SODIUM/WATER 2 G/20 ML
2 SYRINGE (ML) INTRAVENOUS
Status: COMPLETED | OUTPATIENT
Start: 2024-05-01 | End: 2024-05-01

## 2024-05-01 RX ORDER — HYDROXYZINE HYDROCHLORIDE 10 MG/1
10 TABLET, FILM COATED ORAL EVERY 6 HOURS PRN
Status: DISCONTINUED | OUTPATIENT
Start: 2024-05-01 | End: 2024-05-05 | Stop reason: HOSPADM

## 2024-05-01 RX ORDER — BUPIVACAINE HYDROCHLORIDE 7.5 MG/ML
INJECTION, SOLUTION INTRASPINAL PRN
Status: DISCONTINUED | OUTPATIENT
Start: 2024-05-01 | End: 2024-05-01

## 2024-05-01 RX ORDER — PROCHLORPERAZINE MALEATE 5 MG
5 TABLET ORAL EVERY 6 HOURS PRN
Status: DISCONTINUED | OUTPATIENT
Start: 2024-05-01 | End: 2024-05-05 | Stop reason: HOSPADM

## 2024-05-01 RX ORDER — ASPIRIN 81 MG/1
81 TABLET ORAL 2 TIMES DAILY
Qty: 120 TABLET | Refills: 0 | Status: SHIPPED | OUTPATIENT
Start: 2024-05-01 | End: 2024-05-31

## 2024-05-01 RX ORDER — OXYBUTYNIN CHLORIDE 5 MG/1
5 TABLET ORAL 3 TIMES DAILY
COMMUNITY

## 2024-05-01 RX ORDER — NALOXONE HYDROCHLORIDE 0.4 MG/ML
0.4 INJECTION, SOLUTION INTRAMUSCULAR; INTRAVENOUS; SUBCUTANEOUS
Status: DISCONTINUED | OUTPATIENT
Start: 2024-05-01 | End: 2024-05-05 | Stop reason: HOSPADM

## 2024-05-01 RX ORDER — OXYCODONE HYDROCHLORIDE 5 MG/1
5 TABLET ORAL EVERY 6 HOURS PRN
Qty: 33 TABLET | Refills: 0 | Status: SHIPPED | OUTPATIENT
Start: 2024-05-01

## 2024-05-01 RX ORDER — HYDROXYZINE HYDROCHLORIDE 25 MG/1
25 TABLET, FILM COATED ORAL EVERY 6 HOURS PRN
Qty: 30 TABLET | Refills: 1 | Status: SHIPPED | OUTPATIENT
Start: 2024-05-01

## 2024-05-01 RX ORDER — SODIUM CHLORIDE, SODIUM LACTATE, POTASSIUM CHLORIDE, CALCIUM CHLORIDE 600; 310; 30; 20 MG/100ML; MG/100ML; MG/100ML; MG/100ML
INJECTION, SOLUTION INTRAVENOUS CONTINUOUS
Status: DISCONTINUED | OUTPATIENT
Start: 2024-05-01 | End: 2024-05-01 | Stop reason: HOSPADM

## 2024-05-01 RX ORDER — BISACODYL 10 MG
10 SUPPOSITORY, RECTAL RECTAL DAILY PRN
Status: DISCONTINUED | OUTPATIENT
Start: 2024-05-04 | End: 2024-05-05 | Stop reason: HOSPADM

## 2024-05-01 RX ORDER — ONDANSETRON 2 MG/ML
INJECTION INTRAMUSCULAR; INTRAVENOUS PRN
Status: DISCONTINUED | OUTPATIENT
Start: 2024-05-01 | End: 2024-05-01

## 2024-05-01 RX ORDER — PROPOFOL 10 MG/ML
INJECTION, EMULSION INTRAVENOUS CONTINUOUS PRN
Status: DISCONTINUED | OUTPATIENT
Start: 2024-05-01 | End: 2024-05-01

## 2024-05-01 RX ORDER — ACETAMINOPHEN 325 MG/1
650 TABLET ORAL EVERY 4 HOURS PRN
Status: DISCONTINUED | OUTPATIENT
Start: 2024-05-04 | End: 2024-05-05 | Stop reason: HOSPADM

## 2024-05-01 RX ORDER — ACETAMINOPHEN 325 MG/1
975 TABLET ORAL EVERY 8 HOURS
Status: COMPLETED | OUTPATIENT
Start: 2024-05-01 | End: 2024-05-04

## 2024-05-01 RX ORDER — POLYETHYLENE GLYCOL 3350 17 G/17G
17 POWDER, FOR SOLUTION ORAL DAILY
Status: DISCONTINUED | OUTPATIENT
Start: 2024-05-02 | End: 2024-05-05 | Stop reason: HOSPADM

## 2024-05-01 RX ORDER — ALBUTEROL SULFATE 0.83 MG/ML
2.5 SOLUTION RESPIRATORY (INHALATION)
Status: DISCONTINUED | OUTPATIENT
Start: 2024-05-01 | End: 2024-05-05 | Stop reason: HOSPADM

## 2024-05-01 RX ORDER — FENTANYL CITRATE 0.05 MG/ML
25 INJECTION, SOLUTION INTRAMUSCULAR; INTRAVENOUS EVERY 5 MIN PRN
Status: DISCONTINUED | OUTPATIENT
Start: 2024-05-01 | End: 2024-05-01 | Stop reason: HOSPADM

## 2024-05-01 RX ORDER — ONDANSETRON 2 MG/ML
4 INJECTION INTRAMUSCULAR; INTRAVENOUS EVERY 30 MIN PRN
Status: DISCONTINUED | OUTPATIENT
Start: 2024-05-01 | End: 2024-05-01 | Stop reason: HOSPADM

## 2024-05-01 RX ORDER — ONDANSETRON 4 MG/1
4 TABLET, ORALLY DISINTEGRATING ORAL EVERY 30 MIN PRN
Status: DISCONTINUED | OUTPATIENT
Start: 2024-05-01 | End: 2024-05-01 | Stop reason: HOSPADM

## 2024-05-01 RX ORDER — AMOXICILLIN 250 MG
1 CAPSULE ORAL 2 TIMES DAILY
Status: DISCONTINUED | OUTPATIENT
Start: 2024-05-01 | End: 2024-05-05 | Stop reason: HOSPADM

## 2024-05-01 RX ORDER — OXYCODONE HYDROCHLORIDE 5 MG/1
5 TABLET ORAL EVERY 4 HOURS PRN
Status: DISCONTINUED | OUTPATIENT
Start: 2024-05-01 | End: 2024-05-02

## 2024-05-01 RX ORDER — HYDROMORPHONE HCL IN WATER/PF 6 MG/30 ML
0.4 PATIENT CONTROLLED ANALGESIA SYRINGE INTRAVENOUS EVERY 5 MIN PRN
Status: DISCONTINUED | OUTPATIENT
Start: 2024-05-01 | End: 2024-05-01 | Stop reason: HOSPADM

## 2024-05-01 RX ORDER — LOSARTAN POTASSIUM 50 MG/1
50 TABLET ORAL DAILY
Status: DISCONTINUED | OUTPATIENT
Start: 2024-05-01 | End: 2024-05-05 | Stop reason: HOSPADM

## 2024-05-01 RX ORDER — ONDANSETRON 2 MG/ML
4 INJECTION INTRAMUSCULAR; INTRAVENOUS EVERY 6 HOURS PRN
Status: DISCONTINUED | OUTPATIENT
Start: 2024-05-01 | End: 2024-05-05 | Stop reason: HOSPADM

## 2024-05-01 RX ORDER — PROPOFOL 10 MG/ML
INJECTION, EMULSION INTRAVENOUS PRN
Status: DISCONTINUED | OUTPATIENT
Start: 2024-05-01 | End: 2024-05-01

## 2024-05-01 RX ORDER — ONDANSETRON 4 MG/1
4 TABLET, ORALLY DISINTEGRATING ORAL EVERY 6 HOURS PRN
Status: DISCONTINUED | OUTPATIENT
Start: 2024-05-01 | End: 2024-05-05 | Stop reason: HOSPADM

## 2024-05-01 RX ORDER — HYDROMORPHONE HCL IN WATER/PF 6 MG/30 ML
0.2 PATIENT CONTROLLED ANALGESIA SYRINGE INTRAVENOUS
Status: DISCONTINUED | OUTPATIENT
Start: 2024-05-01 | End: 2024-05-02

## 2024-05-01 RX ORDER — NALOXONE HYDROCHLORIDE 0.4 MG/ML
0.1 INJECTION, SOLUTION INTRAMUSCULAR; INTRAVENOUS; SUBCUTANEOUS
Status: DISCONTINUED | OUTPATIENT
Start: 2024-05-01 | End: 2024-05-01 | Stop reason: HOSPADM

## 2024-05-01 RX ORDER — CEFAZOLIN SODIUM 1 G/3ML
1 INJECTION, POWDER, FOR SOLUTION INTRAMUSCULAR; INTRAVENOUS EVERY 8 HOURS
Qty: 10 ML | Refills: 0 | Status: COMPLETED | OUTPATIENT
Start: 2024-05-01 | End: 2024-05-02

## 2024-05-01 RX ORDER — SODIUM CHLORIDE 9 MG/ML
INJECTION, SOLUTION INTRAVENOUS CONTINUOUS
Status: DISCONTINUED | OUTPATIENT
Start: 2024-05-01 | End: 2024-05-02

## 2024-05-01 RX ORDER — PANTOPRAZOLE SODIUM 40 MG/1
40 TABLET, DELAYED RELEASE ORAL DAILY
Status: DISCONTINUED | OUTPATIENT
Start: 2024-05-01 | End: 2024-05-05 | Stop reason: HOSPADM

## 2024-05-01 RX ORDER — FENTANYL CITRATE 0.05 MG/ML
50 INJECTION, SOLUTION INTRAMUSCULAR; INTRAVENOUS EVERY 5 MIN PRN
Status: DISCONTINUED | OUTPATIENT
Start: 2024-05-01 | End: 2024-05-01 | Stop reason: HOSPADM

## 2024-05-01 RX ORDER — EZETIMIBE 10 MG/1
10 TABLET ORAL DAILY
Status: DISCONTINUED | OUTPATIENT
Start: 2024-05-02 | End: 2024-05-05 | Stop reason: HOSPADM

## 2024-05-01 RX ORDER — NALOXONE HYDROCHLORIDE 0.4 MG/ML
0.2 INJECTION, SOLUTION INTRAMUSCULAR; INTRAVENOUS; SUBCUTANEOUS
Status: DISCONTINUED | OUTPATIENT
Start: 2024-05-01 | End: 2024-05-05 | Stop reason: HOSPADM

## 2024-05-01 RX ORDER — SODIUM CHLORIDE, SODIUM LACTATE, POTASSIUM CHLORIDE, CALCIUM CHLORIDE 600; 310; 30; 20 MG/100ML; MG/100ML; MG/100ML; MG/100ML
INJECTION, SOLUTION INTRAVENOUS CONTINUOUS PRN
Status: DISCONTINUED | OUTPATIENT
Start: 2024-05-01 | End: 2024-05-01

## 2024-05-01 RX ORDER — CEFAZOLIN SODIUM/WATER 2 G/20 ML
2 SYRINGE (ML) INTRAVENOUS SEE ADMIN INSTRUCTIONS
Status: DISCONTINUED | OUTPATIENT
Start: 2024-05-01 | End: 2024-05-01

## 2024-05-01 RX ORDER — LIDOCAINE 40 MG/G
CREAM TOPICAL
Status: DISCONTINUED | OUTPATIENT
Start: 2024-05-01 | End: 2024-05-05 | Stop reason: HOSPADM

## 2024-05-01 RX ORDER — CYCLOBENZAPRINE HCL 5 MG
5 TABLET ORAL
Status: DISCONTINUED | OUTPATIENT
Start: 2024-05-01 | End: 2024-05-05 | Stop reason: HOSPADM

## 2024-05-01 RX ORDER — OXYBUTYNIN CHLORIDE 5 MG/1
5 TABLET ORAL 3 TIMES DAILY
Status: DISCONTINUED | OUTPATIENT
Start: 2024-05-01 | End: 2024-05-05 | Stop reason: HOSPADM

## 2024-05-01 RX ORDER — SIMVASTATIN 40 MG
80 TABLET ORAL AT BEDTIME
Status: DISCONTINUED | OUTPATIENT
Start: 2024-05-01 | End: 2024-05-05 | Stop reason: HOSPADM

## 2024-05-01 RX ORDER — NORTRIPTYLINE HYDROCHLORIDE 50 MG/1
50 CAPSULE ORAL AT BEDTIME
Status: DISCONTINUED | OUTPATIENT
Start: 2024-05-01 | End: 2024-05-05 | Stop reason: HOSPADM

## 2024-05-01 RX ADMIN — ASPIRIN 81 MG: 81 TABLET, COATED ORAL at 21:29

## 2024-05-01 RX ADMIN — ACETAMINOPHEN 975 MG: 325 TABLET, FILM COATED ORAL at 21:29

## 2024-05-01 RX ADMIN — ASPIRIN 81 MG: 81 TABLET, COATED ORAL at 12:41

## 2024-05-01 RX ADMIN — OXYBUTYNIN CHLORIDE 5 MG: 5 TABLET ORAL at 16:33

## 2024-05-01 RX ADMIN — PROPOFOL 125 MCG/KG/MIN: 10 INJECTION, EMULSION INTRAVENOUS at 08:18

## 2024-05-01 RX ADMIN — SIMVASTATIN 80 MG: 40 TABLET, FILM COATED ORAL at 21:29

## 2024-05-01 RX ADMIN — SENNOSIDES AND DOCUSATE SODIUM 1 TABLET: 50; 8.6 TABLET ORAL at 21:29

## 2024-05-01 RX ADMIN — PROPOFOL 50 MG: 10 INJECTION, EMULSION INTRAVENOUS at 08:18

## 2024-05-01 RX ADMIN — TRANEXAMIC ACID 1 G: 10 INJECTION, SOLUTION INTRAVENOUS at 08:18

## 2024-05-01 RX ADMIN — ACETAMINOPHEN 975 MG: 325 TABLET, FILM COATED ORAL at 12:42

## 2024-05-01 RX ADMIN — ONDANSETRON 4 MG: 2 INJECTION INTRAMUSCULAR; INTRAVENOUS at 08:20

## 2024-05-01 RX ADMIN — SODIUM CHLORIDE: 9 INJECTION, SOLUTION INTRAVENOUS at 12:44

## 2024-05-01 RX ADMIN — METOPROLOL TARTRATE 25 MG: 25 TABLET, FILM COATED ORAL at 21:29

## 2024-05-01 RX ADMIN — PHENYLEPHRINE HYDROCHLORIDE 100 MCG: 10 INJECTION INTRAVENOUS at 10:14

## 2024-05-01 RX ADMIN — PHENYLEPHRINE HYDROCHLORIDE 100 MCG: 10 INJECTION INTRAVENOUS at 08:24

## 2024-05-01 RX ADMIN — OXYCODONE HYDROCHLORIDE 10 MG: 5 TABLET ORAL at 22:24

## 2024-05-01 RX ADMIN — Medication 2 G: at 08:18

## 2024-05-01 RX ADMIN — HYDROMORPHONE HYDROCHLORIDE 0.4 MG: 0.2 INJECTION, SOLUTION INTRAMUSCULAR; INTRAVENOUS; SUBCUTANEOUS at 12:41

## 2024-05-01 RX ADMIN — PHENYLEPHRINE HYDROCHLORIDE 100 MCG: 10 INJECTION INTRAVENOUS at 09:34

## 2024-05-01 RX ADMIN — PANTOPRAZOLE SODIUM 40 MG: 40 TABLET, DELAYED RELEASE ORAL at 12:41

## 2024-05-01 RX ADMIN — PHENYLEPHRINE HYDROCHLORIDE 100 MCG: 10 INJECTION INTRAVENOUS at 08:43

## 2024-05-01 RX ADMIN — METOPROLOL TARTRATE 25 MG: 25 TABLET, FILM COATED ORAL at 14:31

## 2024-05-01 RX ADMIN — ONDANSETRON 4 MG: 2 INJECTION INTRAMUSCULAR; INTRAVENOUS at 11:14

## 2024-05-01 RX ADMIN — HYDROXYZINE HYDROCHLORIDE 10 MG: 10 TABLET ORAL at 12:44

## 2024-05-01 RX ADMIN — CEFAZOLIN 1 G: 1 INJECTION, POWDER, FOR SOLUTION INTRAMUSCULAR; INTRAVENOUS at 16:34

## 2024-05-01 RX ADMIN — BENZOCAINE 6 MG-MENTHOL 10 MG LOZENGES 1 LOZENGE: at 14:54

## 2024-05-01 RX ADMIN — LOSARTAN POTASSIUM 50 MG: 50 TABLET, FILM COATED ORAL at 14:31

## 2024-05-01 RX ADMIN — NORTRIPTYLINE HYDROCHLORIDE 50 MG: 50 CAPSULE ORAL at 21:29

## 2024-05-01 RX ADMIN — OXYBUTYNIN CHLORIDE 5 MG: 5 TABLET ORAL at 21:29

## 2024-05-01 RX ADMIN — BUPIVACAINE HYDROCHLORIDE IN DEXTROSE 1.8 ML: 7.5 INJECTION, SOLUTION SUBARACHNOID at 08:18

## 2024-05-01 RX ADMIN — SODIUM CHLORIDE, POTASSIUM CHLORIDE, SODIUM LACTATE AND CALCIUM CHLORIDE: 600; 310; 30; 20 INJECTION, SOLUTION INTRAVENOUS at 08:12

## 2024-05-01 RX ADMIN — FENTANYL CITRATE 50 MCG: 50 INJECTION, SOLUTION INTRAMUSCULAR; INTRAVENOUS at 11:04

## 2024-05-01 RX ADMIN — ALBUMIN HUMAN: 0.05 INJECTION, SOLUTION INTRAVENOUS at 09:04

## 2024-05-01 RX ADMIN — OXYCODONE HYDROCHLORIDE 5 MG: 5 TABLET ORAL at 16:34

## 2024-05-01 RX ADMIN — TRANEXAMIC ACID 1 G: 10 INJECTION, SOLUTION INTRAVENOUS at 10:01

## 2024-05-01 RX ADMIN — PROPOFOL 50 MG: 10 INJECTION, EMULSION INTRAVENOUS at 10:18

## 2024-05-01 RX ADMIN — PHENYLEPHRINE HYDROCHLORIDE 100 MCG: 10 INJECTION INTRAVENOUS at 10:31

## 2024-05-01 RX ADMIN — SODIUM CHLORIDE, POTASSIUM CHLORIDE, SODIUM LACTATE AND CALCIUM CHLORIDE: 600; 310; 30; 20 INJECTION, SOLUTION INTRAVENOUS at 09:56

## 2024-05-01 RX ADMIN — PHENYLEPHRINE HYDROCHLORIDE 0.5 MCG/KG/MIN: 10 INJECTION INTRAVENOUS at 08:18

## 2024-05-01 ASSESSMENT — ACTIVITIES OF DAILY LIVING (ADL)
ADLS_ACUITY_SCORE: 35
ADLS_ACUITY_SCORE: 33
ADLS_ACUITY_SCORE: 35

## 2024-05-01 NOTE — PROGRESS NOTES
"   05/01/24 1700   Appointment Info   Signing Clinician's Name / Credentials (PT) Yvette Castro DPT   Rehab Comments (PT) WBAT, No IR, No Add > midline   Living Environment   People in Home child(deng), adult   Current Living Arrangements house   Home Accessibility stairs to enter home;stairs within home   Number of Stairs, Main Entrance 2   Stair Railings, Main Entrance none   Number of Stairs, Within Home, Primary eight   Stair Railings, Within Home, Primary railing on left side (ascending)   Transportation Anticipated family or friend will provide   Living Environment Comments Pt lives in a house with her son, reports 2 AMRITA without a railing, then stairs up to her bedroom/bathroom.   Self-Care   Usual Activity Tolerance good   Current Activity Tolerance fair   Regular Exercise No   Equipment Currently Used at Home none   Fall history within last six months no   Activity/Exercise/Self-Care Comment Pt is typically IND without an AD at baseline for all ADL's and mobility   General Information   Onset of Illness/Injury or Date of Surgery 05/01/24   Referring Physician Zain Rodriguez MD   Patient/Family Therapy Goals Statement (PT) \"To go back home\"   Pertinent History of Current Problem (include personal factors and/or comorbidities that impact the POC) Pt is a 75 yo female who present s/p R MINDI on 5/1/2024.   Existing Precautions/Restrictions fall;weight bearing;no hip IR;no hip ADD past midline   Weight-Bearing Status - LUE full weight-bearing   Weight-Bearing Status - RUE full weight-bearing   Weight-Bearing Status - LLE full weight-bearing   Weight-Bearing Status - RLE weight-bearing as tolerated   Cognition   Affect/Mental Status (Cognition) WFL   Orientation Status (Cognition) oriented x 4   Follows Commands (Cognition) WFL   Pain Assessment   Patient Currently in Pain Yes, see Vital Sign flowsheet  (Pt reports pain in neck, R hip, and headache)   Integumentary/Edema   Integumentary/Edema no deficits were " identifed   Posture    Posture Forward head position;Protracted shoulders   Range of Motion (ROM)   Range of Motion ROM deficits secondary to surgical procedure;ROM deficits secondary to pain;ROM deficits secondary to swelling;ROM deficits secondary to weakness   Strength (Manual Muscle Testing)   Strength (Manual Muscle Testing) Able to perform R SLR;Able to perform L SLR;Deficits observed during functional mobility   Bed Mobility   Comment, (Bed Mobility) Supine>sitting EOB completed w/ Min Ax1   Transfers   Comment, (Transfers) Sit>stand completed w/ CGA   Gait/Stairs (Locomotion)   Comment, (Gait/Stairs) Pt ambulated ~60 ft w/ FWW and CGA   Balance   Balance other (describe)   Balance Comments Pt demonstrated good sitting balance, required FWW for all dynamic balance   Sensory Examination   Sensory Perception patient reports no sensory changes   Coordination   Coordination no deficits were identified   Muscle Tone   Muscle Tone no deficits were identified   Clinical Impression   Criteria for Skilled Therapeutic Intervention Yes, treatment indicated   PT Diagnosis (PT) Impaired functional mobility   Influenced by the following impairments Post-surgical pain and precautiosn, weakness, impaired activity tolerance   Functional limitations due to impairments Impaired gait and inability to complete ADL's   Clinical Presentation (PT Evaluation Complexity) stable   Clinical Presentation Rationale Clinical judgment   Clinical Decision Making (Complexity) low complexity   Planned Therapy Interventions (PT) balance training;bed mobility training;gait training;home exercise program;motor coordination training;neuromuscular re-education;patient/family education;postural re-education;ROM (range of motion);stair training;strengthening;stretching;transfer training;progressive activity/exercise;home program guidelines;risk factor education   Risk & Benefits of therapy have been explained evaluation/treatment results  reviewed;care plan/treatment goals reviewed;risks/benefits reviewed;current/potential barriers reviewed;participants voiced agreement with care plan;participants included;patient   PT Total Evaluation Time   PT Eval, Low Complexity Minutes (63533) 10   Physical Therapy Goals   PT Frequency Daily   PT Predicted Duration/Target Date for Goal Attainment 05/08/24   PT Goals Bed Mobility;Transfers;Gait;Stairs   PT: Bed Mobility Independent;Supine to/from sit;Rolling;Bridging;Within precautions   PT: Transfers Modified independent;Sit to/from stand;Bed to/from chair;Assistive device;Within precautions   PT: Gait Modified independent;Rolling walker;Greater than 200 feet;Within precautions   PT: Stairs Modified independent;8 stairs;Rail on left;Assistive device;Within precautions   Interventions   Interventions Quick Adds Gait Training;Therapeutic Activity;Therapeutic Procedure   Therapeutic Procedure/Exercise   Ther. Procedure: strength, endurance, ROM, flexibillity Minutes (19475) 2   Treatment Detail/Skilled Intervention Pt instructed in and completed ankle pumps in order to promote increase LE circulation.   Therapeutic Activity   Therapeutic Activities: dynamic activities to improve functional performance Minutes (06795) 12   Symptoms Noted During/After Treatment Increased pain   Treatment Detail/Skilled Intervention Evaluation completed and treatment indicated. Pt educated on precautions and WBAT on R LE. Supine>sitting EOB completed w/ Min Ax1, cues given for technique in order to maintain hip precautions throughout. Sit>stand completed w/ CGA, cues given to push up from bed rather than pull on FWW. Pt completed toliet transfer w/ CGA, able to complete pericares on own. Post-ambulation, pt educated on walking program, icing, and discharge  planning. Pt completed sit>supine w/ CGA, cues given for positioning in bed. Pt left supine in bed with all needs in reach and bed alarm on, RN notified.   Gait Training   Gait  Training Minutes (10119) 10   Symptoms Noted During/After Treatment (Gait Training) increased pain;fatigue   Treatment Detail/Skilled Intervention Pt ambulated ~60 ft w/ FWW and CGA, cues given for walker proximity, upright posture, and increased step length. Pt initially demonstrated antalgic gait, improved with further ambulation. No overt LOB, pt reported main limitation was neck soreness this session.   Distance in Feet ~60 ft   [place] Level (Gait Training) contact guard   Physical Assistance Level (Gait Training) 1 person assist;verbal cues   Weight Bearing (Gait Training) weight-bearing as tolerated   Assistive Device (Gait Training) rolling walker   PT Discharge Planning   PT Plan Progress gait distance w/ FWW, trial stairs as able, LE exercises (handout iin room alrready)   PT Discharge Recommendation (DC Rec)   (Defer to Ortho)   PT Rationale for DC Rec Pt is moving below baseline mobility, mainly limited by R hip pain, neck pain and headache from positioning in surgery at this time. Anticipate with further pain managment and IP PT in order to address stairs that pt will be safe to return home with assist of son and usage of FWW for all functional mobility.   PT Brief overview of current status Min Ax1 bed mobility, CGA w/ FWW   PT Equipment Needed at Discharge walker, rolling   Total Session Time   Timed Code Treatment Minutes 24   Total Session Time (sum of timed and untimed services) 34

## 2024-05-01 NOTE — ANESTHESIA PROCEDURE NOTES
"Intrathecal Procedure Note    Pre-Procedure   Staff -        Anesthesiologist:  Prem Corbin MD       Performed By: anesthesiologist       Location: OR       Pre-Anesthestic Checklist: patient identified, IV checked, site marked, risks and benefits discussed, informed consent, monitors and equipment checked, pre-op evaluation and at physician/surgeon's request  Timeout:       Correct Patient: Yes        Correct Procedure: Yes        Correct Site: Yes        Correct Position: Yes   Procedure Documentation  Procedure: intrathecal       Patient Position: sitting       Patient Prep/Sterile Barriers: sterile gloves, mask, patient draped       Skin prep: Chloraprep       Insertion Site: L2-3. (midline approach).       Needle Gauge: 24.        Needle Length (Inches): 4        Spinal Needle Type: Pencan       Introducer used       Introducer: 20 G       # of attempts: 1 and  # of redirects:  1    Assessment/Narrative         Paresthesias: No.       CSF fluid: clear.       Opening pressure was cmH2O while  Sitting.       Comments:  Patient tolerated the procedure well      FOR [employer] ([facility]) ONLY:   Pain Team Contact information: please page the Pain Team Via [employer]. Search \"Pain\". During daytime hours, please page the attending first. At night please page the resident first.      "

## 2024-05-01 NOTE — ANESTHESIA POSTPROCEDURE EVALUATION
Patient: Ania Amsal    Procedure: Procedure(s):  Right total hip arthroplasty       Anesthesia Type:  Spinal    Note:  Disposition: Admission   Postop Pain Control: Uneventful            Sign Out: Well controlled pain   PONV: No   Neuro/Psych: Uneventful            Sign Out: Acceptable/Baseline neuro status   Airway/Respiratory: Uneventful            Sign Out: Acceptable/Baseline resp. status   CV/Hemodynamics: Uneventful            Sign Out: Acceptable CV status   Other NRE: NONE   DID A NON-ROUTINE EVENT OCCUR? No           Last vitals:  Vitals Value Taken Time   /61 05/01/24 1145   Temp 36.4  C (97.6  F) 05/01/24 1130   Pulse 70 05/01/24 1151   Resp 16 05/01/24 1151   SpO2 99 % 05/01/24 1151   Vitals shown include unfiled device data.    Electronically Signed By: Prem Corbin MD  May 1, 2024  4:06 PM

## 2024-05-01 NOTE — BRIEF OP NOTE
[facility]    Brief Operative Note    Pre-operative diagnosis: Osteoarthritis of right hip [M16.11]  Post-operative diagnosis Same as pre-operative diagnosis    Procedure: Right total hip arthroplasty, Right - Hip    Surgeon: Surgeons and Role:     * Zain Rodriguez MD - Primary     ASST,  lalito Vides RN  FA  Anesthesia: Choice   Estimated Blood Loss: see log    Drains: None  Specimens: * No specimens in log *  Findings:   None.  Complications: None.  Implants: * No implants in log *
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
## 2024-05-01 NOTE — PROGRESS NOTES
Patient arrived from Recovery room.  Oriented to room/unit.  A&O, able to make needs known.  Severe pain on surgical site, ice pack applied.  Ortho Stoplight Tool discussed w/ pt.

## 2024-05-01 NOTE — OP NOTE
Op note    Patient's name,  Ania russell  Medical record number-[id number]    [facility] number-[id number]    Date of surgery 5/1/2024.        Pre-Op diagnoses: Severe arthritis of both hips postop diagnoses same    Procedure right total hip arthroplasty with MIS technique.        Surgeon Zain Rodriguez  <MD    First Assistant Ovidio March RN FA    Anesthesia-spinal anesthetic    Medications-2 g Ancef given preop plan 24 dosing postop    DVT prophylaxis aspirin therapy sequential Compression devices ISHMAEL hose early mobilization.    Other medications tranexamic acid place IV 1 g preop 1 g postop    Other medications 1 g powdered vancomycin placed intra-articularly.    100 cc of the [employer] pharmacy pain cocktail bupivacaine tramadol saline.    Drains none    Catheters none    Implant DePuy Galion cup size 50 stem size 5 high offset.  2 screws 130 mm 125 mm long liner with a neutral liner for 50 cup for 32 head.    Complications none    Procedure patient brought in the room after identifying her in the holding area.  Brief was discussed with the staff and bit in the operating room transferred to the OR table anesthesia performed a spinal anesthetic without problem.  We then rolled her to the right with the left lateral decubitus right hip up position.  Prepped draped usual fashion axillary roll was positioned and monitored by anesthesia.  And proceeded to prepped and draped usual fashion once we secure the area with sterile technique outline the incision planned.  Timeout was requested all team members agreed identify the consent reviewed x-rays up in the computer discussed the medications in the room sequentially what what we can use and when and why.  No other problems were noted.  Over agreed we proceeded then to an incision about 8 cm in line with the gluteus medius insertion point in the abdominal aspect lateral aspect of the tib-fib femur.  Sharp dissection was carried out exposed.  Took down the quadratus  tagged with #2 Ethibond #5 FiberWire was placed into the short external rotators plus the piriformis reflected them posteriorly suture the capsule to that up to opening that up and carefully T opening fashion.  Dislocated the hip out right basilar compression And completed expanded itself on top of probably a congenital hip issue.  Resected had about 1 fingerbreadth above the above the lesser trochanter.  Cleaned this up thoroughly osteophytes trial liner was placed in there just to get orientation for anteversion she seems to be slightly more anteverted than the average person and had a varus neck.    At that point then brought the femoral neck up in the wound using the  femral neck elevator reamed to rasps appropriately.  Using it down the side size 5 seem to be very reasonable.    Then proceeded to 2 through a series of trials very happy with the stability of it.  No notching or fracture lines could be visualized.  Although trials out there been injecting lidocaine Marcaine mixture but diffusely.  Put 20,000 units of thrombin in the acetabulum soaked in the sponge and held compressive there.  No complications once removed minimal bleeding encountered.  Were sterilely out was sent Betadine and saline.    Proceeded with definitive placement of implants in the cup and 2 screws in it for the 50 mm cup throughout the liner was fixed and chosen to have the neutral alignment.  Stem was a +5 high offset.    No complications Dr. Rodriguez dictating thank you everything was closed up nicely.  Put vancomycin was placed in the joint closing was initiated with closed over the deep fascia in the muscles.  Was ago that close have added some strata fix.  But the short external rotators and the piriformis through drill holes in the coracoid process.    Clean things up there thoroughly trials with excellent stability went back and took a bit more anterior bone off the notch the acetabular was appropriately marked huge osteophytes are  present almost almost circumferentially and irrigated from the begin injecting it 1 to 2 mm increments the local anesthetic and Toradol.  Will check for bleeding.  Thoroughly washed it out.  Once that was has had been that made sure the permanent components in the stem was placed and there did not seem to be any excessive anteversion she is very short stature the house that I will give us better stability maybe to see but uncomfortable for him but I think we have to go with stability.    Once that was done put her vancomycin as placed in the joint did not feel any joint with drain was needed just had some minimal blood loss.  Closure deep fascia interrupted 0 Ethibond followed by running #1 strata fix.  0 Vicryl 2-0 Vicryl 3-0 Monocryl and Dermabond.    Patient was transferred awake alert recovery sponge and needle counts were correct x 2 and second tranexamic acid acid was given the end the case all sponges were accounted for parts trials etc.  We then transferred her awake alert recovery satisfactory condition thank you

## 2024-05-01 NOTE — ANESTHESIA CARE TRANSFER NOTE
Patient: Ania Castillo    Procedure: Procedure(s):  Right total hip arthroplasty       Diagnosis: Osteoarthritis of right hip [M16.11]  Diagnosis Additional Information: No value filed.    Anesthesia Type:   Spinal     Note:    Oropharynx: oropharynx clear of all foreign objects and spontaneously breathing  Level of Consciousness: awake  Oxygen Supplementation: face mask  Level of Supplemental Oxygen (L/min / FiO2): 6  Independent Airway: airway patency satisfactory and stable  Dentition: dentition unchanged  Vital Signs Stable: post-procedure vital signs reviewed and stable  Report to RN Given: handoff report given  Patient transferred to: PACU    Handoff Report: Identifed the Patient, Identified the Reponsible Provider, Reviewed the pertinent medical history, Discussed the surgical course, Reviewed Intra-OP anesthesia mangement and issues during anesthesia, Set expectations for post-procedure period and Allowed opportunity for questions and acknowledgement of understanding      Vitals:  Vitals Value Taken Time   /56 05/01/24 1035   Temp     Pulse 76 05/01/24 1039   Resp 25 05/01/24 1039   SpO2 97 % 05/01/24 1039   Vitals shown include unfiled device data.    Electronically Signed By: MIQUEL Caal CRNA  May 1, 2024  10:41 AM

## 2024-05-01 NOTE — BRIEF OP NOTE
[facility]    Brief Operative Note    Pre-operative diagnosis: Osteoarthritis of right hip [M16.11]  Post-operative diagnosis Same as pre-operative diagnosis    Procedure: Right total hip arthroplasty, Right - Hip    Surgeon: Surgeons and Role:     * Zain Rodriguez MD - Primary    Asst   lalito Torres  Anesthesia: Choice   Estimated Blood Loss: Less than 100 ml    Drains: None  Specimens: * No specimens in log *  Findings:   None.  Complications: None  .  Implants:   Implant Name Type Inv. Item Serial No.  Lot No. LRB No. Used Action   IMP CUP ACE PINNACLE 50MM 1217- - [id number] Total Joint Component/Insert IMP CUP ACE PINNACLE 50MM 1217-  [employer]- [id number] Right 1 Implanted   IMP SCR BONE CAN ACE 6.5X30MM 1217- - [id number] Metallic Hardware/[place] IMP SCR BONE CAN ACE 6.5X30MM 1217-  [employer]- [id number] Right 1 Implanted   IMP SCR BONE CAN ACE 6.5X25MM 1217- - [id number] Metallic Hardware/[place] IMP SCR BONE CAN ACE 6.5X25MM 1217-  [employer]- [id number] Right 1 Implanted   IMP LINER HIP DEPUY PINNACLE ALTRX [id number] +4 1221- - [id number] Total Joint Component/Insert IMP LINER HIP DEPUY PINNACLE ALTRX [id number] +4 1221-  [employer]- [id number] Right 1 Implanted   IMP STEM FEM HIP DEPUY [place] TPR SZ 5 HI OFF 1570- - [id number] Total Joint Component/Insert IMP STEM FEM HIP DEPUY [place] TPR SZ 5 HI OFF 1570-  [employer]- [id number] Right 1 Implanted   IMP HEAD FEMORAL DEPUY 32MM +1 1365- - [id number] Total Joint Component/Insert IMP HEAD FEMORAL DEPUY 32MM +1 1365-  [employer]- [id number] Right 1 Implanted

## 2024-05-02 ENCOUNTER — APPOINTMENT (OUTPATIENT)
Dept: OCCUPATIONAL THERAPY | Facility: CLINIC | Age: 74
DRG: 469 | End: 2024-05-02
Attending: ORTHOPAEDIC SURGERY
Payer: MEDICARE

## 2024-05-02 ENCOUNTER — APPOINTMENT (OUTPATIENT)
Dept: PHYSICAL THERAPY | Facility: CLINIC | Age: 74
DRG: 469 | End: 2024-05-02
Attending: ORTHOPAEDIC SURGERY
Payer: MEDICARE

## 2024-05-02 LAB
ANION GAP SERPL CALCULATED.3IONS-SCNC: 11 MMOL/L (ref 7–15)
BASOPHILS # BLD AUTO: 0 10E3/UL (ref 0–0.2)
BASOPHILS NFR BLD AUTO: 0 %
BUN SERPL-MCNC: 14.7 MG/DL (ref 8–23)
CALCIUM SERPL-MCNC: 8.4 MG/DL (ref 8.8–10.2)
CHLORIDE SERPL-SCNC: 103 MMOL/L (ref 98–107)
CREAT SERPL-MCNC: 0.77 MG/DL (ref 0.51–0.95)
DEPRECATED HCO3 PLAS-SCNC: 23 MMOL/L (ref 22–29)
EGFRCR SERPLBLD CKD-EPI 2021: 80 ML/MIN/1.73M2
EOSINOPHIL # BLD AUTO: 0.1 10E3/UL (ref 0–0.7)
EOSINOPHIL NFR BLD AUTO: 1 %
ERYTHROCYTE [DISTWIDTH] IN BLOOD BY AUTOMATED COUNT: 13 % (ref 10–15)
GLUCOSE BLDC GLUCOMTR-MCNC: 106 MG/DL (ref 70–99)
GLUCOSE BLDC GLUCOMTR-MCNC: 134 MG/DL (ref 70–99)
GLUCOSE SERPL-MCNC: 152 MG/DL (ref 70–99)
GLUCOSE SERPL-MCNC: 152 MG/DL (ref 70–99)
HCT VFR BLD AUTO: 28.2 % (ref 35–47)
HGB BLD-MCNC: 9.1 G/DL (ref 11.7–15.7)
IMM GRANULOCYTES # BLD: 0 10E3/UL
IMM GRANULOCYTES NFR BLD: 0 %
LYMPHOCYTES # BLD AUTO: 1.1 10E3/UL (ref 0.8–5.3)
LYMPHOCYTES NFR BLD AUTO: 11 %
MCH RBC QN AUTO: 29 PG (ref 26.5–33)
MCHC RBC AUTO-ENTMCNC: 32.3 G/DL (ref 31.5–36.5)
MCV RBC AUTO: 90 FL (ref 78–100)
MONOCYTES # BLD AUTO: 0.4 10E3/UL (ref 0–1.3)
MONOCYTES NFR BLD AUTO: 4 %
NEUTROPHILS # BLD AUTO: 8.3 10E3/UL (ref 1.6–8.3)
NEUTROPHILS NFR BLD AUTO: 84 %
NRBC # BLD AUTO: 0 10E3/UL
NRBC BLD AUTO-RTO: 0 /100
PLATELET # BLD AUTO: 201 10E3/UL (ref 150–450)
POTASSIUM SERPL-SCNC: 4 MMOL/L (ref 3.4–5.3)
RBC # BLD AUTO: 3.14 10E6/UL (ref 3.8–5.2)
SODIUM SERPL-SCNC: 137 MMOL/L (ref 135–145)
WBC # BLD AUTO: 9.9 10E3/UL (ref 4–11)

## 2024-05-02 PROCEDURE — 250N000011 HC RX IP 250 OP 636: Performed by: STUDENT IN AN ORGANIZED HEALTH CARE EDUCATION/TRAINING PROGRAM

## 2024-05-02 PROCEDURE — 97530 THERAPEUTIC ACTIVITIES: CPT | Mod: GP | Performed by: PHYSICAL THERAPY ASSISTANT

## 2024-05-02 PROCEDURE — 250N000013 HC RX MED GY IP 250 OP 250 PS 637: Performed by: ORTHOPAEDIC SURGERY

## 2024-05-02 PROCEDURE — 99232 SBSQ HOSP IP/OBS MODERATE 35: CPT | Performed by: STUDENT IN AN ORGANIZED HEALTH CARE EDUCATION/TRAINING PROGRAM

## 2024-05-02 PROCEDURE — 97535 SELF CARE MNGMENT TRAINING: CPT | Mod: GO

## 2024-05-02 PROCEDURE — 97116 GAIT TRAINING THERAPY: CPT | Mod: GP | Performed by: PHYSICAL THERAPY ASSISTANT

## 2024-05-02 PROCEDURE — 36415 COLL VENOUS BLD VENIPUNCTURE: CPT | Performed by: ORTHOPAEDIC SURGERY

## 2024-05-02 PROCEDURE — 97165 OT EVAL LOW COMPLEX 30 MIN: CPT | Mod: GO

## 2024-05-02 PROCEDURE — 120N000001 HC R&B MED SURG/OB

## 2024-05-02 PROCEDURE — 250N000011 HC RX IP 250 OP 636: Performed by: ORTHOPAEDIC SURGERY

## 2024-05-02 PROCEDURE — 85025 COMPLETE CBC W/AUTO DIFF WBC: CPT

## 2024-05-02 PROCEDURE — 258N000003 HC RX IP 258 OP 636: Performed by: STUDENT IN AN ORGANIZED HEALTH CARE EDUCATION/TRAINING PROGRAM

## 2024-05-02 PROCEDURE — 80048 BASIC METABOLIC PNL TOTAL CA: CPT | Performed by: ORTHOPAEDIC SURGERY

## 2024-05-02 PROCEDURE — 97110 THERAPEUTIC EXERCISES: CPT | Mod: GP | Performed by: PHYSICAL THERAPY ASSISTANT

## 2024-05-02 RX ORDER — PIPERACILLIN SODIUM, TAZOBACTAM SODIUM 3; .375 G/15ML; G/15ML
3.38 INJECTION, POWDER, LYOPHILIZED, FOR SOLUTION INTRAVENOUS EVERY 6 HOURS
Status: DISCONTINUED | OUTPATIENT
Start: 2024-05-02 | End: 2024-05-05 | Stop reason: HOSPADM

## 2024-05-02 RX ORDER — HYDROCODONE BITARTRATE AND ACETAMINOPHEN 5; 325 MG/1; MG/1
1 TABLET ORAL EVERY 4 HOURS PRN
Status: DISCONTINUED | OUTPATIENT
Start: 2024-05-02 | End: 2024-05-04

## 2024-05-02 RX ORDER — SODIUM CHLORIDE, SODIUM LACTATE, POTASSIUM CHLORIDE, CALCIUM CHLORIDE 600; 310; 30; 20 MG/100ML; MG/100ML; MG/100ML; MG/100ML
INJECTION, SOLUTION INTRAVENOUS CONTINUOUS
Status: DISCONTINUED | OUTPATIENT
Start: 2024-05-02 | End: 2024-05-03

## 2024-05-02 RX ADMIN — ONDANSETRON 4 MG: 4 TABLET, ORALLY DISINTEGRATING ORAL at 10:06

## 2024-05-02 RX ADMIN — ASPIRIN 81 MG: 81 TABLET, COATED ORAL at 20:34

## 2024-05-02 RX ADMIN — ACETAMINOPHEN 975 MG: 325 TABLET, FILM COATED ORAL at 12:18

## 2024-05-02 RX ADMIN — ASPIRIN 81 MG: 81 TABLET, COATED ORAL at 08:13

## 2024-05-02 RX ADMIN — ACETAMINOPHEN 975 MG: 325 TABLET, FILM COATED ORAL at 20:34

## 2024-05-02 RX ADMIN — CEFAZOLIN 1 G: 1 INJECTION, POWDER, FOR SOLUTION INTRAMUSCULAR; INTRAVENOUS at 00:20

## 2024-05-02 RX ADMIN — ACETAMINOPHEN 975 MG: 325 TABLET, FILM COATED ORAL at 06:28

## 2024-05-02 RX ADMIN — SIMVASTATIN 80 MG: 40 TABLET, FILM COATED ORAL at 21:58

## 2024-05-02 RX ADMIN — OXYBUTYNIN CHLORIDE 5 MG: 5 TABLET ORAL at 08:13

## 2024-05-02 RX ADMIN — POLYETHYLENE GLYCOL 3350 17 G: 17 POWDER, FOR SOLUTION ORAL at 08:15

## 2024-05-02 RX ADMIN — SODIUM CHLORIDE, POTASSIUM CHLORIDE, SODIUM LACTATE AND CALCIUM CHLORIDE: 600; 310; 30; 20 INJECTION, SOLUTION INTRAVENOUS at 17:49

## 2024-05-02 RX ADMIN — OXYCODONE HYDROCHLORIDE 5 MG: 5 TABLET ORAL at 13:41

## 2024-05-02 RX ADMIN — OXYBUTYNIN CHLORIDE 5 MG: 5 TABLET ORAL at 14:35

## 2024-05-02 RX ADMIN — EZETIMIBE 10 MG: 10 TABLET ORAL at 08:12

## 2024-05-02 RX ADMIN — OXYCODONE HYDROCHLORIDE 5 MG: 5 TABLET ORAL at 08:13

## 2024-05-02 RX ADMIN — BENZOCAINE 6 MG-MENTHOL 10 MG LOZENGES 1 LOZENGE: at 08:25

## 2024-05-02 RX ADMIN — SODIUM CHLORIDE 500 ML: 9 INJECTION, SOLUTION INTRAVENOUS at 14:45

## 2024-05-02 RX ADMIN — ONDANSETRON 4 MG: 4 TABLET, ORALLY DISINTEGRATING ORAL at 17:29

## 2024-05-02 RX ADMIN — NORTRIPTYLINE HYDROCHLORIDE 50 MG: 50 CAPSULE ORAL at 21:58

## 2024-05-02 RX ADMIN — SENNOSIDES AND DOCUSATE SODIUM 1 TABLET: 50; 8.6 TABLET ORAL at 08:13

## 2024-05-02 RX ADMIN — BENZOCAINE 6 MG-MENTHOL 10 MG LOZENGES 1 LOZENGE: at 20:41

## 2024-05-02 RX ADMIN — OXYBUTYNIN CHLORIDE 5 MG: 5 TABLET ORAL at 21:58

## 2024-05-02 RX ADMIN — PIPERACILLIN AND TAZOBACTAM 3.38 G: 3; .375 INJECTION, POWDER, FOR SOLUTION INTRAVENOUS at 17:29

## 2024-05-02 RX ADMIN — SENNOSIDES AND DOCUSATE SODIUM 1 TABLET: 50; 8.6 TABLET ORAL at 20:34

## 2024-05-02 RX ADMIN — PANTOPRAZOLE SODIUM 40 MG: 40 TABLET, DELAYED RELEASE ORAL at 08:14

## 2024-05-02 ASSESSMENT — ACTIVITIES OF DAILY LIVING (ADL)
ADLS_ACUITY_SCORE: 35
ADLS_ACUITY_SCORE: 38
IADL_COMMENTS: IND WITH IADLS
ADLS_ACUITY_SCORE: 35
ADLS_ACUITY_SCORE: 35
ADLS_ACUITY_SCORE: 39
PREVIOUS_RESPONSIBILITIES: MEAL PREP;HOUSEKEEPING;LAUNDRY;SHOPPING;YARDWORK;MEDICATION MANAGEMENT;FINANCES;DRIVING
ADLS_ACUITY_SCORE: 39
ADLS_ACUITY_SCORE: 39
ADLS_ACUITY_SCORE: 35
ADLS_ACUITY_SCORE: 38
ADLS_ACUITY_SCORE: 35
ADLS_ACUITY_SCORE: 39
ADLS_ACUITY_SCORE: 38
ADLS_ACUITY_SCORE: 35
ADLS_ACUITY_SCORE: 39

## 2024-05-02 NOTE — PROGRESS NOTES
[facility]    Medicine Progress Note - Hospitalist Service    Date of Admission:  5/1/2024    Assessment & Plan   Ania Castillo is a 74 year old female with a PMH significant for CAD, HTN, HLD, DM2, depression, anxiety, and GERD admitted on 5/1/2024 for an elective right total hip arthoplasty. Hospitalist was consulted for co-medical management in s/p MINDI.     Osteoarthritis  S/p right total hip arthoplasty  Had R MINDI with Dr. Rodriguez 5/1 performed under general anesthesia. No intraoperative complications.  - Defer post-operative cares including analgesia, anticoagulation, perioperative antibiotics and PT/OT to orthopedic surgery     Aspiration pneumonitis   Patient had aspiration event intraoperatively. CXR immediately following demonstrated patchy left lung opacity consistent with aspiration pneumonitis.    - no oxygen needs however patient is coughing and still vomiting  - given her aspiration event with ongoing nausea, probably best to monitor in the hospital until tomorrow    Coronary Artery Disease  Hyperlipidemia  Hypertension  PTA regimen includes losartan 25 mg daily, metoprolol 25 mg BID, Simvastatin 80 mg daily, and ezetimibe 10 mg daily.  - Continue PTA simvastatin and Zetia  - Continue PTA metoprolol with hold parameters  - will give fluid bolus for low BP notable this afternoon, continue to hold losartan, metoprolol resumed with parameters     DM type 2  Last HgbA1c 6.1. Glucose has been well controlled today  - Glucose checks BID and HS     Depression  Anxiety  - Continue PTA nortriptyline     GERD  - Continue PTA omeprazole    Iron deficiency anemia  - stable, OP work up            Clinically Significant Risk Factors                  # Hypertension: Noted on problem list                   Disposition Plan     Medically Ready for Discharge: Anticipated Tomorrow             Tracy Bravo DO  Hospitalist Service  [facility]  Securely message with  [employer] (more info)  Text page via [facility] Paging/Directory   ______________________________________________________________________    Interval History   Patient getting back from the bathroom family had just entered the room. She reports some pain in her hip. She had mucus in her throat and did feel the need to cough. NO fever or chills. No SOB. No nausea.    ON follow up discussion with nursing patient with a few vomiting episodes during the day    Physical Exam   Vital Signs: Temp: 98.7  F (37.1  C) Temp src: Oral BP: 97/42 Pulse: 75   Resp: 16 SpO2: 98 % O2 Device: None (Room air)    Weight: 125 lbs 0 oz    Constitutional: Awake, alert, cooperative, no apparent distress  Respiratory: crackles in LLL, no wheezing  Cardiovascular: Regular rate and rhythm, normal S1 and S2, and no murmur noted  GI: Normal bowel sounds, soft, non-distended, non-tender  Skin/Integumen: No rashes, no cyanosis, no edema  Other:      Medical Decision Making       45 MINUTES SPENT BY ME on the date of service doing chart review, history, exam, documentation & further activities per the note.      Data     I have personally reviewed the following data over the past 24 hrs:    9.9  \   9.1 (L)   / 201     137 103 14.7 /  152 (H); 152 (H)   4.0 23 0.77 \       Imaging results reviewed over the past 24 hrs:   No results found for this or any previous visit (from the past 24 hour(s)).

## 2024-05-02 NOTE — PROGRESS NOTES
05/02/24 0800   Appointment Info   Signing Clinician's Name / Credentials (OT) Edita Sagluero OTR/L   Rehab Comments (OT) WBAT, No IR, No Add > midline   Living Environment   People in Home child(deng), adult   Current Living Arrangements house   Home Accessibility stairs to enter home;stairs within home   Number of Stairs, Main Entrance 2   Stair Railings, Main Entrance none   Number of Stairs, Within Home, Primary eight   Stair Railings, Within Home, Primary railing on left side (ascending)   Transportation Anticipated family or friend will provide   Living Environment Comments Pt lives in a house with her son, reports 2 AMRITA without a railing, then stairs up to her bedroom/bathroom. Walk in shower, no grab bars/shower chair. Son works during the day, can take FMLA if needed. Daughter out of town until the 7th, then pt will be with daughter.   Self-Care   Usual Activity Tolerance good   Current Activity Tolerance fair   Regular Exercise No   Equipment Currently Used at Home none   Fall history within last six months no   Activity/Exercise/Self-Care Comment Pt is typically IND without an AD at baseline for all ADL's and mobility   Instrumental Activities of Daily Living (IADL)   Previous Responsibilities meal prep;housekeeping;laundry;shopping;yardwork;medication management;finances;driving   IADL Comments ind with IADLs   General Information   Onset of Illness/Injury or Date of Surgery 05/16/24   Referring Physician Zain Rodriguez MD   Patient/Family Therapy Goal Statement (OT) To recover   Additional Occupational Profile Info/Pertinent History of Current Problem Right total hip arthroplasty   Existing Precautions/Restrictions fall;no hip ADD past midline;no hip IR   Right Lower Extremity (Weight-bearing Status) weight-bearing as tolerated (WBAT)   Cognitive Status Examination   Orientation Status orientation to person, place and time   Visual Perception   Visual Impairment/Limitations   (contacts)   Sensory    Sensory Quick Adds sensation intact   Pain Assessment   Patient Currently in Pain Yes, see Vital Sign flowsheet   Posture   Posture not impaired   Range of Motion Comprehensive   Comment, General Range of Motion LE impaired secondary to MINDI   Strength Comprehensive (MMT)   General Manual Muscle Testing (MMT) Assessment no strength deficits identified   Muscle Tone Assessment   Muscle Tone Quick Adds No deficits were identified   Coordination   Upper Extremity Coordination No deficits were identified   Bed Mobility   Comment (Bed Mobility) Min A x 1/ mod I, increased time   Transfers   Transfer Comments STS close CGA/walker and VCs   Balance   Balance Comments Rec use of AD (walker) at discharge   Activities of Daily Living   BADL Assessment/Intervention bathing;upper body dressing;lower body dressing;grooming;toileting   Bathing Assessment/Intervention   Position (Bathing) supported sitting   Mitchell Level (Bathing) minimum assist (75% patient effort)   Comment, (Bathing) Per clinical jugment   Assistive Devices (Bathing) shower chair   Upper Body Dressing Assessment/Training   Comment, (Upper Body Dressing) Per clinical jugment   Mitchell Level (Upper Body Dressing) set up   Lower Body Dressing Assessment/Training   Assistive Devices (Lower Body Dressing) long-handled shoe horn;reacher;sock-aid   Mitchell Level (Lower Body Dressing) set up;minimum assist (75% patient effort);modified independence   Grooming Assessment/Training   Mitchell Level (Grooming) modified independence;supervision   Toileting   Mitchell Level (Toileting) modified independence;supervision;verbal cues;contact guard assist   Clinical Impression   Criteria for Skilled Therapeutic Interventions Met (OT) Yes, treatment indicated   OT Diagnosis Decreased ind secondary to MINDI   OT Problem List-Impairments impacting ADL problems related to;activity tolerance impaired;balance;mobility;pain;post-surgical precautions   Planned  Therapy Interventions (OT) ADL retraining;transfer training;home program guidelines;progressive activity/exercise;risk factor education   Clinical Decision Making Complexity (OT) problem focused assessment/low complexity   Risk & Benefits of therapy have been explained care plan/treatment goals reviewed;evaluation/treatment results reviewed;risks/benefits reviewed;current/potential barriers reviewed;participants voiced agreement with care plan;participants included;patient;son   Clinical Impression Comments Decreased ind secondary to MINDI. Pt would benefit from skilled IP OT to resume ind with I/ADLs during recovyer.   OT Total Evaluation Time   OT Eval, Low Complexity Minutes (02113) 6   OT Goals   Therapy Frequency (OT) Daily   OT Predicted Duration/Target Date for Goal Attainment 05/16/24   OT Goals Hygiene/Grooming;Lower Body Dressing;Lower Body Bathing;Transfers;Toilet Transfer/Toileting;OT Goal 1   OT: Hygiene/Grooming modified independent   OT: Lower Body Dressing Modified independent;including set-up/clothing retrieval;using adaptive equipment   OT: Lower Body Bathing Modified independent;using adaptive equipment   OT: Transfer Modified independent;within precautions  (shower transfer (walk in with shower chair))   OT: Toilet Transfer/Toileting Modified independent   OT: Goal 1 Pt will verbalize 100% accuracy of hip precautions (no IR, no ADD past midline) to ensure safe resumption of daily life activities at discharge   Interventions   Interventions Quick Adds Self-Care/Home Management   Self-Care/Home Management   Self-Care/Home Mgmt/ADL, Compensatory, Meal Prep Minutes (58196) 40   Symptoms Noted During/After Treatment (Meal Preparation/Planning Training) other (see comments);increased pain;fatigue  (nausea)   Treatment Detail/Skilled Intervention Pt greeted for OT session, receptive to participating following [facility] on role and introduction. Son present mid-session, whom pt lives with. Son highly invovled  and edu provided to son regarding anticipated need for raised toilet seat (problem solving regarding set up of bathroom - limited thing to hold onto during transfer), shower chair (son has already purchased), and AE for ind with LB dressing. Verbalized agreement. Edu on purchasing. Bed mobility with min A/mod I and increased time to manage pain. STS with close CGA/walker. Pt slowly moving, verbalized feeling as though her R hip is higher than the L post surgery. Ambulation 15 ft x 2 this date with CGA/walker. Toilet transfer with edu on slow transfer with use of grab bar for increased safety. SBA/mod I and increased time. Ind with toilet hygiene. G/h tasks in standing with SBA/walker, edu to bring walker up to sink. Stated this would work in bathroom set up. Pt returned to chair, however, feet not touching floor, re-assessed and had pt sit EOB to maximize stability during LB dressing wit AE. Edu on sock aide, reacher, and long handled shoe horn. Pt required some assistance to recall steps involved in use of equipment. Visual/verbal demo, and then min-mod A and cuing throughout tasks. Donned/doffed socks, underwear, and pants. Pt returned to chair. PT present, however, at time, pt nauseoous and vomiting. PT to return. All needs met and edu on POC. Pt/son thanking for time.   OT Discharge Planning   OT Plan Review RTS options (online visuals), review use of AE for LB dressing. Toileting with RTS. Shower transfer simulation.   OT Discharge Recommendation (DC Rec) other (see comments)  (defer to ortho)   OT Rationale for DC Rec Pt impacted by pain and nausea this date. Ambulating with CGA/walker. Required some assist with AE for LB dressing. SBA/mod I for toileting and g/h. Anticipate pt med mgmt and recovery, pt will be safe to return home with assit from family for household IADLs, mobility, dressing, and showering.   OT Brief overview of current status See above   OT Equipment Needed at Discharge reacher;other (see  comments);raised toilet seat  (sock aide)   Total Session Time   Timed Code Treatment Minutes 40   Total Session Time (sum of timed and untimed services) 46

## 2024-05-02 NOTE — PLAN OF CARE
Goal Outcome Evaluation:    Diagnosis: R total hip Arthroplasty  POD#:  1  Mental Status: A/O x4  Activity/dangle: 1 Gb/walker  Diet:regular  Pain: scheduled Tylenol  Rodriguez/Voiding:  B&B  02/LDA: RA, PIV SL  D/C Date: TBD  Other Info: Dressing CDI, CMS intact.Tele- SR.

## 2024-05-02 NOTE — PLAN OF CARE
Trauma/Ortho/Medical (Choose one):  Ortho    Diagnosis: R MINDI  POD#: DOS  Mental Status:  A&O x4  Activity/dangle:  A1, walker and gait belt  Diet:  regular, pt prefers to stay on liquids  Pain:  Oxycodone, Atrax and Tylenol  Rodriguez/Voiding: bathroom  Tele/Restraints/Iso: Tele: NSR  02/LDA:  1L O2 via nasal cannula, IV fluid infusing  D/C Date: TBD  Other Info: patient seems anxious, needs reassurance

## 2024-05-02 NOTE — PLAN OF CARE
Trauma/Ortho/Medical (Choose one):  Ortho     Diagnosis: R MINDI  POD#:  #1  Mental Status:  A&O x4  Activity/dangle:  A1, walker and gait belt, ambulated in the hallway  Diet:  regular, fair appetite d/t nausea (vomited x2 during PT)  Pain:  Oxycodone and Tylenol  Rodriguez/Voiding: bathroom  Tele/Restraints/Iso: Tele: NSR  02/LDA:  1L O2 via nasal cannula, IV fluid infusing  D/C Date: TBD  Other Info:     BP in the 80s, improving after fluid bolus. Maintenance IV infusing. O2 saturation fluctuates between mid-80s and low 90's. Placed on 1L O2 via nasal canula. Started on IV antibiotic. Throat discomfort is getting better. Now able to tolerate incentive spirometer. Fair appetite, nausea resolving w/ antiemetic. Encouraged fluid/po intake.   Updated/left voicemail message for Dr. Rodriguez.

## 2024-05-02 NOTE — PLAN OF CARE
Trauma/Ortho/Medical (Choose one):  Ortho     Diagnosis: R MINDI  POD#: DOS  Mental Status:  A&O x4  Activity/dangle:  A1, walker and gait belt  Diet:  regular, pt prefers to stay on liquids  Pain:  Oxycodone, Atrax and Tylenol  Rodriguez/Voiding: bathroom  Tele/Restraints/Iso: Tele: NSR  02/LDA:  NELSON   D/C Date: TBD  Other Info:

## 2024-05-03 ENCOUNTER — APPOINTMENT (OUTPATIENT)
Dept: PHYSICAL THERAPY | Facility: CLINIC | Age: 74
DRG: 469 | End: 2024-05-03
Attending: ORTHOPAEDIC SURGERY
Payer: MEDICARE

## 2024-05-03 ENCOUNTER — APPOINTMENT (OUTPATIENT)
Dept: OCCUPATIONAL THERAPY | Facility: CLINIC | Age: 74
DRG: 469 | End: 2024-05-03
Attending: ORTHOPAEDIC SURGERY
Payer: MEDICARE

## 2024-05-03 ENCOUNTER — APPOINTMENT (OUTPATIENT)
Dept: GENERAL RADIOLOGY | Facility: CLINIC | Age: 74
DRG: 469 | End: 2024-05-03
Attending: STUDENT IN AN ORGANIZED HEALTH CARE EDUCATION/TRAINING PROGRAM
Payer: MEDICARE

## 2024-05-03 LAB
ERYTHROCYTE [DISTWIDTH] IN BLOOD BY AUTOMATED COUNT: 12.9 % (ref 10–15)
GLUCOSE SERPL-MCNC: 100 MG/DL (ref 70–99)
HCT VFR BLD AUTO: 25.5 % (ref 35–47)
HGB BLD-MCNC: 8.3 G/DL (ref 11.7–15.7)
HGB BLD-MCNC: 8.3 G/DL (ref 11.7–15.7)
MCH RBC QN AUTO: 28.9 PG (ref 26.5–33)
MCHC RBC AUTO-ENTMCNC: 32.5 G/DL (ref 31.5–36.5)
MCV RBC AUTO: 89 FL (ref 78–100)
PLATELET # BLD AUTO: 184 10E3/UL (ref 150–450)
RBC # BLD AUTO: 2.87 10E6/UL (ref 3.8–5.2)
WBC # BLD AUTO: 8.5 10E3/UL (ref 4–11)

## 2024-05-03 PROCEDURE — 85018 HEMOGLOBIN: CPT | Performed by: ORTHOPAEDIC SURGERY

## 2024-05-03 PROCEDURE — 97116 GAIT TRAINING THERAPY: CPT | Mod: GP | Performed by: PHYSICAL THERAPY ASSISTANT

## 2024-05-03 PROCEDURE — 250N000011 HC RX IP 250 OP 636: Performed by: STUDENT IN AN ORGANIZED HEALTH CARE EDUCATION/TRAINING PROGRAM

## 2024-05-03 PROCEDURE — 250N000011 HC RX IP 250 OP 636: Performed by: ORTHOPAEDIC SURGERY

## 2024-05-03 PROCEDURE — 36415 COLL VENOUS BLD VENIPUNCTURE: CPT | Performed by: ORTHOPAEDIC SURGERY

## 2024-05-03 PROCEDURE — 120N000001 HC R&B MED SURG/OB

## 2024-05-03 PROCEDURE — 97530 THERAPEUTIC ACTIVITIES: CPT | Mod: GP | Performed by: PHYSICAL THERAPY ASSISTANT

## 2024-05-03 PROCEDURE — 97110 THERAPEUTIC EXERCISES: CPT | Mod: GP | Performed by: PHYSICAL THERAPY ASSISTANT

## 2024-05-03 PROCEDURE — 250N000013 HC RX MED GY IP 250 OP 250 PS 637: Performed by: ORTHOPAEDIC SURGERY

## 2024-05-03 PROCEDURE — 97530 THERAPEUTIC ACTIVITIES: CPT | Mod: GO

## 2024-05-03 PROCEDURE — 99232 SBSQ HOSP IP/OBS MODERATE 35: CPT | Performed by: STUDENT IN AN ORGANIZED HEALTH CARE EDUCATION/TRAINING PROGRAM

## 2024-05-03 PROCEDURE — 85041 AUTOMATED RBC COUNT: CPT | Performed by: STUDENT IN AN ORGANIZED HEALTH CARE EDUCATION/TRAINING PROGRAM

## 2024-05-03 PROCEDURE — 82947 ASSAY GLUCOSE BLOOD QUANT: CPT | Performed by: STUDENT IN AN ORGANIZED HEALTH CARE EDUCATION/TRAINING PROGRAM

## 2024-05-03 PROCEDURE — 258N000003 HC RX IP 258 OP 636: Performed by: STUDENT IN AN ORGANIZED HEALTH CARE EDUCATION/TRAINING PROGRAM

## 2024-05-03 PROCEDURE — 71046 X-RAY EXAM CHEST 2 VIEWS: CPT

## 2024-05-03 RX ADMIN — ACETAMINOPHEN 975 MG: 325 TABLET, FILM COATED ORAL at 20:06

## 2024-05-03 RX ADMIN — ACETAMINOPHEN 975 MG: 325 TABLET, FILM COATED ORAL at 04:17

## 2024-05-03 RX ADMIN — PIPERACILLIN AND TAZOBACTAM 3.38 G: 3; .375 INJECTION, POWDER, FOR SOLUTION INTRAVENOUS at 06:15

## 2024-05-03 RX ADMIN — EZETIMIBE 10 MG: 10 TABLET ORAL at 08:09

## 2024-05-03 RX ADMIN — BENZOCAINE 6 MG-MENTHOL 10 MG LOZENGES 1 LOZENGE: at 20:06

## 2024-05-03 RX ADMIN — OXYBUTYNIN CHLORIDE 5 MG: 5 TABLET ORAL at 08:09

## 2024-05-03 RX ADMIN — SIMVASTATIN 80 MG: 40 TABLET, FILM COATED ORAL at 21:41

## 2024-05-03 RX ADMIN — PIPERACILLIN AND TAZOBACTAM 3.38 G: 3; .375 INJECTION, POWDER, FOR SOLUTION INTRAVENOUS at 17:51

## 2024-05-03 RX ADMIN — SENNOSIDES AND DOCUSATE SODIUM 1 TABLET: 50; 8.6 TABLET ORAL at 20:07

## 2024-05-03 RX ADMIN — NORTRIPTYLINE HYDROCHLORIDE 50 MG: 50 CAPSULE ORAL at 21:41

## 2024-05-03 RX ADMIN — ASPIRIN 81 MG: 81 TABLET, COATED ORAL at 20:06

## 2024-05-03 RX ADMIN — ACETAMINOPHEN 975 MG: 325 TABLET, FILM COATED ORAL at 11:46

## 2024-05-03 RX ADMIN — OXYBUTYNIN CHLORIDE 5 MG: 5 TABLET ORAL at 17:51

## 2024-05-03 RX ADMIN — PANTOPRAZOLE SODIUM 40 MG: 40 TABLET, DELAYED RELEASE ORAL at 08:09

## 2024-05-03 RX ADMIN — PIPERACILLIN AND TAZOBACTAM 3.38 G: 3; .375 INJECTION, POWDER, FOR SOLUTION INTRAVENOUS at 11:46

## 2024-05-03 RX ADMIN — ASPIRIN 81 MG: 81 TABLET, COATED ORAL at 08:09

## 2024-05-03 RX ADMIN — SODIUM CHLORIDE, POTASSIUM CHLORIDE, SODIUM LACTATE AND CALCIUM CHLORIDE: 600; 310; 30; 20 INJECTION, SOLUTION INTRAVENOUS at 08:13

## 2024-05-03 RX ADMIN — ONDANSETRON 4 MG: 2 INJECTION INTRAMUSCULAR; INTRAVENOUS at 14:49

## 2024-05-03 RX ADMIN — OXYBUTYNIN CHLORIDE 5 MG: 5 TABLET ORAL at 21:41

## 2024-05-03 RX ADMIN — PIPERACILLIN AND TAZOBACTAM 3.38 G: 3; .375 INJECTION, POWDER, FOR SOLUTION INTRAVENOUS at 00:12

## 2024-05-03 ASSESSMENT — ACTIVITIES OF DAILY LIVING (ADL)
ADLS_ACUITY_SCORE: 45
ADLS_ACUITY_SCORE: 45
ADLS_ACUITY_SCORE: 41
ADLS_ACUITY_SCORE: 45
ADLS_ACUITY_SCORE: 41
ADLS_ACUITY_SCORE: 45
ADLS_ACUITY_SCORE: 42
ADLS_ACUITY_SCORE: 45
ADLS_ACUITY_SCORE: 42
ADLS_ACUITY_SCORE: 44
ADLS_ACUITY_SCORE: 41
ADLS_ACUITY_SCORE: 45
ADLS_ACUITY_SCORE: 44
ADLS_ACUITY_SCORE: 41
ADLS_ACUITY_SCORE: 42
ADLS_ACUITY_SCORE: 44
ADLS_ACUITY_SCORE: 44
ADLS_ACUITY_SCORE: 45
ADLS_ACUITY_SCORE: 42
ADLS_ACUITY_SCORE: 44
ADLS_ACUITY_SCORE: 45

## 2024-05-03 NOTE — PROGRESS NOTES
[facility]    Medicine Progress Note - Hospitalist Service    Date of Admission:  5/1/2024    Assessment & Plan   Ania Castillo is a 74 year old female with a PMH significant for CAD, HTN, HLD, DM2, depression, anxiety, and GERD admitted on 5/1/2024 for an elective right total hip arthoplasty. Hospitalist was consulted for co-medical management in s/p MINDI.     Osteoarthritis  S/p right total hip arthoplasty  Had R MINDI with Dr. Rodriguez 5/1 performed under general anesthesia. No intraoperative complications.  - Defer post-operative cares including analgesia, anticoagulation, perioperative antibiotics and PT/OT to orthopedic surgery     Aspiration pneumonia  Patient had aspiration event intraoperatively. CXR immediately following demonstrated patchy left lung opacity consistent with aspiration pneumonitis.    - late in the day 05/02 developed hypoxia with low BP. Given fluid bolus, started on antibiotics and oxygen  - clinically improving today, now off oxygen  - stop IVF and follow clinically. If she remains stable likely can discharge home late in the day with course of oral antibiotics if medically ready for ortho standpoint.    Updated by nursing that patient vomited again later in the morning. Suspect she is still recovering from anesthesia. Can try to premedicate with zofran prior to her next meal. Continue PTA PPI and continue monitoring today in the hospital give ongoing medical issues.    Coronary Artery Disease  Hyperlipidemia  Hypertension  PTA regimen includes losartan 25 mg daily, metoprolol 25 mg BID, Simvastatin 80 mg daily, and ezetimibe 10 mg daily.  - Continue PTA simvastatin and Zetia  - Continue PTA metoprolol with hold parameters  - hold losartan and metoprolol for low BP     DM type 2  Last HgbA1c 6.1. Glucose has been well controlled today  - Glucose checks BID and HS     Depression  Anxiety  - Continue PTA nortriptyline     GERD  - Continue PTA omeprazole    Iron  deficiency anemia  - stable, OP work up            Clinically Significant Risk Factors                  # Hypertension: Noted on problem list                   Disposition Plan     Medically Ready for Discharge: Anticipated Tomorrow             Tracy Bravo DO  Hospitalist Service  [facility]  Securely message with [employer] (more info)  Text page via [employer] Paging/Directory   ______________________________________________________________________    Interval History   Patient off oxygen and she reports feeling much better. Less nausea and less pain. She was headed down to XR.     Physical Exam   Vital Signs: Temp: 98.3  F (36.8  C) Temp src: Oral BP: 117/65 Pulse: 74   Resp: 16 SpO2: 96 % O2 Device: None (Room air) Oxygen Delivery: 1 LPM  Weight: 125 lbs 0 oz    Constitutional: Awake, alert, cooperative, no apparent distress  Respiratory: no wheezing  Skin/Integumen: No rashes, no cyanosis, no edema  Other:      Medical Decision Making       35 MINUTES SPENT BY ME on the date of service doing chart review, history, exam, documentation & further activities per the note.      Data     I have personally reviewed the following data over the past 24 hrs:    8.5  \   8.3 (L); 8.3 (L)   / 184     N/A N/A N/A /  100 (H)   N/A N/A N/A \       Imaging results reviewed over the past 24 hrs:   Recent Results (from the past 24 hour(s))   XR Chest 2 Views    Narrative    CHEST TWO VIEWS  5/3/2024 9:04 AM       INDICATION: Aspiration event, now increasing oxygen.  COMPARISON: 5/1/2024       Impression    IMPRESSION: Stable small hiatal hernia. Infiltrate in the left midlung  similar to previous. Trace pleural effusions.       ISSA DONG MD         SYSTEM ID:  [id number]

## 2024-05-03 NOTE — PROGRESS NOTES
Ania Castillo  2024  POD # 1    Pain well-controlled.  Tolerating physical therapy and rehabilitation well.?   Don see  Any notes   aspiration   I wasn't aware of this event  Till now   Temperatures:  Current - Temp: 98.6  F (37  C); Max - Temp  Av.2  F (36.8  C)  Min: 97.3  F (36.3  C)  Max: 98.7  F (37.1  C)  Pulse range: Pulse  Av.5  Min: 67  Max: 86  Blood pressure range: Systolic (24hrs), Av , Min:84 , Max:117   ; Diastolic (24hrs), Av, Min:40, Max:80    CMS: intact  Labs:   Results for orders placed or performed during the hospital encounter of 24 (from the past 24 hour(s))   Glucose by meter   Result Value Ref Range    GLUCOSE BY METER POCT 89 70 - 99 mg/dL   CBC with Platelets & Differential    Narrative    The following orders were created for panel order CBC with Platelets & Differential.  Procedure                               Abnormality         Status                     ---------                               -----------         ------                     CBC with platelets and d...[[id number]]  Abnormal            Final result                 Please view results for these tests on the individual orders.   Basic metabolic panel   Result Value Ref Range    Sodium 137 135 - 145 mmol/L    Potassium 4.0 3.4 - 5.3 mmol/L    Chloride 103 98 - 107 mmol/L    Carbon Dioxide (CO2) 23 22 - 29 mmol/L    Anion Gap 11 7 - 15 mmol/L    Urea Nitrogen 14.7 8.0 - 23.0 mg/dL    Creatinine 0.77 0.51 - 0.95 mg/dL    GFR Estimate 80 >60 mL/min/1.73m2    Calcium 8.4 (L) 8.8 - 10.2 mg/dL    Glucose 152 (H) 70 - 99 mg/dL   CBC with platelets and differential   Result Value Ref Range    WBC Count 9.9 4.0 - 11.0 10e3/uL    RBC Count 3.14 (L) 3.80 - 5.20 10e6/uL    Hemoglobin 9.1 (L) 11.7 - 15.7 g/dL    Hematocrit 28.2 (L) 35.0 - 47.0 %    MCV 90 78 - 100 fL    MCH 29.0 26.5 - 33.0 pg    MCHC 32.3 31.5 - 36.5 g/dL    RDW 13.0 10.0 - 15.0 %    Platelet Count 201 150 - 450 10e3/uL    % Neutrophils 84 %     % Lymphocytes 11 %    % Monocytes 4 %    % Eosinophils 1 %    % Basophils 0 %    % Immature Granulocytes 0 %    NRBCs per 100 WBC 0 <1 /100    Absolute Neutrophils 8.3 1.6 - 8.3 10e3/uL    Absolute Lymphocytes 1.1 0.8 - 5.3 10e3/uL    Absolute Monocytes 0.4 0.0 - 1.3 10e3/uL    Absolute Eosinophils 0.1 0.0 - 0.7 10e3/uL    Absolute Basophils 0.0 0.0 - 0.2 10e3/uL    Absolute Immature Granulocytes 0.0 <=0.4 10e3/uL    Absolute NRBCs 0.0 10e3/uL   Glucose   Result Value Ref Range    Glucose 152 (H) 70 - 99 mg/dL   Glucose by meter   Result Value Ref Range    GLUCOSE BY METER POCT 106 (H) 70 - 99 mg/dL       PLAN:  Discharge plan: per medical when  Pulmonary risks are  Gone.    Will  discharge  Oxycodone  And  Try  norco  to minimize  Nausea  Hope she gets  Adequate pain relief

## 2024-05-03 NOTE — PROGRESS NOTES
Ortho    Medical  Desires t hold till tomorrow  Due to  Cough    Surprised  She had  ths  Aspiration eent   She had  A spinal anesthesia        Will help set up  Some home care

## 2024-05-03 NOTE — PLAN OF CARE
Goal Outcome Evaluation:                      Date/Time:5/2 [id number]    Trauma/Ortho/Medical (Choose one) :Ortho    Diagnosis:R MINDI  POD#:2  Mental Status: A&O x4  Activity/dangle:A-1 walker GB  Diet:Reg  Pain:Oxycodone and Tylenol  Rodriguez/Voiding:BR  Tele/Restraints/Iso:Tele;NSR  02/LDA:RA PIV infusing   D/C Date:TBD  Other Info:N/V resolved

## 2024-05-04 ENCOUNTER — APPOINTMENT (OUTPATIENT)
Dept: PHYSICAL THERAPY | Facility: CLINIC | Age: 74
DRG: 469 | End: 2024-05-04
Attending: ORTHOPAEDIC SURGERY
Payer: MEDICARE

## 2024-05-04 ENCOUNTER — APPOINTMENT (OUTPATIENT)
Dept: SPEECH THERAPY | Facility: CLINIC | Age: 74
DRG: 469 | End: 2024-05-04
Attending: HOSPITALIST
Payer: MEDICARE

## 2024-05-04 ENCOUNTER — APPOINTMENT (OUTPATIENT)
Dept: OCCUPATIONAL THERAPY | Facility: CLINIC | Age: 74
DRG: 469 | End: 2024-05-04
Attending: ORTHOPAEDIC SURGERY
Payer: MEDICARE

## 2024-05-04 LAB
ERYTHROCYTE [DISTWIDTH] IN BLOOD BY AUTOMATED COUNT: 13 % (ref 10–15)
GLUCOSE BLDC GLUCOMTR-MCNC: 111 MG/DL (ref 70–99)
GLUCOSE BLDC GLUCOMTR-MCNC: 178 MG/DL (ref 70–99)
HCT VFR BLD AUTO: 26.8 % (ref 35–47)
HGB BLD-MCNC: 8.5 G/DL (ref 11.7–15.7)
MCH RBC QN AUTO: 28.6 PG (ref 26.5–33)
MCHC RBC AUTO-ENTMCNC: 31.7 G/DL (ref 31.5–36.5)
MCV RBC AUTO: 90 FL (ref 78–100)
PLATELET # BLD AUTO: 222 10E3/UL (ref 150–450)
RBC # BLD AUTO: 2.97 10E6/UL (ref 3.8–5.2)
WBC # BLD AUTO: 7.6 10E3/UL (ref 4–11)

## 2024-05-04 PROCEDURE — 250N000013 HC RX MED GY IP 250 OP 250 PS 637: Performed by: ORTHOPAEDIC SURGERY

## 2024-05-04 PROCEDURE — 97110 THERAPEUTIC EXERCISES: CPT | Mod: GP

## 2024-05-04 PROCEDURE — 97116 GAIT TRAINING THERAPY: CPT | Mod: GP

## 2024-05-04 PROCEDURE — 36415 COLL VENOUS BLD VENIPUNCTURE: CPT | Performed by: STUDENT IN AN ORGANIZED HEALTH CARE EDUCATION/TRAINING PROGRAM

## 2024-05-04 PROCEDURE — 250N000013 HC RX MED GY IP 250 OP 250 PS 637: Performed by: HOSPITALIST

## 2024-05-04 PROCEDURE — 85027 COMPLETE CBC AUTOMATED: CPT | Performed by: STUDENT IN AN ORGANIZED HEALTH CARE EDUCATION/TRAINING PROGRAM

## 2024-05-04 PROCEDURE — 97535 SELF CARE MNGMENT TRAINING: CPT | Mod: GO

## 2024-05-04 PROCEDURE — 99232 SBSQ HOSP IP/OBS MODERATE 35: CPT | Performed by: HOSPITALIST

## 2024-05-04 PROCEDURE — 120N000001 HC R&B MED SURG/OB

## 2024-05-04 PROCEDURE — 97530 THERAPEUTIC ACTIVITIES: CPT | Mod: GP

## 2024-05-04 PROCEDURE — 92610 EVALUATE SWALLOWING FUNCTION: CPT | Mod: GN

## 2024-05-04 PROCEDURE — 250N000011 HC RX IP 250 OP 636: Performed by: STUDENT IN AN ORGANIZED HEALTH CARE EDUCATION/TRAINING PROGRAM

## 2024-05-04 RX ORDER — SALIVA STIMULANT COMB. NO.3
2 SPRAY, NON-AEROSOL (ML) MUCOUS MEMBRANE 4 TIMES DAILY
Status: DISCONTINUED | OUTPATIENT
Start: 2024-05-04 | End: 2024-05-05 | Stop reason: HOSPADM

## 2024-05-04 RX ORDER — GUAIFENESIN 600 MG/1
600 TABLET, EXTENDED RELEASE ORAL 2 TIMES DAILY
Status: DISCONTINUED | OUTPATIENT
Start: 2024-05-04 | End: 2024-05-05 | Stop reason: HOSPADM

## 2024-05-04 RX ORDER — BENZONATATE 100 MG/1
100 CAPSULE ORAL 3 TIMES DAILY PRN
Status: DISCONTINUED | OUTPATIENT
Start: 2024-05-04 | End: 2024-05-05 | Stop reason: HOSPADM

## 2024-05-04 RX ADMIN — Medication 2 SPRAY: at 20:13

## 2024-05-04 RX ADMIN — PANTOPRAZOLE SODIUM 40 MG: 40 TABLET, DELAYED RELEASE ORAL at 09:16

## 2024-05-04 RX ADMIN — BENZONATATE 100 MG: 100 CAPSULE ORAL at 20:13

## 2024-05-04 RX ADMIN — BENZOCAINE 6 MG-MENTHOL 10 MG LOZENGES 1 LOZENGE: at 00:35

## 2024-05-04 RX ADMIN — OXYBUTYNIN CHLORIDE 5 MG: 5 TABLET ORAL at 09:16

## 2024-05-04 RX ADMIN — ASPIRIN 81 MG: 81 TABLET, COATED ORAL at 09:16

## 2024-05-04 RX ADMIN — BENZOCAINE 6 MG-MENTHOL 10 MG LOZENGES 1 LOZENGE: at 21:16

## 2024-05-04 RX ADMIN — EZETIMIBE 10 MG: 10 TABLET ORAL at 09:16

## 2024-05-04 RX ADMIN — ACETAMINOPHEN 975 MG: 325 TABLET, FILM COATED ORAL at 04:53

## 2024-05-04 RX ADMIN — ACETAMINOPHEN 650 MG: 325 TABLET, FILM COATED ORAL at 16:07

## 2024-05-04 RX ADMIN — ASPIRIN 81 MG: 81 TABLET, COATED ORAL at 21:15

## 2024-05-04 RX ADMIN — HYDROCODONE BITARTRATE AND ACETAMINOPHEN 1 TABLET: 5; 325 TABLET ORAL at 08:20

## 2024-05-04 RX ADMIN — PIPERACILLIN AND TAZOBACTAM 3.38 G: 3; .375 INJECTION, POWDER, FOR SOLUTION INTRAVENOUS at 00:28

## 2024-05-04 RX ADMIN — NORTRIPTYLINE HYDROCHLORIDE 50 MG: 50 CAPSULE ORAL at 21:16

## 2024-05-04 RX ADMIN — PIPERACILLIN AND TAZOBACTAM 3.38 G: 3; .375 INJECTION, POWDER, FOR SOLUTION INTRAVENOUS at 18:16

## 2024-05-04 RX ADMIN — OXYBUTYNIN CHLORIDE 5 MG: 5 TABLET ORAL at 21:16

## 2024-05-04 RX ADMIN — BENZOCAINE 6 MG-MENTHOL 10 MG LOZENGES 1 LOZENGE: at 08:32

## 2024-05-04 RX ADMIN — GUAIFENESIN 600 MG: 600 TABLET, EXTENDED RELEASE ORAL at 21:15

## 2024-05-04 RX ADMIN — BENZOCAINE 6 MG-MENTHOL 10 MG LOZENGES 1 LOZENGE: at 20:15

## 2024-05-04 RX ADMIN — PIPERACILLIN AND TAZOBACTAM 3.38 G: 3; .375 INJECTION, POWDER, FOR SOLUTION INTRAVENOUS at 06:07

## 2024-05-04 RX ADMIN — OXYBUTYNIN CHLORIDE 5 MG: 5 TABLET ORAL at 16:07

## 2024-05-04 RX ADMIN — BENZOCAINE 6 MG-MENTHOL 10 MG LOZENGES 1 LOZENGE: at 16:07

## 2024-05-04 RX ADMIN — PIPERACILLIN AND TAZOBACTAM 3.38 G: 3; .375 INJECTION, POWDER, FOR SOLUTION INTRAVENOUS at 13:00

## 2024-05-04 RX ADMIN — SIMVASTATIN 80 MG: 40 TABLET, FILM COATED ORAL at 21:16

## 2024-05-04 RX ADMIN — HYDROXYZINE HYDROCHLORIDE 10 MG: 10 TABLET ORAL at 08:20

## 2024-05-04 RX ADMIN — GUAIFENESIN 600 MG: 600 TABLET, EXTENDED RELEASE ORAL at 13:00

## 2024-05-04 ASSESSMENT — ACTIVITIES OF DAILY LIVING (ADL)
ADLS_ACUITY_SCORE: 40
ADLS_ACUITY_SCORE: 41
ADLS_ACUITY_SCORE: 40
ADLS_ACUITY_SCORE: 41
ADLS_ACUITY_SCORE: 40
ADLS_ACUITY_SCORE: 41
ADLS_ACUITY_SCORE: 40
ADLS_ACUITY_SCORE: 40
ADLS_ACUITY_SCORE: 41
ADLS_ACUITY_SCORE: 40
ADLS_ACUITY_SCORE: 40
ADLS_ACUITY_SCORE: 41
ADLS_ACUITY_SCORE: 41
ADLS_ACUITY_SCORE: 40
ADLS_ACUITY_SCORE: 41
ADLS_ACUITY_SCORE: 40
ADLS_ACUITY_SCORE: 40

## 2024-05-04 NOTE — PROGRESS NOTES
[facility]    Medicine Progress Note - Hospitalist Service    Date of Admission:  5/1/2024    Assessment & Plan   Ania Castillo is a 74 year old female with a PMH significant for CAD, HTN, HLD, DM2, depression, anxiety, and GERD admitted on 5/1/2024 for an elective right total hip arthoplasty. Hospitalist was consulted for co-medical management in s/p MINDI.     Osteoarthritis  S/p right total hip arthoplasty  Had R MINDI with Dr. Rodriguez 5/1. No intraoperative complications.  - Defer post-operative cares including analgesia, anticoagulation, perioperative antibiotics and PT/OT to orthopedic surgery     Aspiration pneumonia, improving  Patient had aspiration event intraoperatively. CXR immediately following demonstrated patchy left lung opacity consistent with aspiration pneumonitis.  -Continue IV antibiotics.  - clinically improving today, now off oxygen  -Seen the patient did not have general anesthesia, this likely would not explain her aspiration events, hence will obtain an SLP for swallow evaluation.  Patient does not endorse coughing after meals.    Coronary Artery Disease  Hyperlipidemia  Hypertension  PTA regimen includes losartan 25 mg daily, metoprolol 25 mg BID, Simvastatin 80 mg daily, and ezetimibe 10 mg daily.  - Continue PTA simvastatin and Zetia  - Continue PTA metoprolol with hold parameters  - hold losartan and metoprolol for low BP     DM type 2  Last HgbA1c 6.1. Glucose has been well controlled today  - Glucose checks BID and HS     Depression  Anxiety  - Continue PTA nortriptyline     GERD  - Continue PTA omeprazole    Iron deficiency anemia  - stable, OP work up            Clinically Significant Risk Factors                  # Hypertension: Noted on problem list                   Disposition Plan     Medically Ready for Discharge: Anticipated Tomorrow             Danni Yusuf MD  Hospitalist Service  [facility]  Securely message with [employer]  info)  Text page via [facility] Paging/Directory   ______________________________________________________________________    Interval History   Patient continues to endorse nausea today.  She also had multiple complaints about not getting her pain meds on time and hence is having intractable pain.  She is requesting to speak with orthopedic surgery service  Patient also complains about coughing after eating.  Will consult SLP for swallow evaluation.  Continues to maintain off supplemental O2 which is reassuring.  She has questions about her voice which she notes has been abnormal ever since surgery.  Patient is not medically clear at this time, recommend to observe 1 more day.    Physical Exam   Vital Signs: Temp: 98  F (36.7  C) Temp src: Oral BP: 110/46 Pulse: 85   Resp: 16 SpO2: 92 % O2 Device: None (Room air)    Weight: 125 lbs 0 oz    Constitutional: Awake, alert, cooperative, no apparent distress  Respiratory: no wheezing  Skin/Integumen: No rashes, no cyanosis, no edema  Other:      Medical Decision Making       35 MINUTES SPENT BY ME on the date of service doing chart review, history, exam, documentation & further activities per the note.      Data     I have personally reviewed the following data over the past 24 hrs:    7.6  \   8.5 (L)   / 222     N/A N/A N/A /  111 (H)   N/A N/A N/A \       Imaging results reviewed over the past 24 hrs:   No results found for this or any previous visit (from the past 24 hour(s)).

## 2024-05-04 NOTE — PROGRESS NOTES
"Clinical Swallow Evaluation (CSE):     05/04/24 1224   Appointment Info   Signing Clinician's Name / Credentials (SLP) Alysia Russ MS CCC-SLP   General Information   Onset of Illness/Injury or Date of Surgery 05/01/24   Referring Physician Danni Taylor MD   Patient/Family Therapy Goal Statement (SLP) To stop vomitting   Pertinent History of Current Problem   Per provider \"Ania Castillo is a 74 year old female with a PMH significant for CAD, HTN, HLD, DM2, depression, anxiety, and GERD admitted on 5/1/2024 for an elective right total hip arthoplasty. Hospitalist was consulted for co-medical management in s/p MINDI.\"     SLP given recurrent aspiration. Pt had vomitting after surgery. Per EMR and discussion with RN: no difficulty swallowing observed though ?occassional vomitting after PO - RN was in room with pt for lunch yesterday and pt reported sandwhich was too try but RN said pt tolerated everything else on tray without difficulty, still the pt told the MD she vomitted when that was not observed by RN.      General Observations Pt alert, pleasant, upright in chair for evaluation   Type of Evaluation   Type of Evaluation Swallow Evaluation   Oral Motor   Oral Musculature generally intact   Structural Abnormalities none present   Mucosal Quality dry   Dentition (Oral Motor)   Dentition (Oral Motor) adequate dentition   Facial Symmetry (Oral Motor)   Facial Symmetry (Oral Motor) WNL   Lip Function (Oral Motor)   Lip Range of Motion (Oral Motor) WNL   Tongue Function (Oral Motor)   Tongue ROM (Oral Motor) WNL   Jaw Function (Oral Motor)   Jaw Function (Oral Motor) WNL   Cough/Swallow/Gag Reflex (Oral Motor)   Soft Palate/Velum (Oral Motor) WNL   Volitional Throat Clear/Cough (Oral Motor) WNL   Volitional Swallow (Oral Motor) WNL   Vocal Quality/Secretion Management (Oral Motor)   Vocal Quality (Oral Motor) hoarse  (minimally)   Secretion Management (Oral Motor) WNL   General Swallowing Observations   Past History " of Dysphagia none per EMR or pt report   Respiratory Support room air   Current Diet/Method of Nutritional Intake (General Swallowing Observations, NIS) regular diet;thin liquids (level 0)   Swallowing Evaluation Clinical swallow evaluation   Clinical Swallow Evaluation   Feeding Assistance no assistance needed   Clinical Swallow Evaluation Textures Trialed thin liquids;pureed;solid foods   Clinical Swallow Eval: Thin Liquid Texture Trial   Mode of Presentation, Thin Liquids cup;straw;self-fed   Volume of Liquid or Food Presented ~ 2 oz   Oral Phase of Swallow WFL   Pharyngeal Phase of Swallow intact   Diagnostic Statement no overt signs/sx aspiration noted   Clinical Swallow Evaluation: Puree Solid Texture Trial   Mode of Presentation, Puree spoon;self-fed   Volume of Puree Presented 2 bites   Oral Phase, Puree WFL   Pharyngeal Phase, Puree intact   Diagnostic Statement no overt signs/sx aspiration noted   Clinical Swallow Evaluation: Solid Food Texture Trial   Mode of Presentation self-fed   Volume Presented 1 bite   Oral Phase WFL   Pharyngeal Phase intact   Diagnostic Statement no overt signs/sx aspiration noted   Esophageal Phase of Swallow   Patient reports or presents with symptoms of esophageal dysphagia Yes   Esophageal comments significantly delayed dry cough x1, frequent belching with limited PO, reports of vomitting after eating/drinking this admission, hiatal hernia per recent CxR   Swallowing Recommendations   Diet Consistency Recommendations regular diet;thin liquids (level 0)   Supervision Level for Intake patient independent   Mode of Delivery Recommendations bolus size, small;slow rate of intake;food moistened   Swallowing Maneuver Recommendations alternate food and liquid intake   Recommended Feeding/Eating Techniques (Swallow Eval) maintain upright sitting position for eating;maintain upright posture during/after eating for 30 minutes;minimize distractions during oral intake;moisten oral mucosa  prior to intake   Medication Administration Recommendations, Swallowing (SLP) whole as tolerate, per pt preference   General Therapy Interventions   Planned Therapy Interventions Dysphagia Treatment   Clinical Impression   Criteria for Skilled Therapeutic Interventions Met (SLP Eval) Yes, treatment indicated   SLP Diagnosis suspected WFL oropharyngeal swallow, suspect esophageal dysfunction   Risks & Benefits of therapy have been explained evaluation/treatment results reviewed;care plan/treatment goals reviewed;risks/benefits reviewed;current/potential barriers reviewed;participants voiced agreement with care plan;participants included;patient   Clinical Impression Comments   Clinical swallow evaluation completed with thin liquids, small amounts of puree/regular solids. Pt currently presents with suspected functional oropharyngeal swallow, suspect esophageal dysfunction. Oromotor exam WFL  besides xerostomia, minimal hoarse vocal quality. Pt IND with self-feeding and demonstrated WFL labial seal, WFL oral containment, WFL mastication/oral clearance, no percievable delay in swallow initiation, notable laryngeal elevation to palpation, single swallows per bolus and no overt clinical signs/sx aspiration noted. Pt denies difficulty swallowing when asked. She does either present with and/or report the following symptoms, which are c/w esophageal dysfunction: significantly delayed dry cough x1, frequent belching with limited PO, reports of vomitting after eating/drinking this admission, hiatal hernia per recent CxR. From an SLP perspective OK for regular/thin with strategies, educated on pharyngoesophageal clearance strategies and anti-refux precautions. Also educated on xerostomia reduction strategies and potential biotene pending MD order.     SLP Total Evaluation Time   Eval: oral/pharyngeal swallow function, clinical swallow Minutes (87872) 15   SLP Goals   Therapy Frequency (SLP Eval) 1 time/week   SLP Predicted  Duration/Target Date for Goal Attainment 05/11/24   SLP Goals Swallow   SLP: Safely tolerate diet without signs/symptoms of aspiration Regular diet;Thin liquids;With use of swallow precautions;Independently   SLP Discharge Planning   SLP Plan strategy reinforcement/education as needed   SLP Discharge Recommendation home   SLP Rationale for DC Rec no further SLP needs post-acute, consider OP GI work up/consult   SLP Brief overview of current status  Regular, thin with self-selecting softer/moister options, small bites, alternate between solids/liquids, upright for all PO/30-60 minutes after; frequent sips of water throughout the day and before food/pills with ?potential biotene for xerostomia. Consider GI consult /esophageal work up - can be completed as OP   Total Session Time   Total Session Time (sum of timed and untimed services) 15

## 2024-05-04 NOTE — PROGRESS NOTES
Diagnosis:R MINDI  POD#:3  Mental Status: A&O x4  Activity/dangle:A-1 walker GB  Diet:Reg  Pain:Oxycodone and Tylenol  Rodriguez/Voiding:BR  Tele/Restraints/Iso:Tele;NSR  02/LDA:RA PIV infusing   D/C Date:TBD

## 2024-05-04 NOTE — PROGRESS NOTES
Ortho    Is  She medically ok to  discharge   I  dont  See  In staff notes  Any further nausea    Stop opioids    Po tyleno    If  Augustin be  Use  Iv  Tylenol    I had  Requested  yesterdy  A social  Service consult  For  Home  Care  Mainly  Pt  dont  Find an answer yet    Continue  Pt  In  Hospital          Walk on amaro  Often.    discontinue  Home  Today  If  Med  Cleared.  Can arrange  Home pt once home

## 2024-05-04 NOTE — PLAN OF CARE
Diagnosis:Right Total Hip Arthroplasty  POD#: 2  Mental Status: A&O x4  Activity/dangle: Ast of 1 GB/W  Diet: Regular diet  Pain: Managed with scheduled Tylenol  Rodriguez/Voiding: Voiding adequately in the bathroom  Tele/Restraints/Iso: N/A  02/LDA: Room air. IV saline locked  D/C Date: Pending  Other Info: Given 1x IV Zofran for nausea/vomiting and 1x Lozenges. CMS intact. R Hip dressing is CDI.

## 2024-05-05 ENCOUNTER — APPOINTMENT (OUTPATIENT)
Dept: GENERAL RADIOLOGY | Facility: CLINIC | Age: 74
DRG: 469 | End: 2024-05-05
Attending: ORTHOPAEDIC SURGERY
Payer: MEDICARE

## 2024-05-05 ENCOUNTER — APPOINTMENT (OUTPATIENT)
Dept: PHYSICAL THERAPY | Facility: CLINIC | Age: 74
DRG: 469 | End: 2024-05-05
Attending: ORTHOPAEDIC SURGERY
Payer: MEDICARE

## 2024-05-05 VITALS
SYSTOLIC BLOOD PRESSURE: 114 MMHG | WEIGHT: 125 LBS | TEMPERATURE: 97.9 F | BODY MASS INDEX: 21.34 KG/M2 | RESPIRATION RATE: 16 BRPM | HEIGHT: 64 IN | DIASTOLIC BLOOD PRESSURE: 69 MMHG | OXYGEN SATURATION: 97 % | HEART RATE: 93 BPM

## 2024-05-05 PROCEDURE — 73502 X-RAY EXAM HIP UNI 2-3 VIEWS: CPT

## 2024-05-05 PROCEDURE — 99232 SBSQ HOSP IP/OBS MODERATE 35: CPT | Performed by: HOSPITALIST

## 2024-05-05 PROCEDURE — 97530 THERAPEUTIC ACTIVITIES: CPT | Mod: GP

## 2024-05-05 PROCEDURE — 97116 GAIT TRAINING THERAPY: CPT | Mod: GP

## 2024-05-05 PROCEDURE — 250N000013 HC RX MED GY IP 250 OP 250 PS 637: Performed by: HOSPITALIST

## 2024-05-05 PROCEDURE — 250N000011 HC RX IP 250 OP 636: Performed by: STUDENT IN AN ORGANIZED HEALTH CARE EDUCATION/TRAINING PROGRAM

## 2024-05-05 PROCEDURE — 250N000013 HC RX MED GY IP 250 OP 250 PS 637: Performed by: ORTHOPAEDIC SURGERY

## 2024-05-05 RX ORDER — LOSARTAN POTASSIUM 50 MG/1
50 TABLET ORAL DAILY
Qty: 90 TABLET | Refills: 3 | Status: SHIPPED | OUTPATIENT
Start: 2024-05-05

## 2024-05-05 RX ORDER — SALIVA STIMULANT COMB. NO.3
2 SPRAY, NON-AEROSOL (ML) MUCOUS MEMBRANE 4 TIMES DAILY
Qty: 250 ML | Refills: 0 | Status: SHIPPED | OUTPATIENT
Start: 2024-05-05

## 2024-05-05 RX ADMIN — HYDROXYZINE HYDROCHLORIDE 10 MG: 10 TABLET ORAL at 06:11

## 2024-05-05 RX ADMIN — PANTOPRAZOLE SODIUM 40 MG: 40 TABLET, DELAYED RELEASE ORAL at 09:33

## 2024-05-05 RX ADMIN — ACETAMINOPHEN 650 MG: 325 TABLET, FILM COATED ORAL at 00:19

## 2024-05-05 RX ADMIN — HYDROXYZINE HYDROCHLORIDE 10 MG: 10 TABLET ORAL at 00:19

## 2024-05-05 RX ADMIN — Medication 2 SPRAY: at 14:01

## 2024-05-05 RX ADMIN — PIPERACILLIN AND TAZOBACTAM 3.38 G: 3; .375 INJECTION, POWDER, FOR SOLUTION INTRAVENOUS at 06:04

## 2024-05-05 RX ADMIN — PIPERACILLIN AND TAZOBACTAM 3.38 G: 3; .375 INJECTION, POWDER, FOR SOLUTION INTRAVENOUS at 11:50

## 2024-05-05 RX ADMIN — BENZOCAINE 6 MG-MENTHOL 10 MG LOZENGES 1 LOZENGE: at 06:04

## 2024-05-05 RX ADMIN — OXYBUTYNIN CHLORIDE 5 MG: 5 TABLET ORAL at 15:01

## 2024-05-05 RX ADMIN — BENZONATATE 100 MG: 100 CAPSULE ORAL at 06:04

## 2024-05-05 RX ADMIN — Medication 2 SPRAY: at 09:33

## 2024-05-05 RX ADMIN — ASPIRIN 81 MG: 81 TABLET, COATED ORAL at 09:33

## 2024-05-05 RX ADMIN — EZETIMIBE 10 MG: 10 TABLET ORAL at 09:33

## 2024-05-05 RX ADMIN — GUAIFENESIN 600 MG: 600 TABLET, EXTENDED RELEASE ORAL at 09:33

## 2024-05-05 RX ADMIN — BENZOCAINE 6 MG-MENTHOL 10 MG LOZENGES 1 LOZENGE: at 09:33

## 2024-05-05 RX ADMIN — Medication 2 SPRAY: at 01:51

## 2024-05-05 RX ADMIN — OXYBUTYNIN CHLORIDE 5 MG: 5 TABLET ORAL at 09:33

## 2024-05-05 RX ADMIN — BENZOCAINE 6 MG-MENTHOL 10 MG LOZENGES 1 LOZENGE: at 00:19

## 2024-05-05 RX ADMIN — PIPERACILLIN AND TAZOBACTAM 3.38 G: 3; .375 INJECTION, POWDER, FOR SOLUTION INTRAVENOUS at 00:25

## 2024-05-05 RX ADMIN — ACETAMINOPHEN 650 MG: 325 TABLET, FILM COATED ORAL at 06:04

## 2024-05-05 ASSESSMENT — ACTIVITIES OF DAILY LIVING (ADL)
ADLS_ACUITY_SCORE: 40

## 2024-05-05 NOTE — PROGRESS NOTES
"Ortho Progress Note     Subjective:  No acute overnight events. Notes ongoing R groin pain following a coughing spell, noted could not sleep all night as a result. Pain present even with small motions.  Difficulty mobilizing as a result.    Objective:  /64 (BP Location: Right arm)   Pulse 93   Temp 97  F (36.1  C) (Oral)   Resp 18   Ht 1.626 m (5' 4\")   Wt 56.7 kg (125 lb)   SpO2 94%   BMI 21.46 kg/m    Gen: A&Ox3, no acute distress  CV: 2+ dp/pt pulses, capillary refill < 2sec  Resp: breathing equal and non-labored, no wheezing  MSK:     RLE   Motor: EHL, TA, FHL, GS 5/5   SILT on SP, DP, S, S, and T nerve territories   Circulation: palpable DP and TP, foot warm   R groin pain substantial with psoas flexion, mild IR and ER      Hemoglobin   Date Value Ref Range Status   05/04/2024 8.5 (L) 11.7 - 15.7 g/dL Final   05/03/2024 8.3 (L) 11.7 - 15.7 g/dL Final   05/03/2024 8.3 (L) 11.7 - 15.7 g/dL Final   04/17/2020 12.9 11.7 - 15.7 g/dL Final   09/01/2019 12.1 11.7 - 15.7 g/dL Final         Assessment/Plan:  s/p 5/1/24 R MINDI with Dr Rodriguez, postop course complicated by aspiration pneumonia.     XR pelvis and R hip to confirm hip located given escalation of pain after coughing spell    -Weight Bearing Status: WBAT   -Diet: Regular    -Disposition: Home today if cleared by medicine and PT, and if XR obtained and show no complications      Miki Tidwell MD  Orthopedic Spine Surgery  [facility]   "

## 2024-05-05 NOTE — PLAN OF CARE
Diagnosis: Right Total Hip Arthroplasty  POD#: 3  Mental Status: A&O x4  Activity/dangle: SB Ast w/ GB/W  Diet: Regular diet  Pain: Managed with PRN Norco and Tylenol  Rodriguez/Voiding: Voiding in the bathroom  Tele/Restraints/Iso: N/A  02/LDA: Room air. IV saline locked  D/C Date: Possible discharge 5/5/24  Other Info: Given PRN Lozenge and Tessalon.

## 2024-05-05 NOTE — PROGRESS NOTES
[facility]    Medicine Progress Note - Hospitalist Service    Date of Admission:  5/1/2024    Assessment & Plan   Ania Castillo is a 74 year old female with a PMH significant for CAD, HTN, HLD, DM2, depression, anxiety, and GERD admitted on 5/1/2024 for an elective right total hip arthoplasty. Hospitalist was consulted for co-medical management in s/p MINDI.     Osteoarthritis  S/p right total hip arthoplasty  Had R MINDI with Dr. Rodriguez 5/1. No intraoperative complications.  - Defer post-operative cares including analgesia, anticoagulation, perioperative antibiotics and PT/OT to orthopedic surgery  -Discharge as per Orthopedic surgery.     Aspiration pneumonia, improving  Patient had aspiration event intraoperatively. CXR immediately following demonstrated patchy left lung opacity consistent with aspiration pneumonitis.  -Patient was on IV zosyn while here.  - clinically improving today, now off oxygen  -Seen the patient did not have general anesthesia, this likely would not explain her aspiration events  -SLP consulted: Input appreciated.  Patient will need to follow-up with GI on discharge for esophageal dysfunction.  -Patient to hold her PTA losartan on discharge due to soft blood pressures while here.  She should follow-up with her PCP to discuss when that should be safely restarted.  -Continue PTA metoprolol on discharge.    DM type 2  Last HgbA1c 6.1. Glucose has been well controlled today  - Glucose checks BID and HS     Depression  Anxiety  - Continue PTA nortriptyline     GERD  - Continue PTA omeprazole    Iron deficiency anemia  - stable, OP work up            Clinically Significant Risk Factors                  # Hypertension: Noted on problem list                   Disposition Plan     Medically Ready for Discharge: Anticipated Tomorrow             Danni Yusuf MD  Hospitalist Service  [facility]  Securely message with [employer] (more info)  Text page via [employer]  Paging/Directory   ______________________________________________________________________    Interval History   Patient doing much better today, Ok to discharge from a medical standpoint.     Physical Exam   Vital Signs: Temp: 97.9  F (36.6  C) Temp src: Oral BP: 114/69 Pulse: 93   Resp: 16 SpO2: 97 % O2 Device: None (Room air)    Weight: 125 lbs 0 oz    Constitutional: Awake, alert, cooperative, no apparent distress  Respiratory: no wheezing  Skin/Integumen: No rashes, no cyanosis, no edema  Other:      Medical Decision Making       35 MINUTES SPENT BY ME on the date of service doing chart review, history, exam, documentation & further activities per the note.      Data         Imaging results reviewed over the past 24 hrs:   Recent Results (from the past 24 hour(s))   XR Pelvis w Hip Right 1 View    Narrative    EXAM: XR PELVIS AND HIP RIGHT 1 VIEW  LOCATION: [facility]  DATE: 05/05/2024    INDICATION: Confirm MINDI location, pain increase after coughing spell.  COMPARISON: 05/01/2024.      Impression    IMPRESSION: Right total hip replacement is again seen, with one of the acetabular screws extending into the soft tissues along the right pelvic sidewall. There is no dislocation. There is a somewhat deep acetabular socket with thinning of the medial   acetabular wall. A superimposed right acetabular fracture is difficult to exclude based on this radiographic study and if there is concern for a fracture, CT is recommended.    NOTE: ABNORMAL REPORT    THE DICTATION ABOVE DESCRIBES AN ABNORMALITY FOR WHICH FOLLOW-UP IS NEEDED.

## 2024-05-05 NOTE — PLAN OF CARE
Goal Outcome Evaluation:  Summary:    Diagnosis: Right Total Hip Arthroplasty  POD#: 4  Mental Status: A&O x4  Activity/dangle: SBaGB/W  Diet: Regular diet  Pain:  Tylenol/Atarax  Voiding: Voiding in the bathroom  Tele/Restraints/Iso: N/A  02/LDA: Room air. IV SL  D/C Date: Possible discharge 5/5/24  Other Info: cough; PRN Lozenge and Tessalon available    5/4/2024 [id number]

## 2024-05-05 NOTE — PLAN OF CARE
Diagnosis: Right total hip arthroplasty  POD#: 4  Mental Status: A&O x4  Activity/dangle: SB Ast w/ GB/W  Diet: Regular diet  Pain: Managed with Tylenol  Rodriguez/Voiding: Voiding in the bathroom  Tele/Restraints/Iso: N/A  02/LDA: Room air. PIV removed  Other Info: Right Hip dressing is CDI. CMS intact. Went over AVS and discharge medications. All belongings taken by pt. Discharge home with grandson.

## 2024-05-06 NOTE — PLAN OF CARE
Occupational Therapy Discharge Summary    Reason for therapy discharge:    Discharged to home.    Progress towards therapy goal(s). See goals on Care Plan in [facility] electronic health record for goal details.  Goals partially met.  Barriers to achieving goals:   discharge from facility.    Therapy recommendation(s):    Pt impacted and limited significantly by pain and coughing increasing pain this date. Pt unable to sit EOB due to pain, but was ambulating with CGA/walker, required some assist with AE for LB dressing and SBA/mod I for toileting and g/h. Anticipate pt med mgmt and recovery, pt will be safe to return home with assit from family for household IADLs, mobility, dressing, and showering.

## 2024-05-06 NOTE — PLAN OF CARE
Speech Language Therapy Discharge Summary    Reason for therapy discharge:    Discharged to home.    Progress towards therapy goal(s). See goals on Care Plan in [facility] electronic health record for goal details.  Goals partially met.  Barriers to achieving goals:   discharge from facility.    Therapy recommendation(s):    See below    Clinical Impression Comments from 5/4/24    Clinical swallow evaluation completed with thin liquids, small amounts of puree/regular solids. Pt currently presents with suspected functional oropharyngeal swallow, suspect esophageal dysfunction. Oromotor exam WFL  besides xerostomia, minimal hoarse vocal quality. Pt IND with self-feeding and demonstrated WFL labial seal, WFL oral containment, WFL mastication/oral clearance, no percievable delay in swallow initiation, notable laryngeal elevation to palpation, single swallows per bolus and no overt clinical signs/sx aspiration noted. Pt denies difficulty swallowing when asked. She does either present with and/or report the following symptoms, which are c/w esophageal dysfunction: significantly delayed dry cough x1, frequent belching with limited PO, reports of vomitting after eating/drinking this admission, hiatal hernia per recent CxR. From an SLP perspective OK for regular/thin with strategies, educated on pharyngoesophageal clearance strategies and anti-refux precautions. Also educated on xerostomia reduction strategies and potential biotene pending MD order.      SLP Total Evaluation Time   Eval: oral/pharyngeal swallow function, clinical swallow Minutes (07359) 15   SLP Goals   Therapy Frequency (SLP Eval) 1 time/week   SLP Predicted Duration/Target Date for Goal Attainment 05/11/24   SLP Goals Swallow   SLP: Safely tolerate diet without signs/symptoms of aspiration Regular diet;Thin liquids;With use of swallow precautions;Independently   SLP Discharge Planning   SLP Plan strategy reinforcement/education as needed   SLP Discharge  Recommendation home   SLP Rationale for DC Rec no further SLP needs post-acute, consider OP GI work up/consult   SLP Brief overview of current status  Regular, thin with self-selecting softer/moister options, small bites, alternate between solids/liquids, upright for all PO/30-60 minutes after; frequent sips of water throughout the day and before food/pills with ?potential biotene for xerostomia. Consider GI consult /esophageal work up - can be completed as OP   Total Session Time   Total Session Time (sum of timed and untimed services) 15

## 2024-05-06 NOTE — PROGRESS NOTES
Care Management Follow Up    PLEASE NOTE PATIENT HAS DISCHARGED ALREADY on 5/5/2024      Corrier received a phone call from PHI Javier at [facility].    Concepcion YIP from [facility] informed writer that there are no orders for Home Care. Writer checked patient's chart and it was noted by Dr Rodriguez that he wanted Home Care, but orders were not completed. Corrier attempted to call Dr Rodriguez's Care Coordinator Vikki. Left her a message that PT only orders need to be put in for [facility]. Writer also left message for Dr Rodriguez too. [facility] informed Dr Rodriguez was in surgery until 1300 today. Writer informed Concepcion to call back later if orders have not been placed. Concepcion already has PT set up to see patient tomorrow and has talked to the family. Writer will watch too for orders.                                   Dulce Crowder RN  Care Coordinator  [phone]

## 2024-05-06 NOTE — PLAN OF CARE
Physical Therapy Discharge Summary    Reason for therapy discharge:    Discharged to home with home therapy.    Progress towards therapy goal(s). See goals on Care Plan in [facility] electronic health record for goal details.  Goals not met.  Barriers to achieving goals:   discharge from facility.    Therapy recommendation(s):    Continued therapy is recommended.  Rationale/Recommendations:  Home PT to progress independence and safety with functional mobility.

## 2024-05-10 NOTE — DISCHARGE SUMMARY
Discharge summary      Patient   cassy Barrera    Medical record [id number] [facility] #[id number]    Surgical date 5/1/2024    Discharge 5 6.    Patient was admitted 5/1/2024 under spinal anesthesia had a right total hip arthroplasty done.  Surgical procedure went just fine.  Postop she was suffering from a lot of nausea and vomiting.  Medical workup really never found anything.  We stopped all narcotics all usual things but nothing made a big difference.    She was discharged home finally doing well.  Maximum med use would be one half of Percocet a day and a half oxycodone.  Every 6 hours or so.  Wound was healing per primam labs check [employer] computer.  She probably should be on some iron but with her GI issues she had it would not hold off starting that.  She was otherwise going to resume all.  Also possible care plan Steitz medications.    She had a coughing episode and sudden pain in the right hip.  X-rays were taken there was no dislocations or fractures seen.    The importance of self-guided exercise gone over there.  If she wants an outpatient therapy she will let us know but otherwise just short walks general was all we do.    Dr. Rodriguez dictating discharge summary

## 2024-05-14 ENCOUNTER — HOSPITAL ENCOUNTER (OUTPATIENT)
Dept: CARDIOLOGY | Facility: CLINIC | Age: 74
Discharge: HOME | End: 2024-05-14
Attending: INTERNAL MEDICINE | Admitting: INTERNAL MEDICINE
Payer: MEDICARE

## 2024-05-14 ENCOUNTER — LAB (OUTPATIENT)
Dept: LAB | Facility: CLINIC | Age: 74
End: 2024-05-14
Payer: MEDICARE

## 2024-05-14 DIAGNOSIS — I25.10 CORONARY ARTERY DISEASE INVOLVING NATIVE CORONARY ARTERY OF NATIVE HEART WITHOUT ANGINA PECTORIS: ICD-10-CM

## 2024-05-14 DIAGNOSIS — E78.00 PURE HYPERCHOLESTEROLEMIA: ICD-10-CM

## 2024-05-14 LAB
ALT SERPL W P-5'-P-CCNC: 14 U/L (ref 0–50)
CHOLEST SERPL-MCNC: 198 MG/DL
FASTING STATUS PATIENT QL REPORTED: YES
HDLC SERPL-MCNC: 44 MG/DL
LDLC SERPL CALC-MCNC: 127 MG/DL
LVEF ECHO: NORMAL
NONHDLC SERPL-MCNC: 154 MG/DL
TRIGL SERPL-MCNC: 133 MG/DL

## 2024-05-14 PROCEDURE — 84460 ALANINE AMINO (ALT) (SGPT): CPT

## 2024-05-14 PROCEDURE — 36415 COLL VENOUS BLD VENIPUNCTURE: CPT

## 2024-05-14 PROCEDURE — 255N000002 HC RX 255 OP 636: Performed by: INTERNAL MEDICINE

## 2024-05-14 PROCEDURE — 999N000208 ECHOCARDIOGRAM COMPLETE

## 2024-05-14 PROCEDURE — 93306 TTE W/DOPPLER COMPLETE: CPT | Mod: 26 | Performed by: INTERNAL MEDICINE

## 2024-05-14 PROCEDURE — 80061 LIPID PANEL: CPT

## 2024-05-14 PROCEDURE — C8929 TTE W OR WO FOL WCON,DOPPLER: HCPCS

## 2024-05-14 RX ADMIN — HUMAN ALBUMIN MICROSPHERES AND PERFLUTREN 3 ML: 10; .22 INJECTION, SOLUTION INTRAVENOUS at 13:28

## 2024-05-21 ENCOUNTER — LAB (OUTPATIENT)
Dept: LAB | Facility: CLINIC | Age: 74
End: 2024-05-21
Payer: MEDICARE

## 2024-05-21 ENCOUNTER — OFFICE VISIT (OUTPATIENT)
Dept: CARDIOLOGY | Facility: CLINIC | Age: 74
End: 2024-05-21
Payer: MEDICARE

## 2024-05-21 ENCOUNTER — TELEPHONE (OUTPATIENT)
Dept: CARDIOLOGY | Facility: CLINIC | Age: 74
End: 2024-05-21

## 2024-05-21 VITALS
HEIGHT: 63 IN | HEART RATE: 84 BPM | DIASTOLIC BLOOD PRESSURE: 63 MMHG | BODY MASS INDEX: 21.55 KG/M2 | SYSTOLIC BLOOD PRESSURE: 104 MMHG | WEIGHT: 121.6 LBS

## 2024-05-21 DIAGNOSIS — E11.9 TYPE 2 DIABETES MELLITUS WITHOUT COMPLICATION, WITHOUT LONG-TERM CURRENT USE OF INSULIN (H): ICD-10-CM

## 2024-05-21 DIAGNOSIS — Z96.641 HX OF TOTAL HIP ARTHROPLASTY, RIGHT: ICD-10-CM

## 2024-05-21 DIAGNOSIS — I25.42 SPONTANEOUS DISSECTION OF CORONARY ARTERY: ICD-10-CM

## 2024-05-21 DIAGNOSIS — I10 ESSENTIAL HYPERTENSION: ICD-10-CM

## 2024-05-21 DIAGNOSIS — E78.00 PURE HYPERCHOLESTEROLEMIA: ICD-10-CM

## 2024-05-21 DIAGNOSIS — I25.10 CORONARY ARTERY DISEASE INVOLVING NATIVE CORONARY ARTERY OF NATIVE HEART WITHOUT ANGINA PECTORIS: Primary | ICD-10-CM

## 2024-05-21 DIAGNOSIS — I25.10 CORONARY ARTERY DISEASE INVOLVING NATIVE CORONARY ARTERY OF NATIVE HEART WITHOUT ANGINA PECTORIS: ICD-10-CM

## 2024-05-21 LAB
ERYTHROCYTE [DISTWIDTH] IN BLOOD BY AUTOMATED COUNT: 13.4 % (ref 10–15)
HCT VFR BLD AUTO: 33.2 % (ref 35–47)
HGB BLD-MCNC: 10.3 G/DL (ref 11.7–15.7)
MCH RBC QN AUTO: 28.2 PG (ref 26.5–33)
MCHC RBC AUTO-ENTMCNC: 31 G/DL (ref 31.5–36.5)
MCV RBC AUTO: 91 FL (ref 78–100)
PLATELET # BLD AUTO: 773 10E3/UL (ref 150–450)
RBC # BLD AUTO: 3.65 10E6/UL (ref 3.8–5.2)
WBC # BLD AUTO: 7.2 10E3/UL (ref 4–11)

## 2024-05-21 PROCEDURE — 36415 COLL VENOUS BLD VENIPUNCTURE: CPT

## 2024-05-21 PROCEDURE — 99214 OFFICE O/P EST MOD 30 MIN: CPT | Mod: 24 | Performed by: INTERNAL MEDICINE

## 2024-05-21 PROCEDURE — 85014 HEMATOCRIT: CPT

## 2024-05-21 NOTE — TELEPHONE ENCOUNTER
Labs post office visit:    Component      Latest Ref Rng 5/21/2024  4:07 PM   WBC      4.0 - 11.0 10e3/uL 7.2    RBC Count      3.80 - 5.20 10e6/uL 3.65 (L)    Hemoglobin      11.7 - 15.7 g/dL 10.3 (L)    Hematocrit      35.0 - 47.0 % 33.2 (L)    MCV      78 - 100 fL 91    MCH      26.5 - 33.0 pg 28.2    MCHC      31.5 - 36.5 g/dL 31.0 (L)    RDW      10.0 - 15.0 % 13.4    Platelet Count      150 - 450 10e3/uL 773 (H)       DESIREE Gaxiola RN
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
## 2024-05-22 DIAGNOSIS — I25.10 CORONARY ARTERY DISEASE INVOLVING NATIVE CORONARY ARTERY OF NATIVE HEART WITHOUT ANGINA PECTORIS: ICD-10-CM

## 2024-05-22 RX ORDER — EZETIMIBE 10 MG/1
10 TABLET ORAL DAILY
Qty: 90 TABLET | Refills: 3 | Status: SHIPPED | OUTPATIENT
Start: 2024-05-22

## 2024-05-23 ENCOUNTER — TELEPHONE (OUTPATIENT)
Dept: CARDIOLOGY | Facility: CLINIC | Age: 74
End: 2024-05-23
Payer: MEDICARE

## 2024-06-06 DIAGNOSIS — K21.9 GASTROESOPHAGEAL REFLUX DISEASE WITHOUT ESOPHAGITIS: ICD-10-CM

## 2024-06-06 RX ORDER — OMEPRAZOLE 20 MG/1
20 TABLET, DELAYED RELEASE ORAL DAILY
Qty: 30 TABLET | Refills: 11 | Status: SHIPPED | OUTPATIENT
Start: 2024-06-06

## 2024-07-09 ENCOUNTER — TELEPHONE (OUTPATIENT)
Dept: CARDIOLOGY | Facility: CLINIC | Age: 74
End: 2024-07-09
Payer: MEDICARE

## 2024-07-09 NOTE — TELEPHONE ENCOUNTER
Return Pt call reviewed DR Mario put orders in for her to be seen next year 5/25. DESIREE Gaxoila RN

## 2024-09-18 ENCOUNTER — LAB (OUTPATIENT)
Dept: LAB | Facility: CLINIC | Age: 74
End: 2024-09-18
Payer: MEDICARE

## 2024-09-18 DIAGNOSIS — I10 ESSENTIAL HYPERTENSION: ICD-10-CM

## 2024-09-18 DIAGNOSIS — E78.00 PURE HYPERCHOLESTEROLEMIA: ICD-10-CM

## 2024-09-18 LAB
CHOLEST SERPL-MCNC: 131 MG/DL
FASTING STATUS PATIENT QL REPORTED: YES
HDLC SERPL-MCNC: 65 MG/DL
LDLC SERPL CALC-MCNC: 58 MG/DL
NONHDLC SERPL-MCNC: 66 MG/DL
TRIGL SERPL-MCNC: 42 MG/DL

## 2024-09-18 PROCEDURE — 80061 LIPID PANEL: CPT | Performed by: INTERNAL MEDICINE

## 2024-09-18 PROCEDURE — 36415 COLL VENOUS BLD VENIPUNCTURE: CPT | Performed by: INTERNAL MEDICINE

## 2024-09-19 ENCOUNTER — TELEPHONE (OUTPATIENT)
Dept: CARDIOLOGY | Facility: CLINIC | Age: 74
End: 2024-09-19
Payer: MEDICARE

## 2024-09-19 NOTE — TELEPHONE ENCOUNTER
Component      Latest Ref Rng 9/18/2024  9:20 AM   Cholesterol      <200 mg/dL 131    Triglycerides      <150 mg/dL 42    HDL Cholesterol      >=50 mg/dL 65    LDL Cholesterol Calculated      <100 mg/dL 58    Non HDL Cholesterol      <130 mg/dL 66    Patient Fasting? Yes      Labs at goal, message left for Faith Gaxiola RN

## 2025-01-31 ENCOUNTER — APPOINTMENT (OUTPATIENT)
Dept: CARDIOLOGY | Facility: CLINIC | Age: 75
End: 2025-01-31
Attending: INTERNAL MEDICINE
Payer: MEDICARE

## 2025-01-31 ENCOUNTER — APPOINTMENT (OUTPATIENT)
Dept: CT IMAGING | Facility: CLINIC | Age: 75
End: 2025-01-31
Attending: EMERGENCY MEDICINE
Payer: MEDICARE

## 2025-01-31 ENCOUNTER — HOSPITAL ENCOUNTER (OUTPATIENT)
Facility: CLINIC | Age: 75
Setting detail: OBSERVATION
Discharge: HOME | End: 2025-02-01
Attending: EMERGENCY MEDICINE | Admitting: HOSPITALIST
Payer: MEDICARE

## 2025-01-31 DIAGNOSIS — R07.89 CHEST DISCOMFORT: ICD-10-CM

## 2025-01-31 DIAGNOSIS — I21.4 NSTEMI (NON-ST ELEVATED MYOCARDIAL INFARCTION) (H): Primary | ICD-10-CM

## 2025-01-31 DIAGNOSIS — I20.0 UNSTABLE ANGINA (H): ICD-10-CM

## 2025-01-31 LAB
ANION GAP SERPL CALCULATED.3IONS-SCNC: 9 MMOL/L (ref 7–15)
ATRIAL RATE - MUSE: 67 BPM
ATRIAL RATE - MUSE: 68 BPM
BASOPHILS # BLD AUTO: 0 10E3/UL (ref 0–0.2)
BASOPHILS NFR BLD AUTO: 1 %
BUN SERPL-MCNC: 13.8 MG/DL (ref 8–23)
CALCIUM SERPL-MCNC: 9.6 MG/DL (ref 8.8–10.4)
CHLORIDE SERPL-SCNC: 103 MMOL/L (ref 98–107)
CREAT SERPL-MCNC: 0.78 MG/DL (ref 0.51–0.95)
D DIMER PPP FEU-MCNC: 0.83 UG/ML FEU (ref 0–0.5)
DIASTOLIC BLOOD PRESSURE - MUSE: NORMAL MMHG
DIASTOLIC BLOOD PRESSURE - MUSE: NORMAL MMHG
EGFRCR SERPLBLD CKD-EPI 2021: 79 ML/MIN/1.73M2
EOSINOPHIL # BLD AUTO: 0.2 10E3/UL (ref 0–0.7)
EOSINOPHIL NFR BLD AUTO: 3 %
ERYTHROCYTE [DISTWIDTH] IN BLOOD BY AUTOMATED COUNT: 12.7 % (ref 10–15)
EST. AVERAGE GLUCOSE BLD GHB EST-MCNC: 131 MG/DL
GLUCOSE BLDC GLUCOMTR-MCNC: 120 MG/DL (ref 70–99)
GLUCOSE SERPL-MCNC: 119 MG/DL (ref 70–99)
HBA1C MFR BLD: 6.2 %
HCO3 SERPL-SCNC: 28 MMOL/L (ref 22–29)
HCT VFR BLD AUTO: 37.3 % (ref 35–47)
HGB BLD-MCNC: 12.2 G/DL (ref 11.7–15.7)
HOLD SPECIMEN: NORMAL
HOLD SPECIMEN: NORMAL
IMM GRANULOCYTES # BLD: 0 10E3/UL
IMM GRANULOCYTES NFR BLD: 0 %
INTERPRETATION ECG - MUSE: NORMAL
INTERPRETATION ECG - MUSE: NORMAL
LVEF ECHO: NORMAL
LYMPHOCYTES # BLD AUTO: 2.4 10E3/UL (ref 0.8–5.3)
LYMPHOCYTES NFR BLD AUTO: 41 %
MCH RBC QN AUTO: 29.5 PG (ref 26.5–33)
MCHC RBC AUTO-ENTMCNC: 32.7 G/DL (ref 31.5–36.5)
MCV RBC AUTO: 90 FL (ref 78–100)
MONOCYTES # BLD AUTO: 0.6 10E3/UL (ref 0–1.3)
MONOCYTES NFR BLD AUTO: 10 %
NEUTROPHILS # BLD AUTO: 2.7 10E3/UL (ref 1.6–8.3)
NEUTROPHILS NFR BLD AUTO: 45 %
NRBC # BLD AUTO: 0 10E3/UL
NRBC BLD AUTO-RTO: 0 /100
P AXIS - MUSE: 57 DEGREES
P AXIS - MUSE: 60 DEGREES
PLATELET # BLD AUTO: 292 10E3/UL (ref 150–450)
POTASSIUM SERPL-SCNC: 4.3 MMOL/L (ref 3.4–5.3)
PR INTERVAL - MUSE: 158 MS
PR INTERVAL - MUSE: 160 MS
QRS DURATION - MUSE: 80 MS
QRS DURATION - MUSE: 80 MS
QT - MUSE: 386 MS
QT - MUSE: 390 MS
QTC - MUSE: 407 MS
QTC - MUSE: 414 MS
R AXIS - MUSE: 25 DEGREES
R AXIS - MUSE: 44 DEGREES
RBC # BLD AUTO: 4.14 10E6/UL (ref 3.8–5.2)
SODIUM SERPL-SCNC: 140 MMOL/L (ref 135–145)
SYSTOLIC BLOOD PRESSURE - MUSE: NORMAL MMHG
SYSTOLIC BLOOD PRESSURE - MUSE: NORMAL MMHG
T AXIS - MUSE: 79 DEGREES
T AXIS - MUSE: 95 DEGREES
TROPONIN T SERPL HS-MCNC: 14 NG/L
TROPONIN T SERPL HS-MCNC: 16 NG/L
TROPONIN T SERPL HS-MCNC: 18 NG/L
VENTRICULAR RATE- MUSE: 67 BPM
VENTRICULAR RATE- MUSE: 68 BPM
WBC # BLD AUTO: 6 10E3/UL (ref 4–11)

## 2025-01-31 PROCEDURE — 82962 GLUCOSE BLOOD TEST: CPT

## 2025-01-31 PROCEDURE — 93005 ELECTROCARDIOGRAM TRACING: CPT | Mod: 76

## 2025-01-31 PROCEDURE — 83036 HEMOGLOBIN GLYCOSYLATED A1C: CPT | Performed by: HOSPITALIST

## 2025-01-31 PROCEDURE — 250N000011 HC RX IP 250 OP 636: Performed by: EMERGENCY MEDICINE

## 2025-01-31 PROCEDURE — 93454 CORONARY ARTERY ANGIO S&I: CPT | Mod: 26 | Performed by: INTERNAL MEDICINE

## 2025-01-31 PROCEDURE — 85379 FIBRIN DEGRADATION QUANT: CPT | Performed by: EMERGENCY MEDICINE

## 2025-01-31 PROCEDURE — C8929 TTE W OR WO FOL WCON,DOPPLER: HCPCS

## 2025-01-31 PROCEDURE — 71275 CT ANGIOGRAPHY CHEST: CPT

## 2025-01-31 PROCEDURE — 36415 COLL VENOUS BLD VENIPUNCTURE: CPT | Performed by: HOSPITALIST

## 2025-01-31 PROCEDURE — 250N000013 HC RX MED GY IP 250 OP 250 PS 637: Performed by: INTERNAL MEDICINE

## 2025-01-31 PROCEDURE — 99222 1ST HOSP IP/OBS MODERATE 55: CPT | Performed by: HOSPITALIST

## 2025-01-31 PROCEDURE — 99152 MOD SED SAME PHYS/QHP 5/>YRS: CPT | Performed by: INTERNAL MEDICINE

## 2025-01-31 PROCEDURE — 85025 COMPLETE CBC W/AUTO DIFF WBC: CPT | Performed by: EMERGENCY MEDICINE

## 2025-01-31 PROCEDURE — 258N000003 HC RX IP 258 OP 636: Performed by: EMERGENCY MEDICINE

## 2025-01-31 PROCEDURE — 84484 ASSAY OF TROPONIN QUANT: CPT | Performed by: EMERGENCY MEDICINE

## 2025-01-31 PROCEDURE — 99223 1ST HOSP IP/OBS HIGH 75: CPT | Mod: 25 | Performed by: INTERNAL MEDICINE

## 2025-01-31 PROCEDURE — 250N000011 HC RX IP 250 OP 636: Performed by: INTERNAL MEDICINE

## 2025-01-31 PROCEDURE — 250N000013 HC RX MED GY IP 250 OP 250 PS 637: Performed by: EMERGENCY MEDICINE

## 2025-01-31 PROCEDURE — 250N000013 HC RX MED GY IP 250 OP 250 PS 637: Performed by: HOSPITALIST

## 2025-01-31 PROCEDURE — 255N000002 HC RX 255 OP 636: Performed by: INTERNAL MEDICINE

## 2025-01-31 PROCEDURE — 93005 ELECTROCARDIOGRAM TRACING: CPT | Mod: 59

## 2025-01-31 PROCEDURE — 250N000009 HC RX 250: Performed by: INTERNAL MEDICINE

## 2025-01-31 PROCEDURE — 272N000001 HC OR GENERAL SUPPLY STERILE: Performed by: INTERNAL MEDICINE

## 2025-01-31 PROCEDURE — 250N000009 HC RX 250: Performed by: EMERGENCY MEDICINE

## 2025-01-31 PROCEDURE — G0378 HOSPITAL OBSERVATION PER HR: HCPCS

## 2025-01-31 PROCEDURE — 93306 TTE W/DOPPLER COMPLETE: CPT | Mod: 26 | Performed by: INTERNAL MEDICINE

## 2025-01-31 PROCEDURE — 999N000208 ECHOCARDIOGRAM COMPLETE

## 2025-01-31 PROCEDURE — 36415 COLL VENOUS BLD VENIPUNCTURE: CPT | Performed by: EMERGENCY MEDICINE

## 2025-01-31 PROCEDURE — C1894 INTRO/SHEATH, NON-LASER: HCPCS | Performed by: INTERNAL MEDICINE

## 2025-01-31 PROCEDURE — 80048 BASIC METABOLIC PNL TOTAL CA: CPT | Performed by: EMERGENCY MEDICINE

## 2025-01-31 PROCEDURE — 84484 ASSAY OF TROPONIN QUANT: CPT | Performed by: HOSPITALIST

## 2025-01-31 PROCEDURE — 99285 EMERGENCY DEPT VISIT HI MDM: CPT | Mod: 25

## 2025-01-31 PROCEDURE — 93454 CORONARY ARTERY ANGIO S&I: CPT | Performed by: INTERNAL MEDICINE

## 2025-01-31 PROCEDURE — 258N000003 HC RX IP 258 OP 636: Performed by: INTERNAL MEDICINE

## 2025-01-31 RX ORDER — IOPAMIDOL 755 MG/ML
INJECTION, SOLUTION INTRAVASCULAR
Status: DISCONTINUED | OUTPATIENT
Start: 2025-01-31 | End: 2025-01-31 | Stop reason: HOSPADM

## 2025-01-31 RX ORDER — ONDANSETRON 4 MG/1
4 TABLET, ORALLY DISINTEGRATING ORAL EVERY 6 HOURS PRN
Status: DISCONTINUED | OUTPATIENT
Start: 2025-01-31 | End: 2025-02-01 | Stop reason: HOSPADM

## 2025-01-31 RX ORDER — ASPIRIN 81 MG/1
243 TABLET, CHEWABLE ORAL ONCE
Status: COMPLETED | OUTPATIENT
Start: 2025-01-31 | End: 2025-01-31

## 2025-01-31 RX ORDER — OXYBUTYNIN CHLORIDE 5 MG/1
5 TABLET ORAL 2 TIMES DAILY
Status: DISCONTINUED | OUTPATIENT
Start: 2025-01-31 | End: 2025-02-01 | Stop reason: HOSPADM

## 2025-01-31 RX ORDER — OMEPRAZOLE 20 MG/1
20 TABLET, DELAYED RELEASE ORAL 2 TIMES DAILY
COMMUNITY

## 2025-01-31 RX ORDER — NORTRIPTYLINE HYDROCHLORIDE 50 MG/1
50 CAPSULE ORAL AT BEDTIME
Status: DISCONTINUED | OUTPATIENT
Start: 2025-01-31 | End: 2025-02-01 | Stop reason: HOSPADM

## 2025-01-31 RX ORDER — AMOXICILLIN 250 MG
2 CAPSULE ORAL 2 TIMES DAILY PRN
Status: DISCONTINUED | OUTPATIENT
Start: 2025-01-31 | End: 2025-02-01 | Stop reason: HOSPADM

## 2025-01-31 RX ORDER — ACETAMINOPHEN 650 MG/1
650 SUPPOSITORY RECTAL EVERY 4 HOURS PRN
Status: DISCONTINUED | OUTPATIENT
Start: 2025-01-31 | End: 2025-02-01 | Stop reason: HOSPADM

## 2025-01-31 RX ORDER — SODIUM CHLORIDE 9 MG/ML
INJECTION, SOLUTION INTRAVENOUS CONTINUOUS
Status: DISCONTINUED | OUTPATIENT
Start: 2025-01-31 | End: 2025-01-31 | Stop reason: HOSPADM

## 2025-01-31 RX ORDER — TRIAMCINOLONE ACETONIDE 1 MG/G
OINTMENT TOPICAL DAILY
COMMUNITY
Start: 2025-01-27

## 2025-01-31 RX ORDER — ATROPINE SULFATE 0.1 MG/ML
0.5 INJECTION INTRAVENOUS
Status: ACTIVE | OUTPATIENT
Start: 2025-01-31 | End: 2025-01-31

## 2025-01-31 RX ORDER — NITROGLYCERIN 0.4 MG/1
0.4 TABLET SUBLINGUAL EVERY 5 MIN PRN
Status: DISCONTINUED | OUTPATIENT
Start: 2025-01-31 | End: 2025-01-31

## 2025-01-31 RX ORDER — AMOXICILLIN 250 MG
1 CAPSULE ORAL 2 TIMES DAILY PRN
Status: DISCONTINUED | OUTPATIENT
Start: 2025-01-31 | End: 2025-02-01 | Stop reason: HOSPADM

## 2025-01-31 RX ORDER — METOPROLOL TARTRATE 25 MG/1
25 TABLET, FILM COATED ORAL 2 TIMES DAILY
Status: DISCONTINUED | OUTPATIENT
Start: 2025-01-31 | End: 2025-01-31 | Stop reason: ALTCHOICE

## 2025-01-31 RX ORDER — PANTOPRAZOLE SODIUM 40 MG/1
40 TABLET, DELAYED RELEASE ORAL 2 TIMES DAILY
Status: DISCONTINUED | OUTPATIENT
Start: 2025-01-31 | End: 2025-02-01 | Stop reason: HOSPADM

## 2025-01-31 RX ORDER — NALOXONE HYDROCHLORIDE 0.4 MG/ML
0.4 INJECTION, SOLUTION INTRAMUSCULAR; INTRAVENOUS; SUBCUTANEOUS
Status: ACTIVE | OUTPATIENT
Start: 2025-01-31 | End: 2025-01-31

## 2025-01-31 RX ORDER — PROCHLORPERAZINE MALEATE 5 MG/1
5 TABLET ORAL EVERY 6 HOURS PRN
Status: DISCONTINUED | OUTPATIENT
Start: 2025-01-31 | End: 2025-02-01 | Stop reason: HOSPADM

## 2025-01-31 RX ORDER — ASPIRIN 81 MG/1
162 TABLET, CHEWABLE ORAL ONCE
Status: COMPLETED | OUTPATIENT
Start: 2025-01-31 | End: 2025-01-31

## 2025-01-31 RX ORDER — LIDOCAINE 40 MG/G
CREAM TOPICAL
Status: DISCONTINUED | OUTPATIENT
Start: 2025-01-31 | End: 2025-01-31 | Stop reason: HOSPADM

## 2025-01-31 RX ORDER — FLUMAZENIL 0.1 MG/ML
0.2 INJECTION, SOLUTION INTRAVENOUS
Status: ACTIVE | OUTPATIENT
Start: 2025-01-31 | End: 2025-01-31

## 2025-01-31 RX ORDER — IOPAMIDOL 755 MG/ML
500 INJECTION, SOLUTION INTRAVASCULAR ONCE
Status: COMPLETED | OUTPATIENT
Start: 2025-01-31 | End: 2025-01-31

## 2025-01-31 RX ORDER — ISOSORBIDE MONONITRATE 30 MG/1
30 TABLET, EXTENDED RELEASE ORAL DAILY
Status: DISCONTINUED | OUTPATIENT
Start: 2025-01-31 | End: 2025-02-01 | Stop reason: HOSPADM

## 2025-01-31 RX ORDER — AMLODIPINE BESYLATE 2.5 MG/1
2.5 TABLET ORAL EVERY EVENING
Status: DISCONTINUED | OUTPATIENT
Start: 2025-01-31 | End: 2025-02-01 | Stop reason: HOSPADM

## 2025-01-31 RX ORDER — DEXTROSE MONOHYDRATE 25 G/50ML
25-50 INJECTION, SOLUTION INTRAVENOUS
Status: DISCONTINUED | OUTPATIENT
Start: 2025-01-31 | End: 2025-02-01 | Stop reason: HOSPADM

## 2025-01-31 RX ORDER — ASPIRIN 81 MG/1
81 TABLET ORAL DAILY
Status: DISCONTINUED | OUTPATIENT
Start: 2025-02-01 | End: 2025-02-01 | Stop reason: HOSPADM

## 2025-01-31 RX ORDER — ASPIRIN 81 MG/1
81 TABLET ORAL DAILY
COMMUNITY

## 2025-01-31 RX ORDER — LOSARTAN POTASSIUM 50 MG/1
50 TABLET ORAL EVERY EVENING
Status: DISCONTINUED | OUTPATIENT
Start: 2025-01-31 | End: 2025-02-01 | Stop reason: HOSPADM

## 2025-01-31 RX ORDER — ACETAMINOPHEN 325 MG/1
650 TABLET ORAL EVERY 4 HOURS PRN
Status: DISCONTINUED | OUTPATIENT
Start: 2025-01-31 | End: 2025-02-01 | Stop reason: HOSPADM

## 2025-01-31 RX ORDER — FENTANYL CITRATE 50 UG/ML
25 INJECTION, SOLUTION INTRAMUSCULAR; INTRAVENOUS
Status: DISCONTINUED | OUTPATIENT
Start: 2025-01-31 | End: 2025-02-01

## 2025-01-31 RX ORDER — POLYETHYLENE GLYCOL 3350 17 G/17G
17 POWDER, FOR SOLUTION ORAL 2 TIMES DAILY PRN
Status: DISCONTINUED | OUTPATIENT
Start: 2025-01-31 | End: 2025-02-01 | Stop reason: HOSPADM

## 2025-01-31 RX ORDER — ONDANSETRON 2 MG/ML
4 INJECTION INTRAMUSCULAR; INTRAVENOUS EVERY 6 HOURS PRN
Status: DISCONTINUED | OUTPATIENT
Start: 2025-01-31 | End: 2025-02-01 | Stop reason: HOSPADM

## 2025-01-31 RX ORDER — NALOXONE HYDROCHLORIDE 0.4 MG/ML
0.2 INJECTION, SOLUTION INTRAMUSCULAR; INTRAVENOUS; SUBCUTANEOUS
Status: ACTIVE | OUTPATIENT
Start: 2025-01-31 | End: 2025-01-31

## 2025-01-31 RX ORDER — ACETAMINOPHEN 325 MG/1
650 TABLET ORAL EVERY 4 HOURS PRN
Status: DISCONTINUED | OUTPATIENT
Start: 2025-01-31 | End: 2025-01-31

## 2025-01-31 RX ORDER — NICOTINE POLACRILEX 4 MG
15-30 LOZENGE BUCCAL
Status: DISCONTINUED | OUTPATIENT
Start: 2025-01-31 | End: 2025-02-01 | Stop reason: HOSPADM

## 2025-01-31 RX ORDER — HEPARIN SODIUM 10000 [USP'U]/100ML
0-5000 INJECTION, SOLUTION INTRAVENOUS CONTINUOUS
Status: DISCONTINUED | OUTPATIENT
Start: 2025-01-31 | End: 2025-01-31

## 2025-01-31 RX ORDER — FENTANYL CITRATE 50 UG/ML
INJECTION, SOLUTION INTRAMUSCULAR; INTRAVENOUS
Status: DISCONTINUED | OUTPATIENT
Start: 2025-01-31 | End: 2025-01-31 | Stop reason: HOSPADM

## 2025-01-31 RX ORDER — NITROGLYCERIN 5 MG/ML
VIAL (ML) INTRAVENOUS
Status: DISCONTINUED | OUTPATIENT
Start: 2025-01-31 | End: 2025-01-31 | Stop reason: HOSPADM

## 2025-01-31 RX ORDER — SODIUM CHLORIDE 9 MG/ML
75 INJECTION, SOLUTION INTRAVENOUS CONTINUOUS
Status: DISCONTINUED | OUTPATIENT
Start: 2025-01-31 | End: 2025-01-31

## 2025-01-31 RX ORDER — SIMVASTATIN 40 MG
80 TABLET ORAL AT BEDTIME
Status: DISCONTINUED | OUTPATIENT
Start: 2025-01-31 | End: 2025-02-01 | Stop reason: HOSPADM

## 2025-01-31 RX ORDER — POTASSIUM CHLORIDE 1500 MG/1
20 TABLET, EXTENDED RELEASE ORAL
Status: DISCONTINUED | OUTPATIENT
Start: 2025-01-31 | End: 2025-01-31 | Stop reason: HOSPADM

## 2025-01-31 RX ORDER — ASPIRIN 325 MG
325 TABLET ORAL ONCE
Status: COMPLETED | OUTPATIENT
Start: 2025-01-31 | End: 2025-01-31

## 2025-01-31 RX ORDER — EZETIMIBE 10 MG/1
10 TABLET ORAL EVERY EVENING
Status: DISCONTINUED | OUTPATIENT
Start: 2025-01-31 | End: 2025-02-01 | Stop reason: HOSPADM

## 2025-01-31 RX ADMIN — ACETAMINOPHEN 650 MG: 325 TABLET, FILM COATED ORAL at 15:04

## 2025-01-31 RX ADMIN — EZETIMIBE 10 MG: 10 TABLET ORAL at 21:19

## 2025-01-31 RX ADMIN — SODIUM CHLORIDE 1000 ML: 9 INJECTION, SOLUTION INTRAVENOUS at 11:01

## 2025-01-31 RX ADMIN — ASPIRIN 81 MG CHEWABLE TABLET 162 MG: 81 TABLET CHEWABLE at 09:23

## 2025-01-31 RX ADMIN — SIMVASTATIN 80 MG: 40 TABLET, FILM COATED ORAL at 21:19

## 2025-01-31 RX ADMIN — NORTRIPTYLINE HYDROCHLORIDE 50 MG: 50 CAPSULE ORAL at 21:31

## 2025-01-31 RX ADMIN — HUMAN ALBUMIN MICROSPHERES AND PERFLUTREN 3 ML: 10; .22 INJECTION, SOLUTION INTRAVENOUS at 11:54

## 2025-01-31 RX ADMIN — PANTOPRAZOLE SODIUM 40 MG: 40 TABLET, DELAYED RELEASE ORAL at 21:20

## 2025-01-31 RX ADMIN — ACETAMINOPHEN 650 MG: 325 TABLET, FILM COATED ORAL at 21:29

## 2025-01-31 RX ADMIN — IOPAMIDOL 61 ML: 755 INJECTION, SOLUTION INTRAVENOUS at 11:32

## 2025-01-31 RX ADMIN — SODIUM CHLORIDE 1000 ML: 9 INJECTION, SOLUTION INTRAVENOUS at 15:07

## 2025-01-31 RX ADMIN — OXYBUTYNIN CHLORIDE 5 MG: 5 TABLET ORAL at 21:31

## 2025-01-31 RX ADMIN — LOSARTAN POTASSIUM 50 MG: 50 TABLET, FILM COATED ORAL at 21:19

## 2025-01-31 RX ADMIN — SODIUM CHLORIDE 83 ML: 9 INJECTION, SOLUTION INTRAVENOUS at 11:32

## 2025-01-31 RX ADMIN — HEPARIN SODIUM 700 UNITS/HR: 10000 INJECTION, SOLUTION INTRAVENOUS at 10:00

## 2025-01-31 RX ADMIN — ISOSORBIDE MONONITRATE 30 MG: 30 TABLET, EXTENDED RELEASE ORAL at 15:03

## 2025-01-31 ASSESSMENT — ACTIVITIES OF DAILY LIVING (ADL)
ADLS_ACUITY_SCORE: 53
ADLS_ACUITY_SCORE: 65
ADLS_ACUITY_SCORE: 64
ADLS_ACUITY_SCORE: 56
ADLS_ACUITY_SCORE: 65
ADLS_ACUITY_SCORE: 53
ADLS_ACUITY_SCORE: 53
ADLS_ACUITY_SCORE: 56
ADLS_ACUITY_SCORE: 53
ADLS_ACUITY_SCORE: 53
ADLS_ACUITY_SCORE: 65
ADLS_ACUITY_SCORE: 65
ADLS_ACUITY_SCORE: 56
ADLS_ACUITY_SCORE: 53
ADLS_ACUITY_SCORE: 65

## 2025-01-31 NOTE — ED TRIAGE NOTES
"Patient ambulatory reporting \"heart issue\" since this AM. Patient unable to describe what is going on, but states she can feel it. Took nitroglycerin and aspirin PTA with no relief.         "

## 2025-01-31 NOTE — PROGRESS NOTES
Coronary angiogram complete. Pt tolerated well. Received 1 mg versed and 50mcg fentanyl IV. Site at right femoral artery with 4 Yoruba sheath, manual pressure applied. Site covered with gauze and transparent dressing. Pt to be on flat bedrest for 2 hours (until 1600).  Pt transferred to inpt room and report given to RN.

## 2025-01-31 NOTE — Clinical Note
The PT pulses are 2+ bilaterally. The radial pulses are 2+ bilaterally. The femoral pulses are 2+ bilaterally.

## 2025-01-31 NOTE — PROGRESS NOTES
PRIMARY DIAGNOSIS: CHEST PAIN  OUTPATIENT/OBSERVATION GOALS TO BE MET BEFORE DISCHARGE:  1. Ruled out acute coronary syndrome (negative or stable Troponin):  No  2. Pain Status: Pain free.  3. Appropriate provocative testing performed: Yes  - Stress Test Procedure: NA  - Interpretation of cardiac rhythm per telemetry tech: SR w/ occ PVCs    4. Cleared by Consultants (if applicable):No  5. Return to near baseline physical activity: No  Discharge Planner Nurse   Safe discharge environment identified: No  Barriers to discharge: Yes       Entered by: Carolina Esparza RN 01/31/2025 4:17 PM     Please review provider order for any additional goals.   Nurse to notify provider when observation goals have been met and patient is ready for discharge.

## 2025-01-31 NOTE — ED NOTES
"[facility]  ED Nurse Handoff Report    ED Chief complaint: Chest Pain  . ED Diagnosis:   Final diagnoses:   Chest discomfort   Unstable angina (H)       Allergies:   Allergies   Allergen Reactions    Atorvastatin Muscle Pain (Myalgia)    Cephalexin Headache    Escitalopram Muscle Pain (Myalgia)    Ibuprofen     Lisinopril Cough    Nsaids Nausea and GI Disturbance    Rosuvastatin Muscle Pain (Myalgia)    Tramadol Dizziness and Nausea and Vomiting       Code Status: Full Code    Activity level - Baseline/Home:  independent.  Activity Level - Current:   standby. Only due to heparin  Lift room needed: No.   Bariatric: No   Needed: No   Isolation: No.   Infection: Not Applicable.     Respiratory status: Room air    Vital Signs (within 30 minutes):   Vitals:    01/31/25 0930 01/31/25 0932 01/31/25 0933 01/31/25 0934   BP: 127/81      Pulse: 66 67 73 72   Resp:  11 15 18   Temp:       SpO2: 100% 100% 99% 100%   Weight:       Height:           Cardiac Rhythm:  ,      Pain level:    Patient confused: No.   Patient Falls Risk: patient and family education.   Elimination Status: Has voided     Patient Report - Initial Complaint: Chest pressure, denies pain currently.   Focused Assessment: Ania Castillo is a 74 year old female anticoagulated on Baby Asprin who presents to the ED for evaluation of chest pain. Patient reports that she woke up with an chest discomfort this morning. She states that she has not had this pain since 2017. She denies vomiting, diarrhea, shortness of breath, fever, cough, sore throat, and back pain. She endorses that she has chronic back pain.      1102  Cardiac  JJ    Cardiac (Adult)Cardiac WDL: .WDL except (Cardiac hx 2 \"Heart Attacks\" Uncertain if NSTEMI or STEMI hx. C/O chest \"Heaviness,\")      1102  Respiratory  JJ    RespiratoryAirway WDL: WDL  Respiratory WDLRespiratory WDL: WDL        Pt had 162 Aspirin PTA, 162 mg given in ED for full Aspirin dose.     Abnormal " Results:   Labs Ordered and Resulted from Time of ED Arrival to Time of ED Departure   TROPONIN T, HIGH SENSITIVITY - Abnormal       Result Value    Troponin T, High Sensitivity 16 (*)    BASIC METABOLIC PANEL - Abnormal    Sodium 140      Potassium 4.3      Chloride 103      Carbon Dioxide (CO2) 28      Anion Gap 9      Urea Nitrogen 13.8      Creatinine 0.78      GFR Estimate 79      Calcium 9.6      Glucose 119 (*)    D DIMER QUANTITATIVE - Abnormal    D-Dimer Quantitative 0.83 (*)    CBC WITH PLATELETS AND DIFFERENTIAL    WBC Count 6.0      RBC Count 4.14      Hemoglobin 12.2      Hematocrit 37.3      MCV 90      MCH 29.5      MCHC 32.7      RDW 12.7      Platelet Count 292      % Neutrophils 45      % Lymphocytes 41      % Monocytes 10      % Eosinophils 3      % Basophils 1      % Immature Granulocytes 0      NRBCs per 100 WBC 0      Absolute Neutrophils 2.7      Absolute Lymphocytes 2.4      Absolute Monocytes 0.6      Absolute Eosinophils 0.2      Absolute Basophils 0.0      Absolute Immature Granulocytes 0.0      Absolute NRBCs 0.0     TROPONIN T, HIGH SENSITIVITY   GLUCOSE MONITOR NURSING POCT   GLUCOSE MONITOR NURSING POCT   HEMOGLOBIN A1C   GLUCOSE MONITOR NURSING POCT        CT Chest Pulmonary Embolism w Contrast   Preliminary Result   IMPRESSION:   1.  No evidence for pulmonary embolism.   2.  Mild bibasilar atelectasis.            Echocardiogram Complete    (Results Pending)   Cardiac Catheterization    (Results Pending)       Treatments provided: heparin, Echocardiogram completed in ED, Cardiology MD Davila saw pt in ED.  Family Comments: family at bedside  OBS brochure/video discussed/provided to patient:  No  ED Medications:   Medications   heparin 25,000 units in 0.45% NaCl 250 mL ANTICOAGULANT infusion (700 Units/hr Intravenous $New Bag 1/31/25 1000)   lidocaine 1 % 0.1-1 mL (has no administration in time range)   lidocaine (LMX4) cream (has no administration in time range)   sodium chloride (PF)  0.9% PF flush 3 mL (3 mLs Intracatheter Not Given 1/31/25 1101)   sodium chloride (PF) 0.9% PF flush 3 mL (has no administration in time range)   sodium chloride (PF) 0.9% PF flush 3 mL (has no administration in time range)   sodium chloride 0.9 % infusion (has no administration in time range)   aspirin (ASA) tablet 325 mg (has no administration in time range)     Or   aspirin (ASA) chewable tablet 243 mg (has no administration in time range)   potassium chloride alicia ER (KLOR-CON M20) CR tablet 20 mEq (has no administration in time range)   nitroGLYcerin (NITROSTAT) sublingual tablet 0.4 mg (has no administration in time range)   glucose gel 15-30 g (has no administration in time range)     Or   dextrose 50 % injection 25-50 mL (has no administration in time range)     Or   glucagon injection 1 mg (has no administration in time range)   aspirin (ASA) chewable tablet 162 mg (162 mg Oral $Given 1/31/25 0923)   heparin loading dose for LOW INTENSITY TREATMENT * Give BEFORE starting heparin infusion (3,500 Units Intravenous $Given 1/31/25 0959)   sodium chloride 0.9% BOLUS 1,000 mL (1,000 mLs Intravenous $New Bag 1/31/25 1101)   CT scan flush (83 mLs Intravenous $Given 1/31/25 1132)   iopamidol (ISOVUE-370) solution 500 mL (61 mLs Intravenous $Given 1/31/25 1132)   sodium chloride (PF) 0.9% PF flush 10 mL (10 mLs Intravenous $Given 1/31/25 1155)   perflutren diluted 1mL to 2mL with saline (OPTISON) diluted injection 3 mL (3 mLs Intravenous $Given 1/31/25 1154)       Drips infusing:  Yes, heparin 700 units/hr  For the majority of the shift this patient was Green.   Interventions performed were n/a.    Sepsis treatment initiated: No    Cares/treatment/interventions/medications to be completed following ED care: none    ED Nurse Name: Bruna Wolfe RN  12:40 PM

## 2025-01-31 NOTE — PHARMACY-ADMISSION MEDICATION HISTORY
Pharmacy Intern Admission Medication History    Admission medication history is complete. The information provided in this note is only as accurate as the sources available at the time of the update.    Information Source(s): Patient and CareEverywhere/SureScripts via in-person    Pertinent Information: Patient reported that she took 2 tablets of Aspirin 81mg this morning along with 1 dose of Nitroglycerin.     Changes made to PTA medication list:  Added: Aspirin, Triamcinolone   Deleted: Artificial saliva, Flexeril, Oxycodone, hydroxyzine  Changed:   Biotin: 1 tab daily-> 4 tabs daily  Lidocaine patch: daily-> daily PRN   Omeprazole: 1 tab daily-> 1 tab BID   Oxybutynin: 1 tab TID-> 1 tab BID     Allergies reviewed with patient and updates made in EHR: yes    Medication History Completed By: Jennie Servin RPH 1/31/2025 10:48 AM    PTA Med List   Medication Sig Last Dose/Taking    acetaminophen (TYLENOL) 500 MG tablet Take 1,000 mg by mouth every 6 hours as needed Taking As Needed    aspirin 81 MG EC tablet Take 81 mg by mouth daily. 1/31/2025 Morning    biotin 1000 MCG TABS tablet Take 4 tablets by mouth daily. 1/30/2025 Evening    ezetimibe (ZETIA) 10 MG tablet Take 1 tablet (10 mg) by mouth daily 1/30/2025 Evening    fish oil-omega-3 fatty acids 1000 MG capsule Take 1 g by mouth daily. 1/30/2025 Evening    Lidocaine (LIDOCARE) 4 % Patch Place 1 patch onto the skin daily as needed for moderate pain. To prevent lidocaine toxicity, patient should be patch free for 12 hrs daily. Taking As Needed    losartan (COZAAR) 50 MG tablet Take 1 tablet (50 mg) by mouth daily Do not take this medication at your follow-up with your primary care doctor. 1/30/2025 Evening    metoprolol tartrate (LOPRESSOR) 25 MG tablet Take 25 mg by mouth 2 times daily 1/30/2025 Evening    nitroGLYcerin (NITROSTAT) 0.4 MG sublingual tablet Place 1 tablet (0.4 mg) under the tongue every 5 minutes as needed 1/31/2025 Morning    nortriptyline  (PAMELOR) 25 MG capsule Take 2 capsules by mouth at bedtime 1/30/2025 Evening    omeprazole (PRILOSEC OTC) 20 MG EC tablet Take 20 mg by mouth 2 times daily. 1/30/2025 Evening    oxyBUTYnin (DITROPAN) 5 MG tablet Take 5 mg by mouth 2 times daily. 1/30/2025 Evening    simvastatin (ZOCOR) 80 MG tablet Take 80 mg by mouth at bedtime 1/30/2025 Evening    tretinoin (RETIN-A) 0.1 % external cream Apply topically at bedtime to face as needed Taking    triamcinolone (KENALOG) 0.1 % external ointment Apply topically daily. Taking    Vitamin D, Cholecalciferol, 10 MCG (400 UNIT) TABS Take 3 tablets by mouth daily 1/30/2025 Evening

## 2025-01-31 NOTE — PLAN OF CARE
"Shift summary (time of admission - 1930)    Pt arrived from cath lab around 1415, oriented to room and staff. Maintained bedrest until after 1600 per orders. Right femoral site intact, no hemo, CMS intact to RLE. On RA. PRN PO tylenol given per pt request for reported neck pain from \"laying weird,\" heat applied; improved. Tele SR w/ occ PVCs, denies CP. Tolerating regular diet. Awaiting flyer RN assistance for PIV placement, IVF will then need to be restarted.     Goal Outcome Evaluation:      Plan of Care Reviewed With: patient    Overall Patient Progress: no changeOverall Patient Progress: no change    Outcome Evaluation: Admitted from cath lab from ED during 1400 hour. Denies CP. Right femoral site intact.    PRIMARY DIAGNOSIS: CHEST PAIN  OUTPATIENT/OBSERVATION GOALS TO BE MET BEFORE DISCHARGE:  1. Ruled out acute coronary syndrome (negative or stable Troponin):  Yes  2. Pain Status: Pain free.  3. Appropriate provocative testing performed: Yes  - Stress Test Procedure: angiogram  - Interpretation of cardiac rhythm per telemetry tech: SR w/ occ PVCs    4. Cleared by Consultants (if applicable):No  5. Return to near baseline physical activity: No  Discharge Planner Nurse   Safe discharge environment identified: Yes  Barriers to discharge: Yes       Entered by: Carolina Esparza RN 01/31/2025 6:37 PM     Please review provider order for any additional goals.   Nurse to notify provider when observation goals have been met and patient is ready for discharge.    Problem: Adult Inpatient Plan of Care  Goal: Plan of Care Review  Description: The Plan of Care Review/Shift note should be completed every shift.  The Outcome Evaluation is a brief statement about your assessment that the patient is improving, declining, or no change.  This information will be displayed automatically on your shift  note.  Outcome: Not Progressing  Flowsheets (Taken 1/31/2025 0004)  Outcome Evaluation: Admitted from cath lab from ED " "during 1400 hour. Denies CP. Right femoral site intact.  Plan of Care Reviewed With: patient  Overall Patient Progress: no change  Goal: Patient-Specific Goal (Individualized)  Description: You can add care plan individualizations to a care plan. Examples of Individualization might be:  \"Parent requests to be called daily at 9am for status\", \"I have a hard time hearing out of my right ear\", or \"Do not touch me to wake me up as it startles  me\".  Outcome: Not Progressing  Goal: Absence of Hospital-Acquired Illness or Injury  Outcome: Not Progressing  Intervention: Identify and Manage Fall Risk  Recent Flowsheet Documentation  Taken 1/31/2025 1421 by Carolina Esparza RN  Safety Promotion/Fall Prevention:   activity supervised   assistive device/personal items within reach   clutter free environment maintained   increased rounding and observation   increase visualization of patient   lighting adjusted   mobility aid in reach   nonskid shoes/slippers when out of bed   patient and family education   room door open   room near nurse's station   room organization consistent   safety round/check completed   supervised activity   treat reversible contributory factors   treat underlying cause  Intervention: Prevent Skin Injury  Recent Flowsheet Documentation  Taken 1/31/2025 1714 by Carolina Esparza RN  Body Position:   weight shifting   supine, head elevated  Taken 1/31/2025 1421 by Carolina Esparza RN  Body Position:   position changed independently   supine  Skin Protection:   adhesive use limited   tubing/devices free from skin contact  Intervention: Prevent and Manage VTE (Venous Thromboembolism) Risk  Recent Flowsheet Documentation  Taken 1/31/2025 1421 by Carolina Esparza RN  VTE Prevention/Management: SCDs off (sequential compression devices)  Intervention: Prevent Infection  Recent Flowsheet Documentation  Taken 1/31/2025 1421 by Carolina Esparza RN  Infection Prevention:   equipment " surfaces disinfected   personal protective equipment utilized   hand hygiene promoted   rest/sleep promoted   single patient room provided  Goal: Optimal Comfort and Wellbeing  Outcome: Not Progressing  Intervention: Monitor Pain and Promote Comfort  Recent Flowsheet Documentation  Taken 1/31/2025 1416 by Carolina Esparza, RN  Pain Management Interventions: (towel under neck for support)   rest   medication (see MAR)  Goal: Readiness for Transition of Care  Outcome: Not Progressing

## 2025-01-31 NOTE — ED PROVIDER NOTES
Emergency Department Note      History of Present Illness     Chief Complaint   Chest Pain      NILDA Castillo is a 74 year old female anticoagulated on Baby Asprin with history of hypertension, coronary artery disease, who presents to the ED for evaluation of chest pain. Patient reports that she woke up with an chest discomfort this morning. She states that she has not had this pain since 2017. She denies vomiting, diarrhea, shortness of breath, fever, cough, sore throat, and back pain. She endorses that she has chronic back pain.     Independent Historian   None    Review of External Notes   I reviewed a cardiology visit note from Dr. Mario, 5/21/24 looks like she has a history anteroapical microinfarction.  in 2007 had LAD angioplasty.  Last cath was September of 2017, apex to the heart had damage from a prior infraction.     Past Medical History     Medical History and Problem List   Anxiety  Hyperlipidemia  Nonulcer dyspepsia  Osteoarthrosis  Osteopenia  Prediabetes  Thickened endometrium  Vitamin D deficiency  Chronic gastric erosion  Coronary artery disease involving native coronary artery   Hypertension   Hiatal hernia  Chest discomfort  NSTEMI  Hip arthroplasty   Myocardial infarction  Hypercholesterolemia  Type 2 diabetes  Unstable angina    Medications   Artificial saliva  Biotin   Ditropan   Roxicodone  Zocor  Retin-a  Zetia  Atarax  Lidocare  Cozaar  Lopressor  Nitrostat  Pamelor   Metoprolol succinate  Prilosec   Pravastatin  Flexeril  Ferrous sulfate   Methylpred  Zofran  Sanctura    Surgical History   ACL reconstruction   Bladder suspension  OK repair detach retina scleral buckle  Vitrectomy   CVL heart cath right   Arthroplasty hip  Knee surgery    Physical Exam     Patient Vitals for the past 24 hrs:   BP Temp Temp src Pulse Resp SpO2 Height Weight   01/31/25 1429 122/76 97.8  F (36.6  C) Oral -- 16 97 % -- --   01/31/25 1416 136/79 98.1  F (36.7  C) Oral -- 16 96 % -- --   01/31/25 1353 -- --  "-- -- 16 -- -- --   01/31/25 1342 -- -- -- -- 16 -- -- --   01/31/25 1338 -- -- -- -- 16 -- -- --   01/31/25 1332 -- -- -- -- 16 -- -- --   01/31/25 1329 -- -- -- -- 16 -- -- --   01/31/25 1324 -- -- -- -- 16 -- -- --   01/31/25 1322 -- -- -- -- 16 -- -- --   01/31/25 1251 -- -- -- -- 16 -- -- --   01/31/25 1250 -- -- -- 70 19 97 % -- --   01/31/25 1249 -- -- -- 67 16 97 % -- --   01/31/25 1248 -- -- -- 76 17 97 % -- --   01/31/25 1247 -- -- -- 71 12 98 % -- --   01/31/25 1245 (!) 147/105 -- -- 75 17 97 % -- --   01/31/25 1243 -- -- -- 72 20 97 % -- --   01/31/25 1241 -- -- -- 70 25 96 % -- --   01/31/25 1240 -- -- -- 72 10 97 % -- --   01/31/25 1239 -- -- -- 75 15 98 % -- --   01/31/25 1238 -- -- -- 68 19 96 % -- --   01/31/25 1236 -- -- -- 65 18 97 % -- --   01/31/25 1235 -- -- -- 70 13 98 % -- --   01/31/25 0934 -- -- -- 72 18 100 % -- --   01/31/25 0933 -- -- -- 73 15 99 % -- --   01/31/25 0932 -- -- -- 67 11 100 % -- --   01/31/25 0930 127/81 -- -- 66 -- 100 % -- --   01/31/25 0928 -- -- -- 70 10 99 % -- --   01/31/25 0927 -- -- -- 66 12 99 % -- --   01/31/25 0925 -- -- -- 78 15 99 % -- --   01/31/25 0924 -- -- -- 67 18 99 % -- --   01/31/25 0922 -- -- -- 65 16 99 % -- --   01/31/25 0913 131/75 -- -- 66 14 100 % -- --   01/31/25 0849 102/81 97.4  F (36.3  C) -- 80 16 98 % 1.626 m (5' 4\") 58.3 kg (128 lb 8.5 oz)     Physical Exam  General: Alert, appears well-developed and well-nourished. Cooperative.     In mild distress  HEENT:  Head:  Atraumatic  Ears:  External ears are normal  Mouth/Throat:  Oropharynx is without erythema or exudate and mucous membranes are moist.   Eyes:   Conjunctivae normal and EOM are normal. No scleral icterus.  CV:  Normal rate, regular rhythm, normal heart sounds and radial pulses are 2+ and symmetric.  No murmur.  Resp:  Breath sounds are clear bilaterally    Non-labored, no retractions or accessory muscle use  GI:  Abdomen is soft, no distension, no tenderness. No rebound or " guarding.  No CVA tenderness bilaterally  MS:  Normal range of motion. No edema.    Normal strength in all 4 extremities.     Back atraumatic.    No midline cervical, thoracic, or lumbar tenderness  Skin:  Warm and dry.  No rash or lesions noted.  Neuro:   Alert. Normal strength.  GCS: 15  Psych: Normal mood and affect.    Diagnostics     Lab Results   Labs Ordered and Resulted from Time of ED Arrival to Time of ED Departure   TROPONIN T, HIGH SENSITIVITY - Abnormal       Result Value    Troponin T, High Sensitivity 16 (*)    BASIC METABOLIC PANEL - Abnormal    Sodium 140      Potassium 4.3      Chloride 103      Carbon Dioxide (CO2) 28      Anion Gap 9      Urea Nitrogen 13.8      Creatinine 0.78      GFR Estimate 79      Calcium 9.6      Glucose 119 (*)    D DIMER QUANTITATIVE - Abnormal    D-Dimer Quantitative 0.83 (*)    CBC WITH PLATELETS AND DIFFERENTIAL    WBC Count 6.0      RBC Count 4.14      Hemoglobin 12.2      Hematocrit 37.3      MCV 90      MCH 29.5      MCHC 32.7      RDW 12.7      Platelet Count 292      % Neutrophils 45      % Lymphocytes 41      % Monocytes 10      % Eosinophils 3      % Basophils 1      % Immature Granulocytes 0      NRBCs per 100 WBC 0      Absolute Neutrophils 2.7      Absolute Lymphocytes 2.4      Absolute Monocytes 0.6      Absolute Eosinophils 0.2      Absolute Basophils 0.0      Absolute Immature Granulocytes 0.0      Absolute NRBCs 0.0     GLUCOSE MONITOR NURSING POCT   GLUCOSE MONITOR NURSING POCT       Imaging   Cardiac Catheterization   Final Result      Echocardiogram Complete   Final Result      CT Chest Pulmonary Embolism w Contrast   Preliminary Result   IMPRESSION:   1.  No evidence for pulmonary embolism.   2.  Mild bibasilar atelectasis.                EKG   ECG taken at 0854, ECG read at 0920  Normal sinus rhythm  Cannot rule out inferior infact,   Age determined   Anterior infract, age undetermined  T wave abnormaility, consider lateral   Ischemia  Abnormal ECG    Concerning, although seen partially on old EKG's as compared to prior, dated 3/13/24.  Rate 67 bpm. WI interval 160 ms. QRS duration 80 ms. QT/QTc 386/407 ms. P-R-T axes 57 44 95.    EKG   ECG taken at 0907, ECG read at 0921  Normal sinus rhythm   Possible inferior infract, age  Undetermined  Anterior infract, age undetermined   Abnormal ECG   No change as compared to prior, dated 1/31/25.  Rate 68 bpm. WI interval 158 ms. QRS duration 80 ms. QT/QTc 390/414 ms. P-R-T axes 60 25 79.    Independent Interpretation   None    ED Course      Medications Administered   Medications   simvastatin (ZOCOR) tablet 80 mg (has no administration in time range)   nortriptyline (PAMELOR) capsule 50 mg (has no administration in time range)   omeprazole (PriLOSEC OTC) EC tablet 20 mg (has no administration in time range)   oxyBUTYnin (DITROPAN) tablet 5 mg (has no administration in time range)   metoprolol tartrate (LOPRESSOR) tablet 25 mg (has no administration in time range)   losartan (COZAAR) tablet 50 mg (has no administration in time range)   ezetimibe (ZETIA) tablet 10 mg (has no administration in time range)   aspirin EC tablet 81 mg (has no administration in time range)   acetaminophen (TYLENOL) tablet 650 mg (has no administration in time range)     Or   acetaminophen (TYLENOL) Suppository 650 mg (has no administration in time range)   senna-docusate (SENOKOT-S/PERICOLACE) 8.6-50 MG per tablet 1 tablet (has no administration in time range)     Or   senna-docusate (SENOKOT-S/PERICOLACE) 8.6-50 MG per tablet 2 tablet (has no administration in time range)   polyethylene glycol (MIRALAX) Packet 17 g (has no administration in time range)   ondansetron (ZOFRAN ODT) ODT tab 4 mg (has no administration in time range)     Or   ondansetron (ZOFRAN) injection 4 mg (has no administration in time range)   prochlorperazine (COMPAZINE) injection 5 mg (has no administration in time range)     Or   prochlorperazine (COMPAZINE) tablet 5 mg  (has no administration in time range)   Patient is already receiving anticoagulation with heparin, enoxaparin (LOVENOX), warfarin (COUMADIN)  or other anticoagulant medication (has no administration in time range)   glucose gel 15-30 g (has no administration in time range)     Or   dextrose 50 % injection 25-50 mL (has no administration in time range)     Or   glucagon injection 1 mg (has no administration in time range)   sodium chloride 0.9% BOLUS 250 mL (has no administration in time range)   atropine injection 0.5 mg (has no administration in time range)   fentaNYL (PF) (SUBLIMAZE) injection 25 mcg (has no administration in time range)   midazolam (VERSED) injection 0.5 mg (has no administration in time range)   naloxone (NARCAN) injection 0.2 mg (has no administration in time range)     Or   naloxone (NARCAN) injection 0.4 mg (has no administration in time range)     Or   naloxone (NARCAN) injection 0.2 mg (has no administration in time range)     Or   naloxone (NARCAN) injection 0.4 mg (has no administration in time range)   flumazenil (ROMAZICON) injection 0.2 mg (has no administration in time range)   HOLD:  Metformin and metformin containing medications if patient received IV contrast with acute kidney injury or severe chronic kidney disease (stage IV or stage V; i.e., eGFR less than 30) (has no administration in time range)   acetaminophen (TYLENOL) tablet 650 mg (has no administration in time range)   isosorbide mononitrate (IMDUR) 24 hr tablet 30 mg (has no administration in time range)   sodium chloride 0.9% BOLUS 1,000 mL (has no administration in time range)   aspirin (ASA) chewable tablet 162 mg (162 mg Oral $Given 1/31/25 0934)   heparin loading dose for LOW INTENSITY TREATMENT * Give BEFORE starting heparin infusion (3,500 Units Intravenous $Given 1/31/25 0959)   aspirin (ASA) tablet 325 mg (325 mg Oral Not Given 1/31/25 3733)     Or   aspirin (ASA) chewable tablet 243 mg ( Oral See Alternative  1/31/25 1243)   sodium chloride 0.9% BOLUS 1,000 mL (0 mLs Intravenous Stopped 1/31/25 1244)   CT scan flush (83 mLs Intravenous $Given 1/31/25 1132)   iopamidol (ISOVUE-370) solution 500 mL (61 mLs Intravenous $Given 1/31/25 1132)   sodium chloride (PF) 0.9% PF flush 10 mL (10 mLs Intravenous $Given 1/31/25 1155)   perflutren diluted 1mL to 2mL with saline (OPTISON) diluted injection 3 mL (3 mLs Intravenous $Given 1/31/25 1154)       Procedures   Procedures     Discussion of Management   Admitting Hospitalist, Dr. Chin  Cardiology, Dr. Davila    ED Course   ED Course as of 01/31/25 1134   Fri Jan 31, 2025   0916 I obtained history and examined the patient as noted above.    0930 I spoke with Dr. Davila of cardiology regarding the patient's history and presentation today.    1044 I consulted with Dr. Chin of the hospitalist service and discussed patient admission. They accepted care of the patient.          Additional Documentation  None    Medical Decision Making / Diagnosis     CMS Diagnoses: None    MIPS     CT for PE was ordered because the patient had an abnormal d-dimer.    EVANGELISTA Castillo is a 74 year old female with a past medical history of CAD with prior anterior/apical wall myocardial infarction, type 2 diabetes, hypertension, hyperlipidemia, GERD who presents with left-sided chest discomfort that began in the middle of the night yesterday evening.  She did take 2 baby aspirin and also nitroglycerin at home with little improvement in her symptoms.  Her EKG is very abnormal with concerning ST segment changes in V3 through V4 as well as biphasic T waves.  Although this is an abnormality detected on her EKG today she has had similar appearance on historic EKGs from prior years.  Given the concerning chest discomfort as well as her abnormal EKG I did speak with Dr. Davila with cardiology today.  He assessed the patient at bedside in the emergency department and please see his separate consultation note.  Ultimately  given the patient's history of coronary artery disease as well as multiple risk factors for heart disease we will plan for admission for further cardiology consultation and investigation.  He did recommend coronary catheterization potentially later today.  Given concern her symptoms may represent unstable angina will initiate heparin at this time.  Patient did have elevated D-dimer and so we followed with a CT scan of the chest which thankfully shows no evidence of pulmonary embolism.  Patient's initial troponin study returned at 16.  Serial troponin studies are pending at time of admission.  I spoke with Dr. Prem Chin of the hospitalist service who agreed to admission for further cardiology consultation and investigation.    Critical Care time was 40 minutes for this patient excluding procedures.    Disposition   The patient was admitted to the hospital.     Diagnosis     ICD-10-CM    1. NSTEMI (non-ST elevated myocardial infarction) (H)  I21.4 Case Request Cath Lab: Coronary Angiogram, Percutaneous Coronary Intervention     Case Request Cath Lab: Coronary Angiogram, Percutaneous Coronary Intervention     Cardiac Catheterization     Cardiac Catheterization     sodium chloride 0.9% BOLUS 250 mL     atropine injection 0.5 mg     fentaNYL (PF) (SUBLIMAZE) injection 25 mcg     midazolam (VERSED) injection 0.5 mg     naloxone (NARCAN) injection 0.2 mg     naloxone (NARCAN) injection 0.4 mg     naloxone (NARCAN) injection 0.2 mg     naloxone (NARCAN) injection 0.4 mg     flumazenil (ROMAZICON) injection 0.2 mg     HOLD:  Metformin and metformin containing medications if patient received IV contrast with acute kidney injury or severe chronic kidney disease (stage IV or stage V; i.e., eGFR less than 30)     acetaminophen (TYLENOL) tablet 650 mg     DISCONTINUED: sodium chloride 0.9 % infusion      2. Chest discomfort  R07.89       3. Unstable angina (H)  I20.0            Discharge Medications   Current Discharge Medication  List            Scribe Disclosure:  I, Emani Huddleston, am serving as a scribe at 1:52 PM on 1/31/2025 to document services personally performed by Dr. Antione Irizarry MD, based on my observations and the provider's statements to me.        Antione Irizarry MD  01/31/25 1225

## 2025-01-31 NOTE — PRE-PROCEDURE
GENERAL PRE-PROCEDURE:   Procedure:  Coronary angiogram, possible PCI  Date/Time:  1/31/2025 1:19 PM    Written consent obtained?: Yes    Risks and benefits: Risks, benefits and alternatives were discussed    Consent given by:  Patient  Patient states understanding of procedure being performed: Yes    Patient's understanding of procedure matches consent: Yes    Procedure consent matches procedure scheduled: Yes    Expected level of sedation:  Moderate  Appropriately NPO:  Yes  ASA Class:  4  Mallampati  :  Grade 2- soft palate, base of uvula, tonsillar pillars, and portion of posterior pharyngeal wall visible  Lungs:  Lungs clear with good breath sounds bilaterally  Heart:  Normal heart sounds and rate  History & Physical reviewed:  History and physical reviewed and no updates needed  Statement of review:  I have reviewed the lab findings, diagnostic data, medications, and the plan for sedation
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
## 2025-02-01 ENCOUNTER — APPOINTMENT (OUTPATIENT)
Dept: CT IMAGING | Facility: CLINIC | Age: 75
End: 2025-02-01
Attending: INTERNAL MEDICINE
Payer: MEDICARE

## 2025-02-01 VITALS
DIASTOLIC BLOOD PRESSURE: 69 MMHG | BODY MASS INDEX: 21.94 KG/M2 | OXYGEN SATURATION: 99 % | HEIGHT: 64 IN | WEIGHT: 128.53 LBS | HEART RATE: 75 BPM | RESPIRATION RATE: 18 BRPM | TEMPERATURE: 98 F | SYSTOLIC BLOOD PRESSURE: 122 MMHG

## 2025-02-01 LAB
GLUCOSE BLDC GLUCOMTR-MCNC: 81 MG/DL (ref 70–99)
GLUCOSE BLDC GLUCOMTR-MCNC: 90 MG/DL (ref 70–99)

## 2025-02-01 PROCEDURE — 70450 CT HEAD/BRAIN W/O DYE: CPT

## 2025-02-01 PROCEDURE — 250N000011 HC RX IP 250 OP 636: Performed by: HOSPITALIST

## 2025-02-01 PROCEDURE — G0378 HOSPITAL OBSERVATION PER HR: HCPCS

## 2025-02-01 PROCEDURE — 250N000013 HC RX MED GY IP 250 OP 250 PS 637: Performed by: INTERNAL MEDICINE

## 2025-02-01 PROCEDURE — 82962 GLUCOSE BLOOD TEST: CPT

## 2025-02-01 PROCEDURE — 99239 HOSP IP/OBS DSCHRG MGMT >30: CPT | Performed by: HOSPITALIST

## 2025-02-01 PROCEDURE — 250N000013 HC RX MED GY IP 250 OP 250 PS 637: Performed by: HOSPITALIST

## 2025-02-01 RX ORDER — AMLODIPINE BESYLATE 2.5 MG/1
2.5 TABLET ORAL EVERY EVENING
Qty: 30 TABLET | Refills: 1 | Status: SHIPPED | OUTPATIENT
Start: 2025-02-01

## 2025-02-01 RX ORDER — OXYCODONE HYDROCHLORIDE 5 MG/1
5 TABLET ORAL EVERY 4 HOURS PRN
Status: DISCONTINUED | OUTPATIENT
Start: 2025-02-01 | End: 2025-02-01 | Stop reason: HOSPADM

## 2025-02-01 RX ORDER — NALOXONE HYDROCHLORIDE 0.4 MG/ML
0.2 INJECTION, SOLUTION INTRAMUSCULAR; INTRAVENOUS; SUBCUTANEOUS
Status: DISCONTINUED | OUTPATIENT
Start: 2025-02-01 | End: 2025-02-01 | Stop reason: HOSPADM

## 2025-02-01 RX ORDER — NALOXONE HYDROCHLORIDE 0.4 MG/ML
0.4 INJECTION, SOLUTION INTRAMUSCULAR; INTRAVENOUS; SUBCUTANEOUS
Status: DISCONTINUED | OUTPATIENT
Start: 2025-02-01 | End: 2025-02-01 | Stop reason: HOSPADM

## 2025-02-01 RX ORDER — ISOSORBIDE MONONITRATE 30 MG/1
30 TABLET, EXTENDED RELEASE ORAL DAILY
Qty: 30 TABLET | Refills: 0 | Status: SHIPPED | OUTPATIENT
Start: 2025-02-01

## 2025-02-01 RX ADMIN — ACETAMINOPHEN 650 MG: 325 TABLET, FILM COATED ORAL at 05:04

## 2025-02-01 RX ADMIN — ACETAMINOPHEN 650 MG: 325 TABLET, FILM COATED ORAL at 11:13

## 2025-02-01 RX ADMIN — OXYBUTYNIN CHLORIDE 5 MG: 5 TABLET ORAL at 11:12

## 2025-02-01 RX ADMIN — ISOSORBIDE MONONITRATE 30 MG: 30 TABLET, EXTENDED RELEASE ORAL at 11:13

## 2025-02-01 RX ADMIN — ACETAMINOPHEN 650 MG: 325 TABLET, FILM COATED ORAL at 01:07

## 2025-02-01 RX ADMIN — PANTOPRAZOLE SODIUM 40 MG: 40 TABLET, DELAYED RELEASE ORAL at 11:13

## 2025-02-01 RX ADMIN — ASPIRIN 81 MG: 81 TABLET, COATED ORAL at 11:13

## 2025-02-01 RX ADMIN — ONDANSETRON 4 MG: 2 INJECTION INTRAMUSCULAR; INTRAVENOUS at 10:01

## 2025-02-01 ASSESSMENT — ACTIVITIES OF DAILY LIVING (ADL)
ADLS_ACUITY_SCORE: 57
ADLS_ACUITY_SCORE: 64
ADLS_ACUITY_SCORE: 57
ADLS_ACUITY_SCORE: 58
ADLS_ACUITY_SCORE: 57
ADLS_ACUITY_SCORE: 58
ADLS_ACUITY_SCORE: 57
ADLS_ACUITY_SCORE: 58
ADLS_ACUITY_SCORE: 57

## 2025-02-01 NOTE — PLAN OF CARE
"[id number]  VSS on RA. SBA. Denies cp. C/o pain in the neck and headache, heat and ice applied, tylenol given with little improvement. Provider paged, CT head ordered, see result. Femoral site intact, no bleeding noted. Tele, SR.     Goal Outcome Evaluation:      Plan of Care Reviewed With: patient    Overall Patient Progress: no change    Outcome Evaluation: VSS. denies cp      Problem: Adult Inpatient Plan of Care  Goal: Plan of Care Review  Description: The Plan of Care Review/Shift note should be completed every shift.  The Outcome Evaluation is a brief statement about your assessment that the patient is improving, declining, or no change.  This information will be displayed automatically on your shift  note.  Outcome: Progressing  Flowsheets (Taken 2/1/2025 4740)  Outcome Evaluation: VSS. denies cp  Plan of Care Reviewed With: patient  Overall Patient Progress: no change  Goal: Patient-Specific Goal (Individualized)  Description: You can add care plan individualizations to a care plan. Examples of Individualization might be:  \"Parent requests to be called daily at 9am for status\", \"I have a hard time hearing out of my right ear\", or \"Do not touch me to wake me up as it startles  me\".  Outcome: Progressing  Goal: Absence of Hospital-Acquired Illness or Injury  Outcome: Progressing  Intervention: Identify and Manage Fall Risk  Recent Flowsheet Documentation  Taken 1/31/2025 2138 by Cindy Koehler RN  Safety Promotion/Fall Prevention:   safety round/check completed   supervised activity   room near nurse's station   patient and family education  Intervention: Prevent Skin Injury  Recent Flowsheet Documentation  Taken 1/31/2025 2138 by Cindy Koehler RN  Body Position: position changed independently  Goal: Optimal Comfort and Wellbeing  Outcome: Progressing  Intervention: Monitor Pain and Promote Comfort  Recent Flowsheet Documentation  Taken 1/31/2025 2129 by Cindy Koehler RN  Pain Management " Interventions:   medication (see MAR)   heat applied   rest  Goal: Readiness for Transition of Care  Outcome: Progressing     Problem: Skin Injury Risk Increased  Goal: Skin Health and Integrity  Outcome: Progressing  Intervention: Optimize Skin Protection  Recent Flowsheet Documentation  Taken 1/31/2025 2138 by Cindy Koehler, RN  Activity Management: activity adjusted per tolerance  Head of Bed (HOB) Positioning: HOB at 20-30 degrees     Problem: Chest Pain  Goal: Resolution of Chest Pain Symptoms  Outcome: Progressing

## 2025-02-01 NOTE — PROGRESS NOTES
"  Cross cover note:    Notified that the patient is having a severe headache that has not improved after Tylenol.  She states she has had a severe headache since she came to the emergency department and it has gotten progressively worse.  No nausea.  She states she feels like her head is \"going to split open.\"    She does not normally have headaches.  This is completely new for her.    She was on heparin which has now been discontinued.    As such  will obtain CT head.  "

## 2025-02-01 NOTE — PROGRESS NOTES
PRIMARY DIAGNOSIS: CHEST PAIN  OUTPATIENT/OBSERVATION GOALS TO BE MET BEFORE DISCHARGE:  1. Ruled out acute coronary syndrome (negative or stable Troponin):  Yes  2. Pain Status: C/o neck pain and headache, tylenol given with minimal improvement  3. Appropriate provocative testing performed: Yes  - Stress Test Procedure: Angiogram  - Interpretation of cardiac rhythm per telemetry tech: SR    4. Cleared by Consultants (if applicable):No  5. Return to near baseline physical activity: No  Discharge Planner Nurse   Safe discharge environment identified: Yes  Barriers to discharge: Yes       Entered by: Cindy Koehler RN 02/01/2025 5:09 AM     Please review provider order for any additional goals.   Nurse to notify provider when observation goals have been met and patient is ready for discharge.

## 2025-02-01 NOTE — PLAN OF CARE
"Shift summary (0700-time of discharge)    Pt Ox4. VSS on RA. PRN PO tylenol given per pt request for reported head and neck ache, MD aware. Tele SR, denies CP. Right femoral site dsg CDI, CMS intact, no hemo. Up SBA. Adequate for discharge home today.     Goal Outcome Evaluation:      Plan of Care Reviewed With: patient    Overall Patient Progress: improvingOverall Patient Progress: improving    Outcome Evaluation: Denies CP. Adequate for discharge home today.      Problem: Adult Inpatient Plan of Care  Goal: Plan of Care Review  Description: The Plan of Care Review/Shift note should be completed every shift.  The Outcome Evaluation is a brief statement about your assessment that the patient is improving, declining, or no change.  This information will be displayed automatically on your shift  note.  Outcome: Met  Flowsheets (Taken 2/1/2025 1251)  Outcome Evaluation: Denies CP. Adequate for discharge home today.  Plan of Care Reviewed With: patient  Overall Patient Progress: improving  Goal: Patient-Specific Goal (Individualized)  Description: You can add care plan individualizations to a care plan. Examples of Individualization might be:  \"Parent requests to be called daily at 9am for status\", \"I have a hard time hearing out of my right ear\", or \"Do not touch me to wake me up as it startles  me\".  Outcome: Met  Goal: Absence of Hospital-Acquired Illness or Injury  Outcome: Met  Intervention: Identify and Manage Fall Risk  Recent Flowsheet Documentation  Taken 2/1/2025 1257 by Carolina Esparza RN  Safety Promotion/Fall Prevention: safety round/check completed  Taken 2/1/2025 1223 by Carolina Esparza RN  Safety Promotion/Fall Prevention: safety round/check completed  Taken 2/1/2025 0952 by Carolina Esparza RN  Safety Promotion/Fall Prevention: (MD at bedside) safety round/check completed  Taken 2/1/2025 0833 by Carolina Esparza RN  Safety Promotion/Fall Prevention:   activity supervised   " assistive device/personal items within reach   clutter free environment maintained   increased rounding and observation   increase visualization of patient   lighting adjusted   mobility aid in reach   nonskid shoes/slippers when out of bed   patient and family education   room door open   room near nurse's station   room organization consistent   safety round/check completed   supervised activity   treat reversible contributory factors   treat underlying cause  Taken 2/1/2025 0725 by Carolina Esparza RN  Safety Promotion/Fall Prevention: safety round/check completed  Intervention: Prevent Skin Injury  Recent Flowsheet Documentation  Taken 2/1/2025 0833 by Carolina Esparza RN  Body Position:   position changed independently   supine, head elevated  Skin Protection:   adhesive use limited   incontinence pads utilized   tubing/devices free from skin contact  Taken 2/1/2025 0725 by Carolina Esparza RN  Body Position:   position changed independently   side-lying   left  Intervention: Prevent and Manage VTE (Venous Thromboembolism) Risk  Recent Flowsheet Documentation  Taken 2/1/2025 0833 by Carolina Esparza RN  VTE Prevention/Management: SCDs off (sequential compression devices)  Intervention: Prevent Infection  Recent Flowsheet Documentation  Taken 2/1/2025 0833 by Carolina Esparza RN  Infection Prevention:   equipment surfaces disinfected   hand hygiene promoted   personal protective equipment utilized   rest/sleep promoted   single patient room provided  Goal: Optimal Comfort and Wellbeing  Outcome: Met  Intervention: Monitor Pain and Promote Comfort  Recent Flowsheet Documentation  Taken 2/1/2025 1112 by Carolina Esparza RN  Pain Management Interventions: medication (see MAR)  Taken 2/1/2025 0834 by Carolina Esparza RN  Pain Management Interventions: rest  Goal: Readiness for Transition of Care  Outcome: Met

## 2025-02-01 NOTE — DISCHARGE SUMMARY
Hospitalist Discharge Summary  [facility]    Ania Castillo MRN# [id number]   YOB: 1950 Age: 74 year old     Date of Admission:  1/31/2025  Date of Discharge:  2/1/2025  Admitting Physician:  Prem Chin DO  Discharge Physician:  Prem Chin DO  Discharging Service:  Hospitalist     Primary Provider: Deepak Flores          Discharge Diagnosis:     Coronary vasospasm  Atypical chest pain  Demand ischemia  Mild cardiomyopathy  Headache  Hypertension  Hyperlipidemia  GERD             Discharge Disposition:     Discharged to home           Allergies:     Allergies   Allergen Reactions    Atorvastatin Muscle Pain (Myalgia)    Cephalexin Headache    Escitalopram Muscle Pain (Myalgia)    Ibuprofen     Lisinopril Cough    Nsaids Nausea and GI Disturbance    Rosuvastatin Muscle Pain (Myalgia)    Tramadol Dizziness and Nausea and Vomiting              Discharge Medications:     Current Discharge Medication List        START taking these medications    Details   amLODIPine (NORVASC) 2.5 MG tablet Take 1 tablet (2.5 mg) by mouth every evening.  Qty: 30 tablet, Refills: 1    Associated Diagnoses: NSTEMI (non-ST elevated myocardial infarction) (H)      isosorbide mononitrate (IMDUR) 30 MG 24 hr tablet Take 1 tablet (30 mg) by mouth daily.  Qty: 30 tablet, Refills: 0    Associated Diagnoses: NSTEMI (non-ST elevated myocardial infarction) (H)           CONTINUE these medications which have NOT CHANGED    Details   acetaminophen (TYLENOL) 500 MG tablet Take 1,000 mg by mouth every 6 hours as needed      aspirin 81 MG EC tablet Take 81 mg by mouth daily.      biotin 1000 MCG TABS tablet Take 4 tablets by mouth daily.      ezetimibe (ZETIA) 10 MG tablet Take 1 tablet (10 mg) by mouth daily  Qty: 90 tablet, Refills: 3    Associated Diagnoses: Coronary artery disease involving native coronary artery of native heart without angina pectoris      fish oil-omega-3 fatty acids 1000 MG  "capsule Take 1 g by mouth daily.      Lidocaine (LIDOCARE) 4 % Patch Place 1 patch onto the skin daily as needed for moderate pain. To prevent lidocaine toxicity, patient should be patch free for 12 hrs daily.      losartan (COZAAR) 50 MG tablet Take 1 tablet (50 mg) by mouth daily Do not take this medication at your follow-up with your primary care doctor.  Qty: 90 tablet, Refills: 3    Associated Diagnoses: Coronary artery disease involving native coronary artery of native heart without angina pectoris      nitroGLYcerin (NITROSTAT) 0.4 MG sublingual tablet Place 1 tablet (0.4 mg) under the tongue every 5 minutes as needed  Qty: 25 tablet, Refills: 3    Associated Diagnoses: Coronary artery disease involving native coronary artery of native heart without angina pectoris      nortriptyline (PAMELOR) 25 MG capsule Take 2 capsules by mouth at bedtime      omeprazole (PRILOSEC OTC) 20 MG EC tablet Take 20 mg by mouth 2 times daily.      oxyBUTYnin (DITROPAN) 5 MG tablet Take 5 mg by mouth 2 times daily.      simvastatin (ZOCOR) 80 MG tablet Take 80 mg by mouth at bedtime      tretinoin (RETIN-A) 0.1 % external cream Apply topically at bedtime to face as needed      triamcinolone (KENALOG) 0.1 % external ointment Apply topically daily.      Vitamin D, Cholecalciferol, 10 MCG (400 UNIT) TABS Take 3 tablets by mouth daily           STOP taking these medications       metoprolol tartrate (LOPRESSOR) 25 MG tablet Comments:   Reason for Stopping:                      Condition on Discharge:     Discharge condition: Stable   Discharge vitals: Blood pressure (!) 126/93, pulse 69, temperature 97.7  F (36.5  C), temperature source Oral, resp. rate 18, height 1.626 m (5' 4\"), weight 58.3 kg (128 lb 8.5 oz), SpO2 93%.   Code status on discharge: Full Code      BASIC PHYSICAL EXAMINATION:  GENERAL: No apparent distress.  CARDIOVASCULAR: Regular rate and rhythm without murmurs.  PULMONARY: Clear to auscultation bilaterally. "   GASTROINTESTINAL: Abdomen soft, non-tender.  EXTREMITIES: No edema, pulses intact.  NEUROLOGIC: No focal deficits.            History of Illness:   See detailed admission note for full details.               Procedures excluding imaging which is summarized below:     Please see details in the electronic medical record.           Consultations:     PHARMACY IP CONSULT  CARDIOLOGY IP CONSULT  PHARMACY IP CONSULT  PHARMACY IP CONSULT  SMOKING CESSATION PROGRAM IP CONSULT          Significant Results:     Results for orders placed or performed during the hospital encounter of 01/31/25   CT Chest Pulmonary Embolism w Contrast    Narrative    EXAM: CT CHEST PULMONARY EMBOLISM WITH CONTRAST  LOCATION: [facility]  DATE: 01/31/2025    INDICATION: Chest discomfort with elevated d-dimer, concern for PE.  COMPARISON: Chest radiograph dated 05/03/2024.  TECHNIQUE: CT chest pulmonary angiogram during arterial phase injection of IV contrast. Multiplanar reformats and MIP reconstructions were performed. Dose reduction techniques were used.   CONTRAST: 61 mL Isovue 370.    FINDINGS:  ANGIOGRAM CHEST: Pulmonary arteries are normal caliber and negative for pulmonary emboli. Thoracic aorta is negative for dissection. No CT evidence of right heart strain.    LUNGS AND PLEURA: Mild atelectasis in both bases.    MEDIASTINUM/AXILLAE: No axillary or mediastinal adenopathy. No pericardial effusion.    CORONARY ARTERY CALCIFICATION: None.    UPPER ABDOMEN: Large hiatal hernia is noted.    MUSCULOSKELETAL: Degenerative changes are noted through the spine.      Impression    IMPRESSION:  1.  No evidence for pulmonary embolism.  2.  Mild bibasilar atelectasis.       CT Head w/o Contrast    Narrative    EXAM: CT HEAD W/O CONTRAST  LOCATION: [facility]  DATE: 2/1/2025    INDICATION: severe HA, eval for bleed; Headache; r o Subarachnoid hemorrhage; No sudden severe headache  COMPARISON:  MRI brain  2020.  TECHNIQUE: Routine CT Head without IV contrast. Multiplanar reformats. Dose reduction techniques were used.    FINDINGS:  INTRACRANIAL CONTENTS: No intracranial hemorrhage, extraaxial collection, or mass effect.  No CT evidence of acute infarct. Normal parenchymal attenuation. Mild generalized volume loss. No hydrocephalus.     VISUALIZED ORBITS/SINUSES/MASTOIDS: Prior bilateral cataract surgery. Visualized portions of the orbits are otherwise unremarkable. No paranasal sinus mucosal disease. No middle ear or mastoid effusion.    BONES/SOFT TISSUES: No acute abnormality.      Impression    IMPRESSION:  1.  No acute intracranial process.   Echocardiogram Complete     Value    LVEF  45-50%    [facility]    [id number]  [id number]  [id number]  [id number]^JHOAN^JARRED^JD     [facility]  Echocardiography Laboratory  [address] [place] [address]     Name: JOSE LUIS NASH  MRN: [id number]  : 1950  Study Date: 2025 10:28 AM  Age: 74 yrs  Gender: Female  Patient Location: [facility]  Reason For Study: Myocardial Infarction  Ordering Physician: JARRED STAPLES  Performed By: Nicolas Chandler RDCS     BSA: 1.6 m2  Height: 64 in  Weight: 128 lb  HR: 68  BP: 127/81 mmHg  ______________________________________________________________________________  Procedure  Echocardiogram with two-dimensional, color and spectral Doppler. Optison (NDC  #[id number]) given intravenously.  ______________________________________________________________________________  Interpretation Summary     Left ventricular systolic function is mildly reduced. The visual ejection  fraction is 45-50%. [place] is akinetic.  The right ventricle is normal in size and function.  There is mild (1+) aortic regurgitation.  The inferior vena cava was normal in size with preserved respiratory  variability.     This study was compared to a TTE from 2024. There has been no  significant  change.  ______________________________________________________________________________  Left Ventricle  The left ventricle is normal in size. There is normal left ventricular wall  thickness. Left ventricular systolic function is mildly reduced. The visual  ejection fraction is 45-50%. Grade II or moderate diastolic dysfunction. [place]  is akinetic.     Right Ventricle  The right ventricle is normal in size and function.     Atria  Normal left atrial size. Right atrial size is normal.     Mitral Valve  There is mild (1+) mitral regurgitation.     Tricuspid Valve  There is mild (1+) tricuspid regurgitation. The right ventricular systolic  pressure is approximated at 26.1 mmHg plus the right atrial pressure.     Aortic Valve  There is mild trileaflet aortic sclerosis. There is mild (1+) aortic  regurgitation.     Pulmonic Valve  The pulmonic valve is not well seen, but is grossly normal. There is trace  pulmonic valvular regurgitation.     Vessels  The aortic root is normal size. Normal size ascending aorta. The inferior vena  cava was normal in size with preserved respiratory variability.     Pericardium  There is no pericardial effusion.     ______________________________________________________________________________  MMode/2D Measurements & Calculations  IVSd: 0.75 cm  LVIDd: 5.0 cm  LVIDs: 3.1 cm  LVPWd: 0.72 cm  FS: 39.3 %     LV mass(C)d: 124.1 grams  LV mass(C)dI: 76.7 grams/m2  Ao root diam: 3.1 cm  LA dimension: 3.2 cm  asc Aorta Diam: 3.1 cm  LA/Ao: 1.0  Ao root diam index Ht(cm/m): 1.9  Ao root diam index BSA (cm/m2): 1.9  Asc Ao diam index BSA (cm/m2): 1.9  Asc Ao diam index Ht(cm/m): 1.9  EF Biplane: 46.9 %  LA Volume (BP): 50.6 ml     LA Volume Index (BP): 31.2 ml/m2  RV Base: 3.0 cm  RWT: 0.29  TAPSE: 1.7 cm     Doppler Measurements & Calculations  MV E max cachorro: 71.7 cm/sec  MV A max cachorro: 100.6 cm/sec  MV E/A: 0.71  MV dec slope: 240.1 cm/sec2  MV dec time: 0.30 sec  Ao V2 max: 153.2  cm/sec  Ao max P.2 mmHg  AI P1/2t: 599.8 msec  LV V1 max PG: 3.6 mmHg  LV V1 max: 95.2 cm/sec  TR max ricardo: 242.4 cm/sec  TR max P.1 mmHg  AV Ricardo Ratio (DI): 0.62  E/E' av.7  Lateral E/e': 22.8  Medial E/e': 18.7     RV S Ricardo: 9.5 cm/sec     ______________________________________________________________________________  Report approved by: Trent Davila MD on 2025 02:03 PM         Cardiac Catheterization    Narrative    Findings consistent with coronary vasospasm in the mid to distal LAD.    Very small caliber LAD responded significantly to administration of   intracoronary nitroglycerin.          Transthoracic Echocardiogram Results:  No results found for this or any previous visit (from the past 4320 hours).             Pending Results:     Unresulted Labs Ordered in the Past 30 Days of this Admission       No orders found for last 31 day(s).                        Discharge Instructions and Follow-Up:     Discharge instructions and follow-up:   Discharge Procedure Orders   Follow-Up with Cardiology CHERYL   Standing Status: Future   Referral Priority: Routine: Next available opening Referral Type: Consultation   Requested Specialty: Cardiovascular Disease   Number of Visits Requested: 1     Follow-Up with Cardiology   Standing Status: Future   Referral Priority: Routine: Next available opening Referral Type: Consultation   Requested Specialty: Cardiovascular Disease   Number of Visits Requested: 1     Brief Discharge Instructions   Order Comments: Do NOT stop your aspirin or platelet inhibitor unless directed by your Cardiologist.  These medications help to prevent platelets in your blood from sticking together and forming a clot.  Examples of these medications are:  Ticagrelor (Brilinta), Clopidigrel (Plavix), Prasugrel (Effient)     When to call - Contact the Heart Clinic   Order Comments: You may experience symptoms that require follow-up before your scheduled appointment. Contact the Heart Clinic  if you develop: Fever over 100.4o Fahrenheit, that lasts more than one day; Redness, heat, or pus at the puncture site; Change in color or temperature in your groin or leg.     When to call - Reasons to Call 911   Order Comments: If your groin starts to bleed or begins to swell suddenly after leaving the hospital, lie flat and apply firm pressure just above the puncture site for 15 minutes.  If bleeding continues, call 9-1-1.     Precautions - Lifting   Order Comments: NO lifting of more than 10 pounds for at least 3 days.  If you usually lift 50 pounds or more daily, talk with your Cardiologist.     Precautions - Household Activities   Order Comments: Avoid any hard work or tiring activities.  NO physical activity such as mowing the lawn, raking, vacuuming, changing sheets on your bed, snow shoveling, or using a .     Precautions - Active Sports Activities   Order Comments: Avoid any tiring sports activities.  This includes, yard work, jogging, biking, bowling, swimming, tennis or golf, and sexual activity.     Precautions - Elective Dental Work   Order Comments: NO elective dental work for 6 weeks after receiving a stent.     Comfort and Pain Management - Pain after Surgery   Order Comments: Pain after surgery is normal and expected.  Your leg may be sore or stiff for a few days, and your pain will improve with time. You may take Tylenol or a pain medicine recommended by your Cardiologist.     Comfort and Pain Management - Bruising after Surgery   Order Comments: Bruising around the groin area is normal.  It may take 2-3 weeks for this to go away.  It is normal for the bruised area to turn green and/or yellow as it is healing.  A small lump may also be present and may last 2-3 months.     Activity - Daily Walking   Order Comments: During the day get up and walk around every 2 hours.     Activity - Light Household Activities   Order Comments: Light household activities are ok.     Activity - Elevate Legs    Order Comments: Elevate legs in between all activities.     Activity - Cardiac Rehab   Order Comments: You are encouraged to enroll in an Outpatient Cardiac Rehab program after discharge from the hospital.  Our Cardiac Rehab staff may visit briefly with you while you're in the hospital.  If they miss you, someone will contact you after you are home.     Return to Driving   Order Comments: Driving is NOT permitted for 24 hours after surgery     Return to work   Order Comments: You may return to work after 72 hours if you are feeling well and your job does not involve heavy lifting.     Dressing Removal   Order Comments: You may take off the dressing on your groin the day after your procedure.     Incision Care   Order Comments: Keep the incision area dry and clean.  You do not need to use a bandage on your incision.     Shower / Bathing   Order Comments: It is ok to shower with regular soap. Pat dry, do not rub. No tub bath for 3 days. No swimming in a pool or hot tub immersion for 1 week     Reason for your hospital stay   Order Comments: Coronary vasospasm     Follow-up and recommended labs and tests    Order Comments: Follow up with primary care provider, Deepak Flores, within 7 days to evaluate medication change, for hospital follow- up, and regarding new diagnosis.  No follow up labs or test are needed.  Follow-up with cardiology as instructed.     Activity   Order Comments: Your activity upon discharge: activity as tolerated     Order Specific Question Answer Comments   Is discharge order? Yes      Full Code     Order Specific Question Answer Comments   Code status determined by: Discussion with patient/ legal decision maker      Diet   Order Comments: Follow this diet upon discharge: Regular Diet Adult     Order Specific Question Answer Comments   Is discharge order? Yes              Hospital Course:     Ania Castillo is a 74 year old female who presents with chest pain.  History was obtained from my  discussion with the patient at the bedside.  I also discussed the case with the ED provider.  The electronic medical record was also reviewed.     The patient is already following with cardiology.  She has a history of coronary disease and required LAD angioplasty without stenting in 2007.  She has had 2 other episodes of chest pain requiring coronary angiography that were largely unremarkable.  The thought is that she experiences healed dissection versus S CAD.     The patient woke up this morning and experienced immediate chest discomfort.  This is not exertional in nature.  It is vague but described as a tightness and dull discomfort.  She admits to some associated shortness of breath.  No diaphoresis or radiation into the neck or jaw or arm.  She came to the ED for evaluation.      She had a mild troponin elevation and was seen in consultation by cardiology.  She was placed on IV heparin and cardiology performed coronary angiography.  Findings were consistent with coronary vasospasm.  Her metoprolol was discontinued and she was placed on low-dose Imdur and amlodipine.  She has had no further chest pain nor shortness of breath.  She is ambulating without any further symptoms.    Unfortunately this morning her main complaint is that of a headache which has been fairly debilitating but is showing gradual improvement.  I suspect this is related to initiation of Imdur.  She has been using acetaminophen but I would like to stay away from medications such as NSAIDs and triptans.  Will give a dose of oxycodone and see if this helps.  Head CT is unremarkable and there is no evidence of intracranial bleeding.  She is otherwise cleared to discharge home.    The patient was seen, examined, and counseled on this day. The hospitalization and plan of care was reviewed with the patient extensively. All questions were addressed and the patient agreed to follow-up as noted above.      Total time spent in face to face contact with  the patient and coordinating discharge was:  35 Minutes    Prem Chin DO, MPH  [facility] Hospitalist  [address] [place] [address]  02/01/2025

## 2025-02-01 NOTE — DISCHARGE SUMMARY
AVS reviewed with pt. All questions answered. Pt denies any further questions or concerns. PIV removed, no complications. Telemetry monitor removed. All belongings returned. All filled prescriptions received by pt from pharmacy and signed for. Pt escorted off unit by [place] staff, home via dgtr.

## 2025-02-01 NOTE — PROGRESS NOTES
PRIMARY DIAGNOSIS: CHEST PAIN  OUTPATIENT/OBSERVATION GOALS TO BE MET BEFORE DISCHARGE:  1. Ruled out acute coronary syndrome (negative or stable Troponin):  Yes  2. Pain Status: Headache and neck pain. Denies chest pain. Improved-controlled with oral pain medications.  3. Appropriate provocative testing performed: Yes  - Stress Test Procedure: Angiogram  - Interpretation of cardiac rhythm per telemetry tech: SR    4. Cleared by Consultants (if applicable):No  5. Return to near baseline physical activity: No  Discharge Planner Nurse   Safe discharge environment identified: Yes  Barriers to discharge: Yes       Entered by: Cindy Koehler RN 02/01/2025 5:11 AM     Please review provider order for any additional goals.   Nurse to notify provider when observation goals have been met and patient is ready for discharge.

## 2025-02-01 NOTE — PROGRESS NOTES
PRIMARY DIAGNOSIS: CHEST PAIN  OUTPATIENT/OBSERVATION GOALS TO BE MET BEFORE DISCHARGE:  1. Ruled out acute coronary syndrome (negative or stable Troponin):  Yes  2. Pain Status: Pain free.  3. Appropriate provocative testing performed: Yes  - Stress Test Procedure: angio  - Interpretation of cardiac rhythm per telemetry tech: SR    4. Cleared by Consultants (if applicable):Yes  5. Return to near baseline physical activity: Yes  Discharge Planner Nurse   Safe discharge environment identified: Yes  Barriers to discharge: No       Entered by: Carolina Esparza RN 02/01/2025 11:27 AM     Please review provider order for any additional goals.   Nurse to notify provider when observation goals have been met and patient is ready for discharge.

## 2025-02-03 ENCOUNTER — TELEPHONE (OUTPATIENT)
Dept: CARDIOLOGY | Facility: CLINIC | Age: 75
End: 2025-02-03
Payer: MEDICARE

## 2025-02-03 NOTE — TELEPHONE ENCOUNTER
Writer received message from Dr. Mario's RN that Dr. Mario is our of office this week. Will route this note to Dr. Davila whom saw pt in the hospital. RASHAUN Bernal RN.

## 2025-02-03 NOTE — TELEPHONE ENCOUNTER
Pt called in DR Mario out of office until next week, and DR Davila who did consult also out until next week. Pt in hospital and had angiograms. Pt diagnosed with coronary spasms. Pt put on Amlodipine 2.5 mg daily, and imdur 30 mg. Pt said she was using aleve for HA, asked her to please not use aleve and go to tylenol. Informed Pt if tylenol not help she could try decreasing Imdur also to 15 mg a day, and see if she tolerates that. Will message provider DR Jenkins who did angiogram in hospital for further suggestions or recommendations. DESIREE Gaxiola RN

## 2025-02-03 NOTE — TELEPHONE ENCOUNTER
Patient was admitted to [facility] on 1/31/25 with chest pain-NSTEMI.    PMH: CAD with prior anterior/apical wall MI, type 2 diabetes, hypertension, hyperlipidemia, GERD.    1/31/25: Echo showed EF of 45-50%. [place] is akinetic. The right ventricle is normal in size and function. There is mild (1+) aortic regurgitation.    1/31/25: Coronary angiogram via RFA showed findings consistent with coronary vasospasm in the mid to distal LAD. Very small caliber LAD responded significantly to administration of intracoronary nitroglycerin.     Pt was started on Imdur and Norvasc. PTA Metoprolol was discontinued.    Called patient to discuss any post hospital d/c questions she may have, review medication changes, and confirm f/u appts. Patient denied any questions regarding new medications or changes to PTA medications.     Patient's main complaint was HA secondary to Imdur. States she sent a message to [facility]/Dr. Mario to address. Writer will also send a message to them.    Writer could not get any further questions out, before being disconnected from phone call. Writer called back twice more, pt answers but unable to hear caller. Will route a message to scheduling to call pt to schedule OV as ordered. RASHAUN Bernal RN.

## 2025-02-25 ENCOUNTER — TELEPHONE (OUTPATIENT)
Dept: CARDIOLOGY | Facility: CLINIC | Age: 75
End: 2025-02-25
Payer: MEDICARE

## 2025-02-25 NOTE — TELEPHONE ENCOUNTER
[facility]    Phone Message    May a detailed message be left on voicemail: yes     Reason for Call: Other: Per call from patient, she needs labs done prior to seeing Dr. Mario this Friday, same day and she doesn't know what labs. Please review and reach out to her to schedule lab if needed. No orders in the chart.Thanks     Action Taken: Other: Cardio    Travel Screening: Not Applicable     Date of Service:

## 2025-02-25 NOTE — TELEPHONE ENCOUNTER
Returned call to Pt discussed no labs. Discussed no labs ordered, this is a hospital follow up. DESIREE Gaxiola RN

## 2025-04-08 ENCOUNTER — TELEPHONE (OUTPATIENT)
Dept: CARDIOLOGY | Facility: CLINIC | Age: 75
End: 2025-04-08
Payer: MEDICARE

## 2025-04-08 DIAGNOSIS — I21.4 NSTEMI (NON-ST ELEVATED MYOCARDIAL INFARCTION) (H): ICD-10-CM

## 2025-04-08 RX ORDER — ISOSORBIDE MONONITRATE 30 MG/1
30 TABLET, EXTENDED RELEASE ORAL DAILY
Qty: 90 TABLET | Refills: 2 | Status: SHIPPED | OUTPATIENT
Start: 2025-04-08

## 2025-04-08 NOTE — TELEPHONE ENCOUNTER
[facility] Call [facility]    Phone Message    May a detailed message be left on voicemail: yes     Reason for Call: Medication Refill Request    Has the patient contacted the pharmacy for the refill? Yes   Name of medication being requested: isosorbide mononitrate (IMDUR) 30 MG 24 hr tablet     Provider who prescribed the medication:     Pharmacy: [facility] [address] [place] [id number] [facility]    Date medication is needed: 4/8/2025       Action Taken: Other: Cardio     Travel Screening: Not Applicable     Date of Service:             Thank you!  [facility]

## 2025-04-08 NOTE — TELEPHONE ENCOUNTER
[facility] Refill Guideline reviewed.  Medication meets criteria for refill. DESIREE Gaxiola RN

## 2025-05-28 DIAGNOSIS — I21.4 NSTEMI (NON-ST ELEVATED MYOCARDIAL INFARCTION) (H): ICD-10-CM

## 2025-05-28 RX ORDER — AMLODIPINE BESYLATE 2.5 MG/1
2.5 TABLET ORAL EVERY EVENING
Qty: 90 TABLET | Refills: 2 | Status: SHIPPED | OUTPATIENT
Start: 2025-05-28

## (undated) DEVICE — SU DERMABOND PRINEO 22CM CLR222US

## (undated) DEVICE — INTRO SHEATH 4FRX25CM PINNACLE MARKER RSB403

## (undated) DEVICE — GUIDEWIRE VASC 0.035INX150CM INQWIRE J TIP IQ35F150J3F/A

## (undated) DEVICE — HOOD FLYTE W/PEELAWAY 408-800-100

## (undated) DEVICE — DRAPE STERI TOWEL LG 1010

## (undated) DEVICE — BLADE SAW SAGITTAL STRK 18X90X1.19MM HD SYS 6 6118-119-090

## (undated) DEVICE — SOL WATER IRRIG 1000ML BOTTLE 2F7114

## (undated) DEVICE — GLOVE BIOGEL PI ULTRATOUCH SZ 8.0 41180

## (undated) DEVICE — DRAPE IOBAN INCISE 36X23" 6651EZ

## (undated) DEVICE — MANIFOLD NEPTUNE 4 PORT 700-20

## (undated) DEVICE — CATH ANGIO INFINITI 3DRC 4FRX100CM 538476

## (undated) DEVICE — PREP SKIN SCRUB TRAY 4461A

## (undated) DEVICE — SU STRATAFIX PDS PLUS 2 CTX SPIRAL L14 IN SXPP2B405

## (undated) DEVICE — DEFIB PRO-PADZ LVP LQD GEL ADULT 8900-2105-01

## (undated) DEVICE — LINEN TOWEL PACK X5 5464

## (undated) DEVICE — KIT HAND CONTROL ANGIOTOUCH ACIST 65CM AT-P65

## (undated) DEVICE — ESU GROUND PAD UNIVERSAL W/O CORD

## (undated) DEVICE — SU VICRYL 0 CTX 36" J370H

## (undated) DEVICE — RAD INTRODUCER KIT MICRO 5FRX10CM .018 NITINOL G/W

## (undated) DEVICE — SOL NACL 0.9% IRRIG 1000ML BOTTLE 2F7124

## (undated) DEVICE — DEVICE RETRIEVER HEWSON 71111579

## (undated) DEVICE — GLOVE BIOGEL PI SZ 8.0 40880

## (undated) DEVICE — CATH DIAG 4FR JL 4.5 538417

## (undated) DEVICE — Device

## (undated) DEVICE — DECANTER BAG 2002S

## (undated) DEVICE — SYR 10ML FINGER CONTROL W/O NDL 309695

## (undated) DEVICE — PACK TOTAL HIP W/U DRAPE SOP15HUFSC

## (undated) DEVICE — SU ETHIBOND 0 CTX CR  8X18" CX31D

## (undated) DEVICE — SU VICRYL 2-0 CP-1 27" UND J266H

## (undated) DEVICE — SU ETHIBOND 2 V-37 4X30" MX69G

## (undated) DEVICE — SU FIBERWIRE 5 CCS-1 BLUE  AR-7211

## (undated) DEVICE — NDL 22GA 1.5"

## (undated) DEVICE — MANIFOLD KIT ANGIO AUTOMATED 014613

## (undated) DEVICE — DRAPE STERI U 1015

## (undated) RX ORDER — FENTANYL CITRATE 0.05 MG/ML
INJECTION, SOLUTION INTRAMUSCULAR; INTRAVENOUS
Status: DISPENSED
Start: 2024-05-01

## (undated) RX ORDER — FENTANYL CITRATE 50 UG/ML
INJECTION, SOLUTION INTRAMUSCULAR; INTRAVENOUS
Status: DISPENSED
Start: 2025-01-31

## (undated) RX ORDER — VANCOMYCIN HYDROCHLORIDE 1 G/20ML
INJECTION, POWDER, LYOPHILIZED, FOR SOLUTION INTRAVENOUS
Status: DISPENSED
Start: 2024-05-01

## (undated) RX ORDER — PROPOFOL 10 MG/ML
INJECTION, EMULSION INTRAVENOUS
Status: DISPENSED
Start: 2024-05-01

## (undated) RX ORDER — ONDANSETRON 2 MG/ML
INJECTION INTRAMUSCULAR; INTRAVENOUS
Status: DISPENSED
Start: 2024-05-01

## (undated) RX ORDER — LIDOCAINE HYDROCHLORIDE 10 MG/ML
INJECTION, SOLUTION EPIDURAL; INFILTRATION; INTRACAUDAL; PERINEURAL
Status: DISPENSED
Start: 2025-01-31

## (undated) RX ORDER — HEPARIN SODIUM 1000 [USP'U]/ML
INJECTION, SOLUTION INTRAVENOUS; SUBCUTANEOUS
Status: DISPENSED
Start: 2025-01-31

## (undated) RX ORDER — DEXAMETHASONE SODIUM PHOSPHATE 4 MG/ML
INJECTION, SOLUTION INTRA-ARTICULAR; INTRALESIONAL; INTRAMUSCULAR; INTRAVENOUS; SOFT TISSUE
Status: DISPENSED
Start: 2024-05-01

## (undated) RX ORDER — NITROGLYCERIN 5 MG/ML
VIAL (ML) INTRAVENOUS
Status: DISPENSED
Start: 2025-01-31

## (undated) RX ORDER — DIAZEPAM 5 MG
TABLET ORAL
Status: DISPENSED
Start: 2017-09-15